# Patient Record
Sex: FEMALE | Race: BLACK OR AFRICAN AMERICAN | NOT HISPANIC OR LATINO | Employment: UNEMPLOYED | URBAN - METROPOLITAN AREA
[De-identification: names, ages, dates, MRNs, and addresses within clinical notes are randomized per-mention and may not be internally consistent; named-entity substitution may affect disease eponyms.]

---

## 2017-01-24 ENCOUNTER — OFFICE VISIT (OUTPATIENT)
Dept: ORTHOPEDICS | Age: 67
End: 2017-01-24
Attending: INTERNAL MEDICINE

## 2017-01-24 ENCOUNTER — IMAGING SERVICES (OUTPATIENT)
Dept: GENERAL RADIOLOGY | Age: 67
End: 2017-01-24
Attending: ORTHOPAEDIC SURGERY

## 2017-01-24 VITALS
BODY MASS INDEX: 38.41 KG/M2 | WEIGHT: 225 LBS | DIASTOLIC BLOOD PRESSURE: 53 MMHG | SYSTOLIC BLOOD PRESSURE: 122 MMHG | TEMPERATURE: 97.9 F | HEART RATE: 68 BPM | HEIGHT: 64 IN

## 2017-01-24 DIAGNOSIS — M25.561 ACUTE PAIN OF RIGHT KNEE: ICD-10-CM

## 2017-01-24 DIAGNOSIS — M17.0 PRIMARY OSTEOARTHRITIS OF BOTH KNEES: Primary | ICD-10-CM

## 2017-01-24 PROCEDURE — 73560 X-RAY EXAM OF KNEE 1 OR 2: CPT | Performed by: RADIOLOGY

## 2017-01-24 PROCEDURE — 99244 OFF/OP CNSLTJ NEW/EST MOD 40: CPT | Performed by: ORTHOPAEDIC SURGERY

## 2017-02-02 DIAGNOSIS — Z87.19 H/O ESOPHAGITIS: ICD-10-CM

## 2017-02-02 RX ORDER — PANTOPRAZOLE SODIUM 40 MG/1
TABLET, DELAYED RELEASE ORAL
Qty: 90 TABLET | Refills: 0 | Status: SHIPPED | OUTPATIENT
Start: 2017-02-02 | End: 2017-09-06 | Stop reason: SDUPTHER

## 2017-05-07 DIAGNOSIS — Z87.19 H/O ESOPHAGITIS: ICD-10-CM

## 2017-05-08 RX ORDER — PANTOPRAZOLE SODIUM 40 MG/1
TABLET, DELAYED RELEASE ORAL
Qty: 90 TABLET | Refills: 0 | OUTPATIENT
Start: 2017-05-08

## 2017-06-28 ENCOUNTER — LAB SERVICES (OUTPATIENT)
Dept: LAB | Age: 67
End: 2017-06-28

## 2017-06-28 ENCOUNTER — OFFICE VISIT (OUTPATIENT)
Dept: INTERNAL MEDICINE | Age: 67
End: 2017-06-28

## 2017-06-28 VITALS
WEIGHT: 228 LBS | RESPIRATION RATE: 16 BRPM | HEART RATE: 69 BPM | OXYGEN SATURATION: 97 % | TEMPERATURE: 98 F | DIASTOLIC BLOOD PRESSURE: 70 MMHG | HEIGHT: 64 IN | SYSTOLIC BLOOD PRESSURE: 114 MMHG | BODY MASS INDEX: 38.93 KG/M2

## 2017-06-28 DIAGNOSIS — Z13.228 SCREENING FOR ENDOCRINE/METABOLIC/IMMUNITY DISORDERS: ICD-10-CM

## 2017-06-28 DIAGNOSIS — Z13.29 SCREENING FOR ENDOCRINE/METABOLIC/IMMUNITY DISORDERS: ICD-10-CM

## 2017-06-28 DIAGNOSIS — Z13.0 SCREENING FOR ENDOCRINE/METABOLIC/IMMUNITY DISORDERS: ICD-10-CM

## 2017-06-28 DIAGNOSIS — H54.3 DECREASED VISION IN BOTH EYES: ICD-10-CM

## 2017-06-28 DIAGNOSIS — I95.2 HYPOTENSION DUE TO DRUGS: Primary | ICD-10-CM

## 2017-06-28 DIAGNOSIS — I95.2 HYPOTENSION DUE TO DRUGS: ICD-10-CM

## 2017-06-28 LAB
ALBUMIN SERPL-MCNC: 3.8 G/DL (ref 3.6–5.1)
ALBUMIN/GLOB SERPL: 1.1 {RATIO} (ref 1–2.4)
ALP SERPL-CCNC: 90 UNITS/L (ref 45–117)
ALT SERPL-CCNC: 19 UNITS/L
ANION GAP SERPL CALC-SCNC: 13 MMOL/L (ref 10–20)
AST SERPL-CCNC: 18 UNITS/L
BASOPHILS # BLD AUTO: 0 K/MCL (ref 0–0.3)
BASOPHILS NFR BLD AUTO: 0 %
BILIRUB SERPL-MCNC: 0.4 MG/DL (ref 0.2–1)
BUN SERPL-MCNC: 15 MG/DL (ref 6–20)
BUN/CREAT SERPL: 19 (ref 7–25)
CALCIUM SERPL-MCNC: 9.7 MG/DL (ref 8.4–10.2)
CHLORIDE SERPL-SCNC: 104 MMOL/L (ref 98–107)
CO2 SERPL-SCNC: 25 MMOL/L (ref 21–32)
CREAT SERPL-MCNC: 0.8 MG/DL (ref 0.51–0.95)
DIFFERENTIAL METHOD BLD: NORMAL
EOSINOPHIL # BLD AUTO: 0.3 K/MCL (ref 0.1–0.5)
EOSINOPHIL NFR SPEC: 3 %
ERYTHROCYTE [DISTWIDTH] IN BLOOD: 14 % (ref 11–15)
FASTING STATUS PATIENT QL REPORTED: NORMAL HRS
GLOBULIN SER-MCNC: 3.6 G/DL (ref 2–4)
GLUCOSE SERPL-MCNC: 88 MG/DL (ref 65–99)
GLUCOSE, EST AVERAGE: ABNORMAL
HBA1C MFR BLD: 6.1 %
HBA1C MFR BLD: 6.1 % (ref 4.5–5.6)
HCT VFR BLD CALC: 39.2 % (ref 36–46.5)
HGB BLD-MCNC: 12.8 G/DL (ref 12–15.5)
LYMPHOCYTES # BLD MANUAL: 2.2 K/MCL (ref 1–4)
LYMPHOCYTES NFR BLD MANUAL: 24 %
MCH RBC QN AUTO: 28.8 PG (ref 26–34)
MCHC RBC AUTO-ENTMCNC: 32.7 G/DL (ref 32–36.5)
MCV RBC AUTO: 88.1 FL (ref 78–100)
MONOCYTES # BLD MANUAL: 0.3 K/MCL (ref 0.3–0.9)
MONOCYTES NFR BLD MANUAL: 4 %
NEUTROPHILS # BLD AUTO: 6.4 K/MCL (ref 1.8–7.7)
NEUTROPHILS NFR BLD AUTO: 69 %
PLATELET # BLD: 286 K/MCL (ref 140–450)
POTASSIUM SERPL-SCNC: 4.4 MMOL/L (ref 3.4–5.1)
PROT SERPL-MCNC: 7.4 G/DL (ref 6.4–8.2)
RBC # BLD: 4.45 MIL/MCL (ref 4–5.2)
SODIUM SERPL-SCNC: 138 MMOL/L (ref 135–145)
WBC # BLD: 9.2 K/MCL (ref 4.2–11)

## 2017-06-28 PROCEDURE — 99213 OFFICE O/P EST LOW 20 MIN: CPT | Performed by: NURSE PRACTITIONER

## 2017-06-28 PROCEDURE — 36415 COLL VENOUS BLD VENIPUNCTURE: CPT | Performed by: NURSE PRACTITIONER

## 2017-06-28 PROCEDURE — 83036 HEMOGLOBIN GLYCOSYLATED A1C: CPT | Performed by: NURSE PRACTITIONER

## 2017-06-28 RX ORDER — AMLODIPINE BESYLATE 5 MG/1
5 TABLET ORAL DAILY
Qty: 30 TABLET | Refills: 1 | Status: SHIPPED | OUTPATIENT
Start: 2017-06-28 | End: 2017-06-28 | Stop reason: SDUPTHER

## 2017-06-28 RX ORDER — AMLODIPINE BESYLATE 5 MG/1
5 TABLET ORAL DAILY
Qty: 90 TABLET | Refills: 1 | Status: SHIPPED | OUTPATIENT
Start: 2017-06-28 | End: 2017-09-06 | Stop reason: SDUPTHER

## 2017-06-29 ENCOUNTER — TELEPHONE (OUTPATIENT)
Dept: INTERNAL MEDICINE | Age: 67
End: 2017-06-29

## 2017-07-06 ENCOUNTER — OFFICE VISIT (OUTPATIENT)
Dept: OPHTHALMOLOGY | Age: 67
End: 2017-07-06
Attending: NURSE PRACTITIONER

## 2017-07-06 DIAGNOSIS — H52.4 ASTIGMATISM WITH PRESBYOPIA, BILATERAL: ICD-10-CM

## 2017-07-06 DIAGNOSIS — H25.813 COMBINED FORMS OF AGE-RELATED CATARACT OF BOTH EYES: Primary | ICD-10-CM

## 2017-07-06 DIAGNOSIS — H52.203 ASTIGMATISM WITH PRESBYOPIA, BILATERAL: ICD-10-CM

## 2017-07-06 DIAGNOSIS — H53.9 VISION DISTURBANCE: ICD-10-CM

## 2017-07-06 PROCEDURE — 92015 DETERMINE REFRACTIVE STATE: CPT | Performed by: OPTOMETRIST

## 2017-07-06 PROCEDURE — 92014 COMPRE OPH EXAM EST PT 1/>: CPT | Performed by: OPTOMETRIST

## 2017-07-06 ASSESSMENT — REFRACTION_WEARINGRX
OD_SPHERE: +1.25
OD_ADD: +2.00
OD_CYLINDER: +1.25
OS_ADD: +2.00
OD_AXIS: 173
OS_SPHERE: +1.25
OS_AXIS: 176
OS_CYLINDER: +1.00

## 2017-07-06 ASSESSMENT — CUP TO DISC RATIO
OS_RATIO: 0.35
OD_RATIO: 0.35

## 2017-07-06 ASSESSMENT — VISUAL ACUITY
OS_CC: 20/40+1
OD_CC: 20/25+2
OS_PH_CC: 20/30-2
METHOD: SNELLEN - LINEAR

## 2017-07-06 ASSESSMENT — SLIT LAMP EXAM - LIDS
COMMENTS: NORMAL
COMMENTS: NORMAL

## 2017-07-06 ASSESSMENT — EXTERNAL EXAM - RIGHT EYE: OD_EXAM: NORMAL

## 2017-07-06 ASSESSMENT — REFRACTION_MANIFEST
OS_AXIS: 180
OD_SPHERE: +1.00
OS_CYLINDER: +0.75
OD_ADD: +2.25
OS_SPHERE: +1.00
OS_ADD: +2.25
OD_AXIS: 180
OD_CYLINDER: +0.75

## 2017-07-06 ASSESSMENT — REFRACTION
OD_AXIS: 177
OS_AXIS: 017
OD_ADD: +2.25
OD_CYLINDER: +1.25
OD_SPHERE: +0.75
OS_CYLINDER: +0.50
OS_SPHERE: +1.00
OS_ADD: +2.25

## 2017-07-06 ASSESSMENT — TONOMETRY
OS_IOP_MMHG: 18
IOP_METHOD: APPLANATION
OD_IOP_MMHG: 20

## 2017-07-06 ASSESSMENT — CONF VISUAL FIELD
OS_NORMAL: 1
OD_NORMAL: 1

## 2017-07-06 ASSESSMENT — GONIOSCOPY
OS_SUPERIOR: OPEN
OD_SUPERIOR: OPEN

## 2017-07-06 ASSESSMENT — EXTERNAL EXAM - LEFT EYE: OS_EXAM: NORMAL

## 2017-08-04 DIAGNOSIS — Z87.19 H/O ESOPHAGITIS: ICD-10-CM

## 2017-08-04 DIAGNOSIS — I95.2 HYPOTENSION DUE TO DRUGS: ICD-10-CM

## 2017-08-04 RX ORDER — AMLODIPINE BESYLATE 5 MG/1
5 TABLET ORAL DAILY
Qty: 90 TABLET | Refills: 1 | Status: SHIPPED | OUTPATIENT
Start: 2017-08-04 | End: 2017-09-06 | Stop reason: SDUPTHER

## 2017-08-04 RX ORDER — PANTOPRAZOLE SODIUM 40 MG/1
40 TABLET, DELAYED RELEASE ORAL DAILY
Qty: 90 TABLET | Refills: 1 | Status: SHIPPED | OUTPATIENT
Start: 2017-08-04 | End: 2017-09-06 | Stop reason: SDUPTHER

## 2017-09-06 ENCOUNTER — OFFICE VISIT (OUTPATIENT)
Dept: INTERNAL MEDICINE | Age: 67
End: 2017-09-06

## 2017-09-06 ENCOUNTER — HOSPITAL ENCOUNTER (OUTPATIENT)
Dept: MAMMOGRAPHY | Age: 67
Discharge: HOME | End: 2017-09-06
Attending: INTERNAL MEDICINE

## 2017-09-06 VITALS
HEART RATE: 72 BPM | SYSTOLIC BLOOD PRESSURE: 159 MMHG | OXYGEN SATURATION: 98 % | WEIGHT: 228 LBS | DIASTOLIC BLOOD PRESSURE: 69 MMHG | HEIGHT: 64 IN | TEMPERATURE: 96.8 F | BODY MASS INDEX: 38.93 KG/M2

## 2017-09-06 DIAGNOSIS — Z12.31 VISIT FOR SCREENING MAMMOGRAM: ICD-10-CM

## 2017-09-06 DIAGNOSIS — M17.0 OSTEOARTHRITIS OF BOTH KNEES, UNSPECIFIED OSTEOARTHRITIS TYPE: ICD-10-CM

## 2017-09-06 DIAGNOSIS — R73.09 ELEVATED HEMOGLOBIN A1C: ICD-10-CM

## 2017-09-06 DIAGNOSIS — K57.90 DIVERTICULOSIS OF INTESTINE WITHOUT BLEEDING, UNSPECIFIED INTESTINAL TRACT LOCATION: ICD-10-CM

## 2017-09-06 DIAGNOSIS — K22.2 GERD WITH STRICTURE: ICD-10-CM

## 2017-09-06 DIAGNOSIS — E66.01 MORBID OBESITY DUE TO EXCESS CALORIES (CMD): ICD-10-CM

## 2017-09-06 DIAGNOSIS — K44.9 HIATAL HERNIA: ICD-10-CM

## 2017-09-06 DIAGNOSIS — K21.9 GERD WITH STRICTURE: ICD-10-CM

## 2017-09-06 DIAGNOSIS — I10 ESSENTIAL HYPERTENSION: Primary | ICD-10-CM

## 2017-09-06 DIAGNOSIS — R60.0 BILATERAL LEG EDEMA: ICD-10-CM

## 2017-09-06 DIAGNOSIS — K22.10 EROSIVE ESOPHAGITIS: ICD-10-CM

## 2017-09-06 PROCEDURE — G0202 SCR MAMMO BI INCL CAD: HCPCS

## 2017-09-06 PROCEDURE — G0202 SCR MAMMO BI INCL CAD: HCPCS | Performed by: RADIOLOGY

## 2017-09-06 PROCEDURE — 99214 OFFICE O/P EST MOD 30 MIN: CPT | Performed by: INTERNAL MEDICINE

## 2017-09-06 PROCEDURE — 3342F MAMMO ASSESS BENGN DOCD: CPT | Performed by: RADIOLOGY

## 2017-09-06 RX ORDER — PANTOPRAZOLE SODIUM 40 MG/1
40 TABLET, DELAYED RELEASE ORAL DAILY
Qty: 90 TABLET | Refills: 0 | Status: SHIPPED | OUTPATIENT
Start: 2017-09-06 | End: 2017-12-13 | Stop reason: SDUPTHER

## 2017-09-06 RX ORDER — AMLODIPINE BESYLATE 5 MG/1
5 TABLET ORAL DAILY
Qty: 90 TABLET | Refills: 1 | Status: SHIPPED | OUTPATIENT
Start: 2017-09-06 | End: 2018-05-16 | Stop reason: ALTCHOICE

## 2017-09-06 ASSESSMENT — ENCOUNTER SYMPTOMS
SHORTNESS OF BREATH: 0
HEARTBURN: 1
BLURRED VISION: 1
HEADACHES: 1

## 2017-09-08 ENCOUNTER — TELEPHONE (OUTPATIENT)
Dept: INTERNAL MEDICINE | Age: 67
End: 2017-09-08

## 2017-10-04 ENCOUNTER — OFFICE VISIT (OUTPATIENT)
Dept: INTERNAL MEDICINE | Age: 67
End: 2017-10-04

## 2017-10-04 VITALS
BODY MASS INDEX: 38.76 KG/M2 | WEIGHT: 227 LBS | DIASTOLIC BLOOD PRESSURE: 70 MMHG | TEMPERATURE: 96.6 F | SYSTOLIC BLOOD PRESSURE: 131 MMHG | HEART RATE: 66 BPM | HEIGHT: 64 IN

## 2017-10-04 DIAGNOSIS — I10 ESSENTIAL HYPERTENSION: Primary | ICD-10-CM

## 2017-10-04 DIAGNOSIS — I89.0 LYMPHEDEMA OF BOTH LOWER EXTREMITIES: ICD-10-CM

## 2017-10-04 DIAGNOSIS — Z23 NEED FOR VACCINATION: ICD-10-CM

## 2017-10-04 DIAGNOSIS — Z23 NEEDS FLU SHOT: ICD-10-CM

## 2017-10-04 DIAGNOSIS — N18.2 CHRONIC KIDNEY DISEASE (CKD), STAGE II (MILD): ICD-10-CM

## 2017-10-04 PROCEDURE — 99213 OFFICE O/P EST LOW 20 MIN: CPT | Performed by: INTERNAL MEDICINE

## 2017-10-04 PROCEDURE — 90686 IIV4 VACC NO PRSV 0.5 ML IM: CPT | Performed by: INTERNAL MEDICINE

## 2017-10-04 PROCEDURE — G0008 ADMIN INFLUENZA VIRUS VAC: HCPCS | Performed by: INTERNAL MEDICINE

## 2017-10-04 ASSESSMENT — ENCOUNTER SYMPTOMS
SLEEP DISTURBANCE: 0
CONSTIPATION: 0
SHORTNESS OF BREATH: 0

## 2017-10-09 ENCOUNTER — APPOINTMENT (OUTPATIENT)
Dept: REHABILITATION | Age: 67
End: 2017-10-09
Attending: INTERNAL MEDICINE

## 2017-10-09 ENCOUNTER — OFFICE VISIT (OUTPATIENT)
Dept: GASTROENTEROLOGY | Age: 67
End: 2017-10-09

## 2017-10-09 VITALS
BODY MASS INDEX: 38.93 KG/M2 | SYSTOLIC BLOOD PRESSURE: 132 MMHG | HEIGHT: 64 IN | HEART RATE: 83 BPM | OXYGEN SATURATION: 99 % | TEMPERATURE: 97.5 F | WEIGHT: 228 LBS | DIASTOLIC BLOOD PRESSURE: 79 MMHG

## 2017-10-09 DIAGNOSIS — R13.10 DYSPHAGIA, UNSPECIFIED TYPE: Primary | ICD-10-CM

## 2017-10-09 PROCEDURE — 99203 OFFICE O/P NEW LOW 30 MIN: CPT | Performed by: INTERNAL MEDICINE

## 2017-10-16 ENCOUNTER — HOSPITAL ENCOUNTER (OUTPATIENT)
Dept: REHABILITATION | Age: 67
Discharge: STILL ADMITTED OR ADMINISTRATIVE | End: 2017-10-16
Attending: INTERNAL MEDICINE

## 2017-10-16 DIAGNOSIS — I89.0 LYMPHEDEMA OF BOTH LOWER EXTREMITIES: ICD-10-CM

## 2017-10-16 PROCEDURE — G8985 CARRY GOAL STATUS: HCPCS | Performed by: OCCUPATIONAL THERAPIST

## 2017-10-16 PROCEDURE — 10004138 HB COUNTER EVAL LYMPHEDEMA: Performed by: OCCUPATIONAL THERAPIST

## 2017-10-16 PROCEDURE — G8990 OTHER PT/OT CURRENT STATUS: HCPCS | Performed by: OCCUPATIONAL THERAPIST

## 2017-10-16 PROCEDURE — 97530 THERAPEUTIC ACTIVITIES: CPT | Performed by: OCCUPATIONAL THERAPIST

## 2017-10-16 PROCEDURE — 10004172 HB COUNTER-THERAPY VISIT OT: Performed by: OCCUPATIONAL THERAPIST

## 2017-10-16 PROCEDURE — 97165 OT EVAL LOW COMPLEX 30 MIN: CPT | Performed by: OCCUPATIONAL THERAPIST

## 2017-10-23 ENCOUNTER — PREP FOR CASE (OUTPATIENT)
Dept: GASTROENTEROLOGY | Age: 67
End: 2017-10-23

## 2017-10-23 DIAGNOSIS — R13.10 DYSPHAGIA, UNSPECIFIED TYPE: Primary | ICD-10-CM

## 2017-10-24 ENCOUNTER — HOSPITAL ENCOUNTER (OUTPATIENT)
Dept: REHABILITATION | Age: 67
Discharge: STILL ADMITTED OR ADMINISTRATIVE | End: 2017-10-24
Attending: INTERNAL MEDICINE

## 2017-10-24 PROCEDURE — 10004172 HB COUNTER-THERAPY VISIT OT: Performed by: OCCUPATIONAL THERAPY ASSISTANT

## 2017-10-24 PROCEDURE — 97140 MANUAL THERAPY 1/> REGIONS: CPT | Performed by: OCCUPATIONAL THERAPY ASSISTANT

## 2017-10-24 PROCEDURE — 97530 THERAPEUTIC ACTIVITIES: CPT | Performed by: OCCUPATIONAL THERAPY ASSISTANT

## 2017-10-30 ENCOUNTER — APPOINTMENT (OUTPATIENT)
Dept: REHABILITATION | Age: 67
End: 2017-10-30
Attending: INTERNAL MEDICINE

## 2017-11-01 RX ORDER — SODIUM CHLORIDE 9 MG/ML
INJECTION, SOLUTION INTRAVENOUS CONTINUOUS
Status: CANCELLED | OUTPATIENT
Start: 2017-11-01

## 2017-11-01 RX ORDER — 0.9 % SODIUM CHLORIDE 0.9 %
2 VIAL (ML) INJECTION PRN
Status: CANCELLED | OUTPATIENT
Start: 2017-11-01

## 2017-11-01 RX ORDER — 0.9 % SODIUM CHLORIDE 0.9 %
2 VIAL (ML) INJECTION EVERY 12 HOURS SCHEDULED
Status: CANCELLED | OUTPATIENT
Start: 2017-11-01

## 2017-11-01 ASSESSMENT — ACTIVITIES OF DAILY LIVING (ADL)
ADL_SHORT_OF_BREATH: NO
ADL_SCORE: 12
ADL_BEFORE_ADMISSION: INDEPENDENT
HISTORY OF FALLING IN THE LAST YEAR (PRIOR TO ADMISSION): NO
NEEDS_ASSIST: NO
CHRONIC_PAIN_PRESENT: NO
RECENT_DECLINE_ADL: NO
SENSORY_SUPPORT_DEVICES: EYEGLASSES

## 2017-11-01 ASSESSMENT — COGNITIVE AND FUNCTIONAL STATUS - GENERAL
ARE YOU BLIND OR DO YOU HAVE SERIOUS DIFFICULTY SEEING, EVEN WHEN WEARING GLASSES: NO
ARE YOU DEAF OR DO YOU HAVE SERIOUS DIFFICULTY  HEARING: NO

## 2017-11-02 ENCOUNTER — OFF PREMISE (OUTPATIENT)
Dept: GASTROENTEROLOGY | Age: 67
End: 2017-11-02

## 2017-11-02 ENCOUNTER — HOSPITAL ENCOUNTER (OUTPATIENT)
Age: 67
Discharge: HOME | End: 2017-11-02
Attending: INTERNAL MEDICINE | Admitting: INTERNAL MEDICINE

## 2017-11-02 ENCOUNTER — SURGERY (OUTPATIENT)
Age: 67
End: 2017-11-02

## 2017-11-02 VITALS
BODY MASS INDEX: 38.58 KG/M2 | SYSTOLIC BLOOD PRESSURE: 124 MMHG | OXYGEN SATURATION: 97 % | DIASTOLIC BLOOD PRESSURE: 64 MMHG | RESPIRATION RATE: 12 BRPM | WEIGHT: 225.97 LBS | HEART RATE: 74 BPM | HEIGHT: 64 IN | TEMPERATURE: 98.2 F

## 2017-11-02 DIAGNOSIS — K22.2 BENIGN ESOPHAGEAL STRICTURE: ICD-10-CM

## 2017-11-02 DIAGNOSIS — R13.10 DYSPHAGIA, UNSPECIFIED TYPE: ICD-10-CM

## 2017-11-02 PROCEDURE — 10004185 HB COUNTER-VISIT  CENSUS

## 2017-11-02 PROCEDURE — C1726 CATH, BAL DIL, NON-VASCULAR: HCPCS | Performed by: INTERNAL MEDICINE

## 2017-11-02 PROCEDURE — G0500 MOD SEDAT ENDO SERVICE >5YRS: HCPCS | Performed by: INTERNAL MEDICINE

## 2017-11-02 PROCEDURE — 43249 ESOPH EGD DILATION <30 MM: CPT | Performed by: INTERNAL MEDICINE

## 2017-11-02 PROCEDURE — 10002807 HB RX 258: Performed by: INTERNAL MEDICINE

## 2017-11-02 PROCEDURE — 10002800 HB RX 250 W HCPCS: Performed by: INTERNAL MEDICINE

## 2017-11-02 PROCEDURE — 10003436 HB UPPER GI ENDOSCOPY: Performed by: INTERNAL MEDICINE

## 2017-11-02 RX ORDER — SODIUM CHLORIDE 9 MG/ML
INJECTION, SOLUTION INTRAVENOUS CONTINUOUS
Status: DISCONTINUED | OUTPATIENT
Start: 2017-11-02 | End: 2017-11-02 | Stop reason: HOSPADM

## 2017-11-02 RX ORDER — 0.9 % SODIUM CHLORIDE 0.9 %
2 VIAL (ML) INJECTION PRN
Status: DISCONTINUED | OUTPATIENT
Start: 2017-11-02 | End: 2017-11-02 | Stop reason: HOSPADM

## 2017-11-02 RX ORDER — 0.9 % SODIUM CHLORIDE 0.9 %
2 VIAL (ML) INJECTION EVERY 12 HOURS SCHEDULED
Status: DISCONTINUED | OUTPATIENT
Start: 2017-11-02 | End: 2017-11-02 | Stop reason: HOSPADM

## 2017-11-02 RX ORDER — MIDAZOLAM HYDROCHLORIDE 1 MG/ML
INJECTION, SOLUTION INTRAMUSCULAR; INTRAVENOUS PRN
Status: DISCONTINUED | OUTPATIENT
Start: 2017-11-02 | End: 2017-11-02 | Stop reason: HOSPADM

## 2017-11-02 RX ADMIN — SODIUM CHLORIDE: 9 INJECTION, SOLUTION INTRAVENOUS at 12:08

## 2017-11-02 RX ADMIN — MIDAZOLAM HYDROCHLORIDE 2 MG: 1 INJECTION, SOLUTION INTRAMUSCULAR; INTRAVENOUS at 12:24

## 2017-11-02 RX ADMIN — MIDAZOLAM HYDROCHLORIDE 2 MG: 1 INJECTION, SOLUTION INTRAMUSCULAR; INTRAVENOUS at 12:28

## 2017-11-02 RX ADMIN — FENTANYL CITRATE 50 MCG: 50 INJECTION INTRAMUSCULAR; INTRAVENOUS at 12:26

## 2017-11-02 RX ADMIN — FENTANYL CITRATE 50 MCG: 50 INJECTION INTRAMUSCULAR; INTRAVENOUS at 12:30

## 2017-11-02 ASSESSMENT — LIFESTYLE VARIABLES: SMOKING_HISTORY: NO

## 2017-11-02 ASSESSMENT — ACTIVITIES OF DAILY LIVING (ADL): HISTORY OF FALLING IN THE LAST YEAR (PRIOR TO ADMISSION): NO

## 2017-11-10 ENCOUNTER — TELEPHONE (OUTPATIENT)
Dept: GASTROENTEROLOGY | Age: 67
End: 2017-11-10

## 2017-12-13 DIAGNOSIS — K22.10 EROSIVE ESOPHAGITIS: ICD-10-CM

## 2017-12-13 DIAGNOSIS — K21.9 GERD WITH STRICTURE: ICD-10-CM

## 2017-12-13 DIAGNOSIS — K22.2 GERD WITH STRICTURE: ICD-10-CM

## 2017-12-13 RX ORDER — PANTOPRAZOLE SODIUM 40 MG/1
40 TABLET, DELAYED RELEASE ORAL DAILY
Qty: 90 TABLET | Refills: 1 | Status: SHIPPED | OUTPATIENT
Start: 2017-12-13 | End: 2018-07-27 | Stop reason: SDUPTHER

## 2017-12-18 ENCOUNTER — OFFICE VISIT (OUTPATIENT)
Dept: GASTROENTEROLOGY | Age: 67
End: 2017-12-18

## 2017-12-18 VITALS
HEART RATE: 88 BPM | BODY MASS INDEX: 38.58 KG/M2 | TEMPERATURE: 97.5 F | DIASTOLIC BLOOD PRESSURE: 74 MMHG | WEIGHT: 226 LBS | SYSTOLIC BLOOD PRESSURE: 157 MMHG | OXYGEN SATURATION: 99 % | HEIGHT: 64 IN

## 2017-12-18 DIAGNOSIS — K22.2 BENIGN ESOPHAGEAL STRICTURE: Primary | ICD-10-CM

## 2017-12-18 PROCEDURE — 99213 OFFICE O/P EST LOW 20 MIN: CPT | Performed by: INTERNAL MEDICINE

## 2017-12-27 ENCOUNTER — LAB SERVICES (OUTPATIENT)
Dept: LAB | Age: 67
End: 2017-12-27

## 2017-12-27 ENCOUNTER — OFFICE VISIT (OUTPATIENT)
Dept: INTERNAL MEDICINE | Age: 67
End: 2017-12-27

## 2017-12-27 VITALS
BODY MASS INDEX: 40.92 KG/M2 | HEART RATE: 80 BPM | DIASTOLIC BLOOD PRESSURE: 77 MMHG | TEMPERATURE: 96.1 F | HEIGHT: 64 IN | SYSTOLIC BLOOD PRESSURE: 136 MMHG | WEIGHT: 239.7 LBS

## 2017-12-27 DIAGNOSIS — K22.2 GERD WITH STRICTURE: ICD-10-CM

## 2017-12-27 DIAGNOSIS — M79.605 BILATERAL LEG PAIN: ICD-10-CM

## 2017-12-27 DIAGNOSIS — Z00.00 MEDICARE ANNUAL WELLNESS VISIT, SUBSEQUENT: ICD-10-CM

## 2017-12-27 DIAGNOSIS — I10 ESSENTIAL HYPERTENSION: ICD-10-CM

## 2017-12-27 DIAGNOSIS — I89.0 LYMPHEDEMA OF BOTH LOWER EXTREMITIES: Primary | ICD-10-CM

## 2017-12-27 DIAGNOSIS — N18.2 CKD (CHRONIC KIDNEY DISEASE) STAGE 2, GFR 60-89 ML/MIN: ICD-10-CM

## 2017-12-27 DIAGNOSIS — E66.01 MORBID OBESITY (CMD): ICD-10-CM

## 2017-12-27 DIAGNOSIS — K21.9 GERD WITH STRICTURE: ICD-10-CM

## 2017-12-27 DIAGNOSIS — M79.604 BILATERAL LEG PAIN: ICD-10-CM

## 2017-12-27 PROCEDURE — G0439 PPPS, SUBSEQ VISIT: HCPCS | Performed by: INTERNAL MEDICINE

## 2017-12-27 PROCEDURE — 99213 OFFICE O/P EST LOW 20 MIN: CPT | Performed by: INTERNAL MEDICINE

## 2017-12-27 PROCEDURE — 36415 COLL VENOUS BLD VENIPUNCTURE: CPT | Performed by: INTERNAL MEDICINE

## 2017-12-27 ASSESSMENT — PATIENT HEALTH QUESTIONNAIRE - PHQ9
CLINICAL INTERPRETATION OF PHQ2 SCORE: 0
SUM OF ALL RESPONSES TO PHQ9 QUESTIONS 1 AND 2: 0
1. LITTLE INTEREST OR PLEASURE IN DOING THINGS: NO
2. FEELING DOWN, DEPRESSED OR HOPELESS: NO

## 2017-12-27 ASSESSMENT — ENCOUNTER SYMPTOMS: SHORTNESS OF BREATH: 0

## 2017-12-28 LAB
ANION GAP SERPL CALC-SCNC: 13 MMOL/L (ref 10–20)
BUN SERPL-MCNC: 9 MG/DL (ref 6–20)
BUN/CREAT SERPL: 10 (ref 7–25)
CALCIUM SERPL-MCNC: 9.5 MG/DL (ref 8.4–10.2)
CHLORIDE SERPL-SCNC: 105 MMOL/L (ref 98–107)
CO2 SERPL-SCNC: 28 MMOL/L (ref 21–32)
CREAT ?TM UR-MCNC: 168 MG/DL
CREAT SERPL-MCNC: 0.88 MG/DL (ref 0.51–0.95)
FASTING STATUS PATIENT QL REPORTED: NORMAL HRS
GLUCOSE SERPL-MCNC: 92 MG/DL (ref 65–99)
POTASSIUM SERPL-SCNC: 4.9 MMOL/L (ref 3.4–5.1)
PROT UR-MCNC: 23 MG/DL
PROT/CREAT UR: 137 MGPR/GCR
SODIUM SERPL-SCNC: 141 MMOL/L (ref 135–145)

## 2017-12-29 ENCOUNTER — TELEPHONE (OUTPATIENT)
Dept: INTERNAL MEDICINE | Age: 67
End: 2017-12-29

## 2018-01-03 DIAGNOSIS — Z86.39 HISTORY OF VITAMIN D DEFICIENCY: ICD-10-CM

## 2018-01-04 RX ORDER — ERGOCALCIFEROL 1.25 MG/1
CAPSULE ORAL
Qty: 12 CAPSULE | Refills: 0 | Status: SHIPPED | OUTPATIENT
Start: 2018-01-04 | End: 2018-04-21 | Stop reason: SDUPTHER

## 2018-01-09 ENCOUNTER — APPOINTMENT (OUTPATIENT)
Dept: REHABILITATION | Age: 68
End: 2018-01-09
Attending: INTERNAL MEDICINE

## 2018-01-23 ENCOUNTER — TELEPHONE (OUTPATIENT)
Dept: INTERNAL MEDICINE | Age: 68
End: 2018-01-23

## 2018-01-23 DIAGNOSIS — I89.0 LYMPHEDEMA OF BOTH LOWER EXTREMITIES: ICD-10-CM

## 2018-01-23 DIAGNOSIS — E66.01 MORBID OBESITY (CMD): Primary | ICD-10-CM

## 2018-02-14 ENCOUNTER — OFFICE VISIT (OUTPATIENT)
Dept: INTERNAL MEDICINE | Age: 68
End: 2018-02-14

## 2018-02-14 VITALS
HEART RATE: 82 BPM | BODY MASS INDEX: 38.99 KG/M2 | DIASTOLIC BLOOD PRESSURE: 74 MMHG | WEIGHT: 228.4 LBS | OXYGEN SATURATION: 100 % | SYSTOLIC BLOOD PRESSURE: 134 MMHG | HEIGHT: 64 IN

## 2018-02-14 DIAGNOSIS — M17.0 PRIMARY OSTEOARTHRITIS OF BOTH KNEES: ICD-10-CM

## 2018-02-14 DIAGNOSIS — Z78.9 POOR TOLERANCE FOR AMBULATION: ICD-10-CM

## 2018-02-14 DIAGNOSIS — I10 ESSENTIAL HYPERTENSION: Primary | ICD-10-CM

## 2018-02-14 PROCEDURE — 99213 OFFICE O/P EST LOW 20 MIN: CPT | Performed by: INTERNAL MEDICINE

## 2018-02-14 ASSESSMENT — ENCOUNTER SYMPTOMS: SHORTNESS OF BREATH: 0

## 2018-02-15 ENCOUNTER — TELEPHONE (OUTPATIENT)
Dept: FAMILY MEDICINE | Age: 68
End: 2018-02-15

## 2018-02-20 ENCOUNTER — TELEPHONE (OUTPATIENT)
Dept: FAMILY MEDICINE | Age: 68
End: 2018-02-20

## 2018-04-21 DIAGNOSIS — Z86.39 HISTORY OF VITAMIN D DEFICIENCY: ICD-10-CM

## 2018-04-23 RX ORDER — ERGOCALCIFEROL 1.25 MG/1
CAPSULE ORAL
Qty: 12 CAPSULE | Refills: 2 | Status: SHIPPED | OUTPATIENT
Start: 2018-04-23 | End: 2018-07-27 | Stop reason: SDUPTHER

## 2018-05-16 ENCOUNTER — OFFICE VISIT (OUTPATIENT)
Dept: INTERNAL MEDICINE | Age: 68
End: 2018-05-16

## 2018-05-16 VITALS
WEIGHT: 231 LBS | HEART RATE: 76 BPM | BODY MASS INDEX: 39.44 KG/M2 | TEMPERATURE: 96.6 F | SYSTOLIC BLOOD PRESSURE: 118 MMHG | OXYGEN SATURATION: 97 % | HEIGHT: 64 IN | DIASTOLIC BLOOD PRESSURE: 64 MMHG

## 2018-05-16 DIAGNOSIS — K22.2 GERD WITH STRICTURE: ICD-10-CM

## 2018-05-16 DIAGNOSIS — I10 ESSENTIAL HYPERTENSION: ICD-10-CM

## 2018-05-16 DIAGNOSIS — K22.10 EROSIVE ESOPHAGITIS: ICD-10-CM

## 2018-05-16 DIAGNOSIS — K21.9 GERD WITH STRICTURE: ICD-10-CM

## 2018-05-16 DIAGNOSIS — E66.01 MORBID OBESITY (CMD): ICD-10-CM

## 2018-05-16 DIAGNOSIS — R73.09 ELEVATED HEMOGLOBIN A1C: ICD-10-CM

## 2018-05-16 DIAGNOSIS — N18.2 CKD (CHRONIC KIDNEY DISEASE) STAGE 2, GFR 60-89 ML/MIN: ICD-10-CM

## 2018-05-16 DIAGNOSIS — I10 ESSENTIAL HYPERTENSION: Primary | ICD-10-CM

## 2018-05-16 DIAGNOSIS — I89.0 LYMPHEDEMA OF BOTH LOWER EXTREMITIES: ICD-10-CM

## 2018-05-16 DIAGNOSIS — E55.9 VITAMIN D DEFICIENCY: ICD-10-CM

## 2018-05-16 LAB
CREAT UR-MCNC: 342 MG/DL
MICROALBUMIN UR-MCNC: 15.3 MG/DL
MICROALBUMIN/CREAT UR: 44.7 MG/G

## 2018-05-16 PROCEDURE — 99213 OFFICE O/P EST LOW 20 MIN: CPT | Performed by: INTERNAL MEDICINE

## 2018-05-16 RX ORDER — CHLORTHALIDONE 25 MG/1
25 TABLET ORAL DAILY
Qty: 30 TABLET | Refills: 3 | Status: SHIPPED | OUTPATIENT
Start: 2018-05-16 | End: 2018-05-16 | Stop reason: SDUPTHER

## 2018-05-16 ASSESSMENT — ENCOUNTER SYMPTOMS
SHORTNESS OF BREATH: 0
CONSTIPATION: 0
ABDOMINAL PAIN: 0

## 2018-05-17 LAB
CREAT ?TM UR-MCNC: 349 MG/DL
PROT UR-MCNC: 55 MG/DL
PROT/CREAT UR: 158 MGPR/GCR

## 2018-05-18 ENCOUNTER — TELEPHONE (OUTPATIENT)
Dept: INTERNAL MEDICINE | Age: 68
End: 2018-05-18

## 2018-05-18 DIAGNOSIS — I10 ESSENTIAL HYPERTENSION: ICD-10-CM

## 2018-05-18 DIAGNOSIS — N18.2 CKD (CHRONIC KIDNEY DISEASE) STAGE 2, GFR 60-89 ML/MIN: Primary | ICD-10-CM

## 2018-05-18 RX ORDER — CHLORTHALIDONE 25 MG/1
25 TABLET ORAL DAILY
Qty: 90 TABLET | Refills: 0 | Status: SHIPPED | OUTPATIENT
Start: 2018-05-18 | End: 2018-05-18 | Stop reason: ALTCHOICE

## 2018-05-18 RX ORDER — LOSARTAN POTASSIUM 25 MG/1
25 TABLET ORAL DAILY
Qty: 90 TABLET | Refills: 1 | Status: SHIPPED | OUTPATIENT
Start: 2018-05-18 | End: 2018-07-27 | Stop reason: SDUPTHER

## 2018-05-23 ENCOUNTER — NURSE ONLY (OUTPATIENT)
Dept: INTERNAL MEDICINE | Age: 68
End: 2018-05-23

## 2018-05-23 ENCOUNTER — LAB SERVICES (OUTPATIENT)
Dept: LAB | Age: 68
End: 2018-05-23

## 2018-05-23 VITALS — SYSTOLIC BLOOD PRESSURE: 116 MMHG | DIASTOLIC BLOOD PRESSURE: 72 MMHG

## 2018-05-23 DIAGNOSIS — I10 ESSENTIAL HYPERTENSION: ICD-10-CM

## 2018-05-23 DIAGNOSIS — E55.9 VITAMIN D DEFICIENCY: ICD-10-CM

## 2018-05-23 DIAGNOSIS — R73.09 ELEVATED HEMOGLOBIN A1C: ICD-10-CM

## 2018-05-23 LAB
25(OH)D3+25(OH)D2 SERPL-MCNC: 51.9 NG/ML (ref 30–100)
ANION GAP SERPL CALC-SCNC: 11 MMOL/L (ref 10–20)
BUN SERPL-MCNC: 13 MG/DL (ref 6–20)
BUN/CREAT SERPL: 13 (ref 7–25)
CALCIUM SERPL-MCNC: 9.3 MG/DL (ref 8.4–10.2)
CHLORIDE SERPL-SCNC: 105 MMOL/L (ref 98–107)
CHOLEST SERPL-MCNC: 185 MG/DL
CHOLEST/HDLC SERPL: 3.6 {RATIO}
CO2 SERPL-SCNC: 28 MMOL/L (ref 21–32)
CREAT SERPL-MCNC: 1.03 MG/DL (ref 0.51–0.95)
FASTING STATUS PATIENT QL REPORTED: ABNORMAL HRS
GLUCOSE SERPL-MCNC: 83 MG/DL (ref 65–99)
HBA1C MFR BLD: 5.3 % (ref 4.5–5.6)
HDLC SERPL-MCNC: 52 MG/DL
LDLC SERPL-MCNC: 116 MG/DL
LENGTH OF FAST TIME PATIENT: NORMAL HRS
NONHDLC SERPL-MCNC: 133 MG/DL
POTASSIUM SERPL-SCNC: 4.4 MMOL/L (ref 3.4–5.1)
SODIUM SERPL-SCNC: 140 MMOL/L (ref 135–145)
TRIGL SERPL-MCNC: 83 MG/DL
TSH SERPL-ACNC: 4.12 MCUNITS/ML (ref 0.35–5)

## 2018-05-23 PROCEDURE — 36415 COLL VENOUS BLD VENIPUNCTURE: CPT | Performed by: INTERNAL MEDICINE

## 2018-05-24 ENCOUNTER — TELEPHONE (OUTPATIENT)
Dept: INTERNAL MEDICINE | Age: 68
End: 2018-05-24

## 2018-07-27 ENCOUNTER — TELEPHONE (OUTPATIENT)
Dept: INTERNAL MEDICINE | Age: 68
End: 2018-07-27

## 2018-07-27 DIAGNOSIS — K22.2 GERD WITH STRICTURE: ICD-10-CM

## 2018-07-27 DIAGNOSIS — Z86.39 HISTORY OF VITAMIN D DEFICIENCY: ICD-10-CM

## 2018-07-27 DIAGNOSIS — K22.10 EROSIVE ESOPHAGITIS: ICD-10-CM

## 2018-07-27 DIAGNOSIS — N18.2 CKD (CHRONIC KIDNEY DISEASE) STAGE 2, GFR 60-89 ML/MIN: ICD-10-CM

## 2018-07-27 DIAGNOSIS — I10 ESSENTIAL HYPERTENSION: ICD-10-CM

## 2018-07-27 DIAGNOSIS — K21.9 GERD WITH STRICTURE: ICD-10-CM

## 2018-07-27 RX ORDER — PANTOPRAZOLE SODIUM 40 MG/1
40 TABLET, DELAYED RELEASE ORAL DAILY
Qty: 90 TABLET | Refills: 1 | Status: SHIPPED | OUTPATIENT
Start: 2018-07-27 | End: 2019-04-14 | Stop reason: SDUPTHER

## 2018-07-27 RX ORDER — ERGOCALCIFEROL 1.25 MG/1
1 CAPSULE ORAL
Qty: 12 CAPSULE | Refills: 2 | Status: SHIPPED | OUTPATIENT
Start: 2018-07-30 | End: 2019-07-24 | Stop reason: SDUPTHER

## 2018-07-27 RX ORDER — LOSARTAN POTASSIUM 25 MG/1
25 TABLET ORAL DAILY
Qty: 90 TABLET | Refills: 1 | Status: SHIPPED | OUTPATIENT
Start: 2018-07-27 | End: 2019-07-31 | Stop reason: SDUPTHER

## 2018-09-13 ENCOUNTER — TELEPHONE (OUTPATIENT)
Dept: INTERNAL MEDICINE | Age: 68
End: 2018-09-13

## 2018-09-14 ENCOUNTER — OFF PREMISE CHARGES (OUTPATIENT)
Dept: GERIATRIC MEDICINE | Age: 68
End: 2018-09-14

## 2018-09-14 DIAGNOSIS — M89.9 DISORDER OF BONE AND CARTILAGE: ICD-10-CM

## 2018-09-14 DIAGNOSIS — M17.0 OSTEOARTHRITIS OF BOTH KNEES, UNSPECIFIED OSTEOARTHRITIS TYPE: ICD-10-CM

## 2018-09-14 DIAGNOSIS — M17.0 PRIMARY OSTEOARTHRITIS OF BOTH KNEES: Primary | ICD-10-CM

## 2018-09-14 DIAGNOSIS — I89.0 LYMPHEDEMA OF BOTH LOWER EXTREMITIES: ICD-10-CM

## 2018-09-14 DIAGNOSIS — M94.9 DISORDER OF BONE AND CARTILAGE: ICD-10-CM

## 2018-09-14 DIAGNOSIS — H25.813 COMBINED FORMS OF AGE-RELATED CATARACT OF BOTH EYES: ICD-10-CM

## 2018-09-14 DIAGNOSIS — H40.053 BILATERAL OCULAR HYPERTENSION: ICD-10-CM

## 2018-09-14 DIAGNOSIS — E66.01 MORBID OBESITY (CMD): ICD-10-CM

## 2018-09-28 ENCOUNTER — HOSPITAL ENCOUNTER (OUTPATIENT)
Dept: MAMMOGRAPHY | Age: 68
Discharge: HOME | End: 2018-09-28
Attending: INTERNAL MEDICINE

## 2018-09-28 DIAGNOSIS — Z12.39 SCREENING FOR BREAST CANCER: ICD-10-CM

## 2018-09-28 PROCEDURE — 77067 SCR MAMMO BI INCL CAD: CPT | Performed by: RADIOLOGY

## 2018-09-28 PROCEDURE — 77063 BREAST TOMOSYNTHESIS BI: CPT | Performed by: RADIOLOGY

## 2018-09-28 PROCEDURE — 77063 BREAST TOMOSYNTHESIS BI: CPT

## 2018-09-28 PROCEDURE — 3340F MAMMO ASSESS INC XRAY DOCD: CPT | Performed by: RADIOLOGY

## 2018-10-01 DIAGNOSIS — R92.8 ABNORMAL MAMMOGRAM: Primary | ICD-10-CM

## 2018-10-05 ENCOUNTER — HOSPITAL ENCOUNTER (OUTPATIENT)
Dept: ULTRASOUND IMAGING | Age: 68
Discharge: HOME | End: 2018-10-05
Attending: INTERNAL MEDICINE

## 2018-10-05 DIAGNOSIS — R92.8 ABNORMAL MAMMOGRAM: ICD-10-CM

## 2018-10-05 PROCEDURE — 76642 ULTRASOUND BREAST LIMITED: CPT | Performed by: RADIOLOGY

## 2018-10-05 PROCEDURE — 76642 ULTRASOUND BREAST LIMITED: CPT

## 2018-10-08 ENCOUNTER — TELEPHONE (OUTPATIENT)
Dept: INTERNAL MEDICINE | Age: 68
End: 2018-10-08

## 2018-10-08 DIAGNOSIS — N60.02 BREAST CYST, LEFT: Primary | ICD-10-CM

## 2018-10-19 DIAGNOSIS — N60.02 CYST OF LEFT BREAST: ICD-10-CM

## 2018-10-19 DIAGNOSIS — Z09 FOLLOW-UP EXAM, 3-6 MONTHS SINCE PREVIOUS EXAM: Primary | ICD-10-CM

## 2018-12-01 DIAGNOSIS — I10 ESSENTIAL HYPERTENSION: ICD-10-CM

## 2018-12-03 RX ORDER — AMLODIPINE BESYLATE 5 MG/1
5 TABLET ORAL DAILY
Qty: 90 TABLET | Refills: 1 | OUTPATIENT
Start: 2018-12-03

## 2018-12-11 DIAGNOSIS — N18.2 CKD (CHRONIC KIDNEY DISEASE) STAGE 2, GFR 60-89 ML/MIN: ICD-10-CM

## 2018-12-11 DIAGNOSIS — I10 ESSENTIAL HYPERTENSION: ICD-10-CM

## 2018-12-11 RX ORDER — LOSARTAN POTASSIUM 25 MG/1
25 TABLET ORAL DAILY
Qty: 90 TABLET | Refills: 0 | Status: SHIPPED | OUTPATIENT
Start: 2018-12-11 | End: 2019-07-24 | Stop reason: SDUPTHER

## 2018-12-21 DIAGNOSIS — I95.2 HYPOTENSION DUE TO DRUGS: ICD-10-CM

## 2018-12-21 RX ORDER — AMLODIPINE BESYLATE 5 MG/1
5 TABLET ORAL DAILY
Qty: 90 TABLET | Refills: 0 | OUTPATIENT
Start: 2018-12-21

## 2019-01-14 ENCOUNTER — LAB SERVICES (OUTPATIENT)
Dept: LAB | Age: 69
End: 2019-01-14

## 2019-01-14 ENCOUNTER — OFFICE VISIT (OUTPATIENT)
Dept: INTERNAL MEDICINE | Age: 69
End: 2019-01-14

## 2019-01-14 DIAGNOSIS — E66.01 MORBID OBESITY (CMD): ICD-10-CM

## 2019-01-14 DIAGNOSIS — R73.9 HYPERGLYCEMIA: ICD-10-CM

## 2019-01-14 DIAGNOSIS — M25.549 PAIN IN MULTIPLE FINGER JOINTS: Primary | ICD-10-CM

## 2019-01-14 DIAGNOSIS — D58.2 ELEVATED HEMOGLOBIN (CMD): ICD-10-CM

## 2019-01-14 DIAGNOSIS — M17.0 PRIMARY OSTEOARTHRITIS OF BOTH KNEES: ICD-10-CM

## 2019-01-14 DIAGNOSIS — M25.549 PAIN IN MULTIPLE FINGER JOINTS: ICD-10-CM

## 2019-01-14 PROCEDURE — 99214 OFFICE O/P EST MOD 30 MIN: CPT | Performed by: NURSE PRACTITIONER

## 2019-01-14 PROCEDURE — 36415 COLL VENOUS BLD VENIPUNCTURE: CPT | Performed by: NURSE PRACTITIONER

## 2019-01-14 RX ORDER — PREDNISONE 20 MG/1
40 TABLET ORAL DAILY
Qty: 10 TABLET | Refills: 0 | Status: SHIPPED | OUTPATIENT
Start: 2019-01-14 | End: 2019-01-19

## 2019-01-14 RX ORDER — LIDOCAINE 40 MG/G
CREAM TOPICAL PRN
Qty: 30 G | Refills: 0 | Status: SHIPPED | OUTPATIENT
Start: 2019-01-14 | End: 2019-07-24 | Stop reason: SDUPTHER

## 2019-01-14 RX ORDER — IBUPROFEN 200 MG
400 TABLET ORAL EVERY 8 HOURS PRN
Qty: 30 TABLET | Refills: 0 | Status: SHIPPED | OUTPATIENT
Start: 2019-01-14 | End: 2021-01-06 | Stop reason: SDUPTHER

## 2019-01-14 ASSESSMENT — PAIN SCALES - GENERAL: PAINLEVEL: 9-10

## 2019-01-15 LAB
ALBUMIN SERPL-MCNC: 3.7 G/DL (ref 3.6–5.1)
ALBUMIN/GLOB SERPL: 1 {RATIO} (ref 1–2.4)
ALP SERPL-CCNC: 90 UNITS/L (ref 45–117)
ALT SERPL-CCNC: 16 UNITS/L
ANA SER QL IA: NEGATIVE
ANION GAP SERPL CALC-SCNC: 8 MMOL/L (ref 10–20)
AST SERPL-CCNC: 11 UNITS/L
BASOPHILS # BLD AUTO: 0.1 K/MCL (ref 0–0.3)
BASOPHILS NFR BLD AUTO: 1 %
BILIRUB SERPL-MCNC: 0.4 MG/DL (ref 0.2–1)
BUN SERPL-MCNC: 10 MG/DL (ref 6–20)
BUN/CREAT SERPL: 11 (ref 7–25)
CALCIUM SERPL-MCNC: 8.9 MG/DL (ref 8.4–10.2)
CHLORIDE SERPL-SCNC: 108 MMOL/L (ref 98–107)
CO2 SERPL-SCNC: 28 MMOL/L (ref 21–32)
CREAT SERPL-MCNC: 0.91 MG/DL (ref 0.51–0.95)
DIFFERENTIAL METHOD BLD: ABNORMAL
EOSINOPHIL # BLD AUTO: 0.3 K/MCL (ref 0.1–0.5)
EOSINOPHIL NFR SPEC: 3 %
ERYTHROCYTE [DISTWIDTH] IN BLOOD: 14.2 % (ref 11–15)
ERYTHROCYTE [SEDIMENTATION RATE] IN BLOOD: 45 MM/HR (ref 0–20)
FASTING STATUS PATIENT QL REPORTED: ABNORMAL HRS
GLOBULIN SER-MCNC: 3.7 G/DL (ref 2–4)
GLUCOSE SERPL-MCNC: 85 MG/DL (ref 65–99)
HCT VFR BLD CALC: 39.5 % (ref 36–46.5)
HGB BLD-MCNC: 12.2 G/DL (ref 12–15.5)
IMM GRANULOCYTES # BLD AUTO: 0 K/MCL (ref 0–0.2)
IMM GRANULOCYTES NFR BLD: 0 %
LYMPHOCYTES # BLD MANUAL: 1.9 K/MCL (ref 1–4)
LYMPHOCYTES NFR BLD MANUAL: 23 %
MCH RBC QN AUTO: 28.1 PG (ref 26–34)
MCHC RBC AUTO-ENTMCNC: 30.9 G/DL (ref 32–36.5)
MCV RBC AUTO: 91 FL (ref 78–100)
MONOCYTES # BLD MANUAL: 0.3 K/MCL (ref 0.3–0.9)
MONOCYTES NFR BLD MANUAL: 4 %
NEUTROPHILS # BLD: 5.9 K/MCL (ref 1.8–7.7)
NEUTROPHILS NFR BLD AUTO: 69 %
NRBC BLD MANUAL-RTO: 0 /100 WBC
PLATELET # BLD: 297 K/MCL (ref 140–450)
POTASSIUM SERPL-SCNC: 4.3 MMOL/L (ref 3.4–5.1)
PROT SERPL-MCNC: 7.4 G/DL (ref 6.4–8.2)
RBC # BLD: 4.34 MIL/MCL (ref 4–5.2)
SODIUM SERPL-SCNC: 140 MMOL/L (ref 135–145)
URATE SERPL-MCNC: 5.7 MG/DL (ref 2.6–5.9)
WBC # BLD: 8.4 K/MCL (ref 4.2–11)

## 2019-01-16 ENCOUNTER — TELEPHONE (OUTPATIENT)
Dept: INTERNAL MEDICINE | Age: 69
End: 2019-01-16

## 2019-04-09 ENCOUNTER — HOSPITAL ENCOUNTER (OUTPATIENT)
Dept: ULTRASOUND IMAGING | Age: 69
Discharge: HOME | End: 2019-04-09
Attending: INTERNAL MEDICINE

## 2019-04-09 ENCOUNTER — HOSPITAL ENCOUNTER (OUTPATIENT)
Dept: MAMMOGRAPHY | Age: 69
Discharge: HOME | End: 2019-04-09
Attending: INTERNAL MEDICINE

## 2019-04-09 DIAGNOSIS — N60.02 BREAST CYST, LEFT: ICD-10-CM

## 2019-04-09 PROCEDURE — 76641 ULTRASOUND BREAST COMPLETE: CPT

## 2019-04-09 PROCEDURE — G0279 TOMOSYNTHESIS, MAMMO: HCPCS | Performed by: RADIOLOGY

## 2019-04-09 PROCEDURE — 77065 DX MAMMO INCL CAD UNI: CPT

## 2019-04-09 PROCEDURE — 77065 DX MAMMO INCL CAD UNI: CPT | Performed by: RADIOLOGY

## 2019-04-09 PROCEDURE — 76641 ULTRASOUND BREAST COMPLETE: CPT | Performed by: RADIOLOGY

## 2019-04-09 PROCEDURE — 76642 ULTRASOUND BREAST LIMITED: CPT

## 2019-04-10 ENCOUNTER — TELEPHONE (OUTPATIENT)
Dept: INTERNAL MEDICINE | Age: 69
End: 2019-04-10

## 2019-04-10 DIAGNOSIS — Z12.39 SCREENING FOR BREAST CANCER: ICD-10-CM

## 2019-04-10 DIAGNOSIS — N60.02 BREAST CYST, LEFT: Primary | ICD-10-CM

## 2019-04-14 DIAGNOSIS — K22.2 GERD WITH STRICTURE: ICD-10-CM

## 2019-04-14 DIAGNOSIS — K22.10 EROSIVE ESOPHAGITIS: ICD-10-CM

## 2019-04-14 DIAGNOSIS — K21.9 GERD WITH STRICTURE: ICD-10-CM

## 2019-04-16 RX ORDER — PANTOPRAZOLE SODIUM 40 MG/1
40 TABLET, DELAYED RELEASE ORAL DAILY
Qty: 90 TABLET | Refills: 1 | Status: SHIPPED | OUTPATIENT
Start: 2019-04-16 | End: 2019-10-21 | Stop reason: SDUPTHER

## 2019-07-24 ENCOUNTER — OFFICE VISIT (OUTPATIENT)
Dept: INTERNAL MEDICINE | Age: 69
End: 2019-07-24

## 2019-07-24 VITALS
HEART RATE: 83 BPM | BODY MASS INDEX: 40.2 KG/M2 | RESPIRATION RATE: 18 BRPM | SYSTOLIC BLOOD PRESSURE: 149 MMHG | WEIGHT: 235.5 LBS | TEMPERATURE: 97 F | DIASTOLIC BLOOD PRESSURE: 69 MMHG | HEIGHT: 64 IN

## 2019-07-24 DIAGNOSIS — Z00.00 MEDICARE ANNUAL WELLNESS VISIT, SUBSEQUENT: Primary | ICD-10-CM

## 2019-07-24 DIAGNOSIS — M25.549 PAIN IN MULTIPLE FINGER JOINTS: ICD-10-CM

## 2019-07-24 DIAGNOSIS — M17.0 OSTEOARTHRITIS OF BOTH KNEES, UNSPECIFIED OSTEOARTHRITIS TYPE: ICD-10-CM

## 2019-07-24 DIAGNOSIS — R39.81 URINARY INCONTINENCE, FUNCTIONAL: ICD-10-CM

## 2019-07-24 DIAGNOSIS — H57.9 ABNORMAL VISION SCREEN: ICD-10-CM

## 2019-07-24 DIAGNOSIS — Z86.39 HISTORY OF VITAMIN D DEFICIENCY: ICD-10-CM

## 2019-07-24 PROCEDURE — G0439 PPPS, SUBSEQ VISIT: HCPCS | Performed by: INTERNAL MEDICINE

## 2019-07-24 RX ORDER — LIDOCAINE 40 MG/G
CREAM TOPICAL PRN
Qty: 30 G | Refills: 4 | Status: SHIPPED | OUTPATIENT
Start: 2019-07-24 | End: 2022-07-26 | Stop reason: SDUPTHER

## 2019-07-24 RX ORDER — ERGOCALCIFEROL 1.25 MG/1
50000 CAPSULE ORAL
Qty: 12 CAPSULE | Refills: 2 | Status: SHIPPED | OUTPATIENT
Start: 2019-07-24 | End: 2021-06-22 | Stop reason: ALTCHOICE

## 2019-07-24 ASSESSMENT — PATIENT HEALTH QUESTIONNAIRE - PHQ9
1. LITTLE INTEREST OR PLEASURE IN DOING THINGS: NOT AT ALL
SUM OF ALL RESPONSES TO PHQ9 QUESTIONS 1 AND 2: 0
SUM OF ALL RESPONSES TO PHQ9 QUESTIONS 1 AND 2: 0
2. FEELING DOWN, DEPRESSED OR HOPELESS: NOT AT ALL

## 2019-07-31 DIAGNOSIS — I10 ESSENTIAL HYPERTENSION: ICD-10-CM

## 2019-07-31 DIAGNOSIS — N18.2 CKD (CHRONIC KIDNEY DISEASE) STAGE 2, GFR 60-89 ML/MIN: ICD-10-CM

## 2019-08-01 RX ORDER — LOSARTAN POTASSIUM 25 MG/1
25 TABLET ORAL DAILY
Qty: 90 TABLET | Refills: 0 | Status: SHIPPED | OUTPATIENT
Start: 2019-08-01 | End: 2019-11-04 | Stop reason: SDUPTHER

## 2019-08-06 ENCOUNTER — OFFICE VISIT (OUTPATIENT)
Dept: OPHTHALMOLOGY | Age: 69
End: 2019-08-06

## 2019-08-06 DIAGNOSIS — H25.813 COMBINED FORMS OF AGE-RELATED CATARACT OF BOTH EYES: Primary | ICD-10-CM

## 2019-08-06 DIAGNOSIS — H52.4 HYPEROPIA OF BOTH EYES WITH ASTIGMATISM AND PRESBYOPIA: ICD-10-CM

## 2019-08-06 DIAGNOSIS — H52.203 HYPEROPIA OF BOTH EYES WITH ASTIGMATISM AND PRESBYOPIA: ICD-10-CM

## 2019-08-06 DIAGNOSIS — H52.03 HYPEROPIA OF BOTH EYES WITH ASTIGMATISM AND PRESBYOPIA: ICD-10-CM

## 2019-08-06 PROCEDURE — 92014 COMPRE OPH EXAM EST PT 1/>: CPT | Performed by: OPTOMETRIST

## 2019-08-06 PROCEDURE — 92015 DETERMINE REFRACTIVE STATE: CPT | Performed by: OPTOMETRIST

## 2019-08-06 ASSESSMENT — REFRACTION_WEARINGRX
OS_ADD: +2.25
OD_AXIS: 177
OD_ADD: +2.25
OS_CYLINDER: +0.50
OS_SPHERE: +1.00
OD_SPHERE: +0.75
OS_AXIS: 017
OD_CYLINDER: +1.25

## 2019-08-06 ASSESSMENT — EXTERNAL EXAM - LEFT EYE: OS_EXAM: NORMAL

## 2019-08-06 ASSESSMENT — CONF VISUAL FIELD
METHOD: COUNTING FINGERS
OD_NORMAL: 1
OS_NORMAL: 1

## 2019-08-06 ASSESSMENT — TONOMETRY
OS_IOP_MMHG: 18
IOP_METHOD: TONOPEN
OD_IOP_MMHG: 15

## 2019-08-06 ASSESSMENT — VISUAL ACUITY
OS_SC: JAEGER 5
OD_SC: JAEGER 5
OS_SC: 20/40
OD_SC: 20/40
OS_SC+: -3
METHOD: SNELLEN - LINEAR
OD_SC+: -1

## 2019-08-06 ASSESSMENT — CUP TO DISC RATIO
OD_RATIO: 0.35
OS_RATIO: 0.35

## 2019-08-06 ASSESSMENT — SLIT LAMP EXAM - LIDS
COMMENTS: NORMAL
COMMENTS: NORMAL

## 2019-08-06 ASSESSMENT — EXTERNAL EXAM - RIGHT EYE: OD_EXAM: NORMAL

## 2019-08-12 ENCOUNTER — TELEPHONE (OUTPATIENT)
Dept: TELEHEALTH | Age: 69
End: 2019-08-12

## 2019-08-13 ENCOUNTER — TELEPHONE (OUTPATIENT)
Dept: INTERNAL MEDICINE | Age: 69
End: 2019-08-13

## 2019-08-13 DIAGNOSIS — N60.02 BREAST CYST, LEFT: Primary | ICD-10-CM

## 2019-08-13 DIAGNOSIS — Z12.39 SCREENING FOR BREAST CANCER: ICD-10-CM

## 2019-08-14 ENCOUNTER — OFFICE VISIT (OUTPATIENT)
Dept: INTERNAL MEDICINE | Age: 69
End: 2019-08-14

## 2019-08-14 VITALS
SYSTOLIC BLOOD PRESSURE: 141 MMHG | BODY MASS INDEX: 39.99 KG/M2 | HEART RATE: 80 BPM | DIASTOLIC BLOOD PRESSURE: 79 MMHG | TEMPERATURE: 96.6 F | WEIGHT: 233 LBS

## 2019-08-14 DIAGNOSIS — Z71.89 ACP (ADVANCE CARE PLANNING): Primary | ICD-10-CM

## 2019-08-14 DIAGNOSIS — N18.2 CKD (CHRONIC KIDNEY DISEASE) STAGE 2, GFR 60-89 ML/MIN: Primary | ICD-10-CM

## 2019-08-14 DIAGNOSIS — R42 VERTIGO: ICD-10-CM

## 2019-08-14 DIAGNOSIS — Z23 NEED FOR VACCINATION: ICD-10-CM

## 2019-08-14 PROCEDURE — 90715 TDAP VACCINE 7 YRS/> IM: CPT | Performed by: INTERNAL MEDICINE

## 2019-08-14 PROCEDURE — 90471 IMMUNIZATION ADMIN: CPT | Performed by: INTERNAL MEDICINE

## 2019-08-14 PROCEDURE — 99213 OFFICE O/P EST LOW 20 MIN: CPT | Performed by: INTERNAL MEDICINE

## 2019-08-14 ASSESSMENT — ENCOUNTER SYMPTOMS
NUMBNESS: 0
HEADACHES: 1
WEAKNESS: 0

## 2019-08-15 LAB
CREAT UR-MCNC: 214 MG/DL
MICROALBUMIN UR-MCNC: 9.69 MG/DL
MICROALBUMIN/CREAT UR: 45.3 MG/G

## 2019-08-16 ASSESSMENT — ENCOUNTER SYMPTOMS: SHORTNESS OF BREATH: 0

## 2019-08-19 ENCOUNTER — TELEPHONE (OUTPATIENT)
Dept: INTERNAL MEDICINE | Age: 69
End: 2019-08-19

## 2019-09-06 ENCOUNTER — OFF PREMISE CHARGES (OUTPATIENT)
Dept: INTERNAL MEDICINE | Age: 69
End: 2019-09-06

## 2019-09-06 DIAGNOSIS — I89.0 LYMPHEDEMA OF BOTH LOWER EXTREMITIES: ICD-10-CM

## 2019-09-06 DIAGNOSIS — M17.0 OSTEOARTHRITIS OF BOTH KNEES, UNSPECIFIED OSTEOARTHRITIS TYPE: ICD-10-CM

## 2019-09-06 DIAGNOSIS — I10 ESSENTIAL HYPERTENSION: ICD-10-CM

## 2019-09-06 DIAGNOSIS — M17.0 PRIMARY OSTEOARTHRITIS OF BOTH KNEES: Primary | ICD-10-CM

## 2019-09-06 DIAGNOSIS — E66.01 MORBID OBESITY (CMD): ICD-10-CM

## 2019-09-06 PROCEDURE — G0179 MD RECERTIFICATION HHA PT: HCPCS | Performed by: INTERNAL MEDICINE

## 2019-10-14 DIAGNOSIS — Z09 FOLLOW-UP EXAM, 3-6 MONTHS SINCE PREVIOUS EXAM: Primary | ICD-10-CM

## 2019-10-14 DIAGNOSIS — N60.02 CYST OF LEFT BREAST: ICD-10-CM

## 2019-10-21 DIAGNOSIS — K22.2 GERD WITH STRICTURE: ICD-10-CM

## 2019-10-21 DIAGNOSIS — K21.9 GERD WITH STRICTURE: ICD-10-CM

## 2019-10-21 DIAGNOSIS — K22.10 EROSIVE ESOPHAGITIS: ICD-10-CM

## 2019-10-21 RX ORDER — PANTOPRAZOLE SODIUM 40 MG/1
40 TABLET, DELAYED RELEASE ORAL DAILY
Qty: 90 TABLET | Refills: 2 | Status: SHIPPED | OUTPATIENT
Start: 2019-10-21 | End: 2020-09-10

## 2019-10-22 ENCOUNTER — APPOINTMENT (OUTPATIENT)
Dept: MAMMOGRAPHY | Age: 69
End: 2019-10-22
Attending: INTERNAL MEDICINE

## 2019-10-29 ENCOUNTER — APPOINTMENT (OUTPATIENT)
Dept: INTERNAL MEDICINE | Age: 69
End: 2019-10-29

## 2019-11-04 DIAGNOSIS — I10 ESSENTIAL HYPERTENSION: ICD-10-CM

## 2019-11-04 DIAGNOSIS — N18.2 CKD (CHRONIC KIDNEY DISEASE) STAGE 2, GFR 60-89 ML/MIN: ICD-10-CM

## 2019-11-04 RX ORDER — LOSARTAN POTASSIUM 25 MG/1
25 TABLET ORAL DAILY
Qty: 90 TABLET | Refills: 0 | Status: SHIPPED | OUTPATIENT
Start: 2019-11-04 | End: 2020-02-09 | Stop reason: SDUPTHER

## 2019-11-08 ENCOUNTER — HOSPITAL ENCOUNTER (OUTPATIENT)
Dept: ULTRASOUND IMAGING | Age: 69
Discharge: HOME | End: 2019-11-08
Attending: INTERNAL MEDICINE

## 2019-11-08 ENCOUNTER — HOSPITAL ENCOUNTER (OUTPATIENT)
Dept: MAMMOGRAPHY | Age: 69
Discharge: HOME | End: 2019-11-08
Attending: INTERNAL MEDICINE

## 2019-11-08 DIAGNOSIS — Z09 FOLLOW-UP EXAM, 3-6 MONTHS SINCE PREVIOUS EXAM: ICD-10-CM

## 2019-11-08 DIAGNOSIS — N60.02 BREAST CYST, LEFT: ICD-10-CM

## 2019-11-08 DIAGNOSIS — Z12.39 SCREENING FOR BREAST CANCER: ICD-10-CM

## 2019-11-08 DIAGNOSIS — N60.02 CYST OF LEFT BREAST: ICD-10-CM

## 2019-11-08 PROCEDURE — 77066 DX MAMMO INCL CAD BI: CPT

## 2019-11-08 PROCEDURE — 77066 DX MAMMO INCL CAD BI: CPT | Performed by: RADIOLOGY

## 2019-11-08 PROCEDURE — G0279 TOMOSYNTHESIS, MAMMO: HCPCS | Performed by: RADIOLOGY

## 2019-11-08 PROCEDURE — 76642 ULTRASOUND BREAST LIMITED: CPT | Performed by: RADIOLOGY

## 2019-11-08 PROCEDURE — 76642 ULTRASOUND BREAST LIMITED: CPT

## 2019-11-08 PROCEDURE — G0279 TOMOSYNTHESIS, MAMMO: HCPCS

## 2020-02-09 DIAGNOSIS — N18.2 CKD (CHRONIC KIDNEY DISEASE) STAGE 2, GFR 60-89 ML/MIN: ICD-10-CM

## 2020-02-09 DIAGNOSIS — I10 ESSENTIAL HYPERTENSION: ICD-10-CM

## 2020-02-12 RX ORDER — LOSARTAN POTASSIUM 25 MG/1
25 TABLET ORAL DAILY
Qty: 90 TABLET | Refills: 0 | Status: SHIPPED | OUTPATIENT
Start: 2020-02-12 | End: 2021-04-20 | Stop reason: SDUPTHER

## 2020-02-16 ENCOUNTER — HOSPITAL ENCOUNTER (EMERGENCY)
Age: 70
Discharge: HOME | End: 2020-02-16
Attending: EMERGENCY MEDICINE

## 2020-02-16 ENCOUNTER — APPOINTMENT (OUTPATIENT)
Dept: GENERAL RADIOLOGY | Age: 70
End: 2020-02-16
Attending: EMERGENCY MEDICINE

## 2020-02-16 VITALS
RESPIRATION RATE: 15 BRPM | DIASTOLIC BLOOD PRESSURE: 87 MMHG | BODY MASS INDEX: 40.12 KG/M2 | HEART RATE: 78 BPM | WEIGHT: 235 LBS | OXYGEN SATURATION: 100 % | TEMPERATURE: 97.7 F | HEIGHT: 64 IN | SYSTOLIC BLOOD PRESSURE: 184 MMHG

## 2020-02-16 DIAGNOSIS — M25.561 ACUTE PAIN OF RIGHT KNEE: Primary | ICD-10-CM

## 2020-02-16 PROCEDURE — 99284 EMERGENCY DEPT VISIT MOD MDM: CPT | Performed by: EMERGENCY MEDICINE

## 2020-02-16 PROCEDURE — 99283 EMERGENCY DEPT VISIT LOW MDM: CPT

## 2020-02-16 PROCEDURE — 73562 X-RAY EXAM OF KNEE 3: CPT

## 2020-02-16 PROCEDURE — 73562 X-RAY EXAM OF KNEE 3: CPT | Performed by: RADIOLOGY

## 2020-02-16 RX ORDER — HYDROCODONE BITARTRATE AND ACETAMINOPHEN 5; 325 MG/1; MG/1
1 TABLET ORAL EVERY 6 HOURS PRN
Qty: 12 TABLET | Refills: 0 | Status: SHIPPED | OUTPATIENT
Start: 2020-02-16 | End: 2021-06-22 | Stop reason: ALTCHOICE

## 2020-02-16 ASSESSMENT — PAIN SCALES - GENERAL: PAINLEVEL_OUTOF10: 9

## 2020-02-16 ASSESSMENT — PAIN DESCRIPTION - PAIN TYPE: TYPE: CHRONIC PAIN

## 2020-03-10 ENCOUNTER — IMAGING SERVICES (OUTPATIENT)
Dept: GENERAL RADIOLOGY | Age: 70
End: 2020-03-10
Attending: ORTHOPAEDIC SURGERY

## 2020-03-10 ENCOUNTER — OFFICE VISIT (OUTPATIENT)
Dept: ORTHOPEDICS | Age: 70
End: 2020-03-10
Attending: EMERGENCY MEDICINE

## 2020-03-10 VITALS
BODY MASS INDEX: 40.12 KG/M2 | HEIGHT: 64 IN | HEART RATE: 99 BPM | WEIGHT: 235 LBS | SYSTOLIC BLOOD PRESSURE: 148 MMHG | DIASTOLIC BLOOD PRESSURE: 88 MMHG

## 2020-03-10 DIAGNOSIS — M17.11 PRIMARY OSTEOARTHRITIS OF RIGHT KNEE: ICD-10-CM

## 2020-03-10 DIAGNOSIS — M17.0 PRIMARY OSTEOARTHRITIS OF BOTH KNEES: Primary | ICD-10-CM

## 2020-03-10 PROCEDURE — 99204 OFFICE O/P NEW MOD 45 MIN: CPT | Performed by: ORTHOPAEDIC SURGERY

## 2020-03-10 PROCEDURE — 73565 X-RAY EXAM OF KNEES: CPT | Performed by: RADIOLOGY

## 2020-09-10 DIAGNOSIS — K22.2 GERD WITH STRICTURE: ICD-10-CM

## 2020-09-10 DIAGNOSIS — K21.9 GERD WITH STRICTURE: ICD-10-CM

## 2020-09-10 DIAGNOSIS — K22.10 EROSIVE ESOPHAGITIS: ICD-10-CM

## 2020-09-10 RX ORDER — PANTOPRAZOLE SODIUM 40 MG/1
40 TABLET, DELAYED RELEASE ORAL DAILY
Qty: 30 TABLET | Refills: 0 | Status: SHIPPED | OUTPATIENT
Start: 2020-09-10 | End: 2021-01-06 | Stop reason: SDUPTHER

## 2020-09-10 RX ORDER — PANTOPRAZOLE SODIUM 40 MG/1
TABLET, DELAYED RELEASE ORAL
Qty: 90 TABLET | OUTPATIENT
Start: 2020-09-10

## 2020-10-10 ENCOUNTER — APPOINTMENT (OUTPATIENT)
Dept: GENERAL RADIOLOGY | Age: 70
DRG: 494 | End: 2020-10-10
Attending: EMERGENCY MEDICINE
Payer: MEDICARE

## 2020-10-10 ENCOUNTER — APPOINTMENT (OUTPATIENT)
Dept: CT IMAGING | Age: 70
DRG: 494 | End: 2020-10-10
Attending: EMERGENCY MEDICINE
Payer: MEDICARE

## 2020-10-10 ENCOUNTER — HOSPITAL ENCOUNTER (INPATIENT)
Age: 70
LOS: 1 days | Discharge: ANOTHER HOSPITAL | DRG: 494 | End: 2020-10-12
Attending: EMERGENCY MEDICINE | Admitting: FAMILY MEDICINE
Payer: MEDICARE

## 2020-10-10 DIAGNOSIS — R52 PAIN: ICD-10-CM

## 2020-10-10 DIAGNOSIS — R56.9 SEIZURE-LIKE ACTIVITY (HCC): ICD-10-CM

## 2020-10-10 DIAGNOSIS — S42.292A OTHER CLOSED DISPLACED FRACTURE OF PROXIMAL END OF LEFT HUMERUS, INITIAL ENCOUNTER: Primary | ICD-10-CM

## 2020-10-10 PROBLEM — S42.202A CLOSED FRACTURE OF LEFT PROXIMAL HUMERUS: Status: ACTIVE | Noted: 2020-10-10

## 2020-10-10 PROBLEM — D72.829 LEUKOCYTOSIS: Status: ACTIVE | Noted: 2020-10-10

## 2020-10-10 LAB
ALBUMIN SERPL-MCNC: 3.6 G/DL (ref 3.2–4.6)
ALBUMIN/GLOB SERPL: 1.1 {RATIO} (ref 1.2–3.5)
ALP SERPL-CCNC: 79 U/L (ref 50–130)
ALT SERPL-CCNC: 18 U/L (ref 12–65)
ANION GAP SERPL CALC-SCNC: 11 MMOL/L (ref 7–16)
AST SERPL-CCNC: 21 U/L (ref 15–37)
BASOPHILS # BLD: 0.1 K/UL (ref 0–0.2)
BASOPHILS NFR BLD: 0 % (ref 0–2)
BILIRUB SERPL-MCNC: 0.4 MG/DL (ref 0.2–1.1)
BUN SERPL-MCNC: 20 MG/DL (ref 8–23)
CALCIUM SERPL-MCNC: 9.3 MG/DL (ref 8.3–10.4)
CHLORIDE SERPL-SCNC: 108 MMOL/L (ref 98–107)
CO2 SERPL-SCNC: 23 MMOL/L (ref 21–32)
CREAT SERPL-MCNC: 1.01 MG/DL (ref 0.6–1)
DIFFERENTIAL METHOD BLD: ABNORMAL
EOSINOPHIL # BLD: 0 K/UL (ref 0–0.8)
EOSINOPHIL NFR BLD: 0 % (ref 0.5–7.8)
ERYTHROCYTE [DISTWIDTH] IN BLOOD BY AUTOMATED COUNT: 12.4 % (ref 11.9–14.6)
ETHANOL SERPL-MCNC: <3 MG/DL
GLOBULIN SER CALC-MCNC: 3.3 G/DL (ref 2.3–3.5)
GLUCOSE SERPL-MCNC: 157 MG/DL (ref 65–100)
HCT VFR BLD AUTO: 42.6 % (ref 35.8–46.3)
HGB BLD-MCNC: 13.7 G/DL (ref 11.7–15.4)
IMM GRANULOCYTES # BLD AUTO: 0.1 K/UL (ref 0–0.5)
IMM GRANULOCYTES NFR BLD AUTO: 0 % (ref 0–5)
LYMPHOCYTES # BLD: 0.9 K/UL (ref 0.5–4.6)
LYMPHOCYTES NFR BLD: 6 % (ref 13–44)
MAGNESIUM SERPL-MCNC: 1.9 MG/DL (ref 1.8–2.4)
MCH RBC QN AUTO: 26.9 PG (ref 26.1–32.9)
MCHC RBC AUTO-ENTMCNC: 32.2 G/DL (ref 31.4–35)
MCV RBC AUTO: 83.5 FL (ref 79.6–97.8)
MONOCYTES # BLD: 0.4 K/UL (ref 0.1–1.3)
MONOCYTES NFR BLD: 3 % (ref 4–12)
NEUTS SEG # BLD: 13.1 K/UL (ref 1.7–8.2)
NEUTS SEG NFR BLD: 90 % (ref 43–78)
NRBC # BLD: 0 K/UL (ref 0–0.2)
PLATELET # BLD AUTO: 246 K/UL (ref 150–450)
PMV BLD AUTO: 10.8 FL (ref 9.4–12.3)
POTASSIUM SERPL-SCNC: 4 MMOL/L (ref 3.5–5.1)
PROT SERPL-MCNC: 6.9 G/DL (ref 6.3–8.2)
RBC # BLD AUTO: 5.1 M/UL (ref 4.05–5.2)
SODIUM SERPL-SCNC: 142 MMOL/L (ref 136–145)
TROPONIN-HIGH SENSITIVITY: 9.3 PG/ML (ref 0–14)
WBC # BLD AUTO: 14.5 K/UL (ref 4.3–11.1)

## 2020-10-10 PROCEDURE — 84484 ASSAY OF TROPONIN QUANT: CPT

## 2020-10-10 PROCEDURE — 99285 EMERGENCY DEPT VISIT HI MDM: CPT

## 2020-10-10 PROCEDURE — 83735 ASSAY OF MAGNESIUM: CPT

## 2020-10-10 PROCEDURE — 70450 CT HEAD/BRAIN W/O DYE: CPT

## 2020-10-10 PROCEDURE — 96376 TX/PRO/DX INJ SAME DRUG ADON: CPT

## 2020-10-10 PROCEDURE — 81003 URINALYSIS AUTO W/O SCOPE: CPT

## 2020-10-10 PROCEDURE — 36415 COLL VENOUS BLD VENIPUNCTURE: CPT

## 2020-10-10 PROCEDURE — 93005 ELECTROCARDIOGRAM TRACING: CPT | Performed by: EMERGENCY MEDICINE

## 2020-10-10 PROCEDURE — 99218 HC RM OBSERVATION: CPT

## 2020-10-10 PROCEDURE — 74011250636 HC RX REV CODE- 250/636: Performed by: EMERGENCY MEDICINE

## 2020-10-10 PROCEDURE — 2W3BX1Z IMMOBILIZATION OF LEFT UPPER ARM USING SPLINT: ICD-10-PCS | Performed by: EMERGENCY MEDICINE

## 2020-10-10 PROCEDURE — 75810000053 HC SPLINT APPLICATION

## 2020-10-10 PROCEDURE — 74011250636 HC RX REV CODE- 250/636: Performed by: FAMILY MEDICINE

## 2020-10-10 PROCEDURE — 80053 COMPREHEN METABOLIC PANEL: CPT

## 2020-10-10 PROCEDURE — 74011250637 HC RX REV CODE- 250/637: Performed by: FAMILY MEDICINE

## 2020-10-10 PROCEDURE — 74011250637 HC RX REV CODE- 250/637: Performed by: EMERGENCY MEDICINE

## 2020-10-10 PROCEDURE — 96372 THER/PROPH/DIAG INJ SC/IM: CPT

## 2020-10-10 PROCEDURE — 96374 THER/PROPH/DIAG INJ IV PUSH: CPT

## 2020-10-10 PROCEDURE — 85025 COMPLETE CBC W/AUTO DIFF WBC: CPT

## 2020-10-10 PROCEDURE — 96375 TX/PRO/DX INJ NEW DRUG ADDON: CPT

## 2020-10-10 PROCEDURE — 73060 X-RAY EXAM OF HUMERUS: CPT

## 2020-10-10 PROCEDURE — 80307 DRUG TEST PRSMV CHEM ANLYZR: CPT

## 2020-10-10 RX ORDER — BUTORPHANOL TARTRATE 2 MG/ML
0.5 INJECTION INTRAMUSCULAR; INTRAVENOUS
Status: COMPLETED | OUTPATIENT
Start: 2020-10-10 | End: 2020-10-10

## 2020-10-10 RX ORDER — MORPHINE SULFATE 2 MG/ML
6 INJECTION, SOLUTION INTRAMUSCULAR; INTRAVENOUS
Status: COMPLETED | OUTPATIENT
Start: 2020-10-10 | End: 2020-10-10

## 2020-10-10 RX ORDER — HYDRALAZINE HYDROCHLORIDE 10 MG/1
10 TABLET, FILM COATED ORAL ONCE
Status: COMPLETED | OUTPATIENT
Start: 2020-10-10 | End: 2020-10-10

## 2020-10-10 RX ORDER — FAMOTIDINE 20 MG/1
20 TABLET, FILM COATED ORAL 2 TIMES DAILY
Status: DISCONTINUED | OUTPATIENT
Start: 2020-10-10 | End: 2020-10-12 | Stop reason: HOSPADM

## 2020-10-10 RX ORDER — ENOXAPARIN SODIUM 100 MG/ML
40 INJECTION SUBCUTANEOUS EVERY 24 HOURS
Status: DISCONTINUED | OUTPATIENT
Start: 2020-10-10 | End: 2020-10-12 | Stop reason: HOSPADM

## 2020-10-10 RX ORDER — ONDANSETRON 2 MG/ML
4 INJECTION INTRAMUSCULAR; INTRAVENOUS
Status: COMPLETED | OUTPATIENT
Start: 2020-10-10 | End: 2020-10-10

## 2020-10-10 RX ORDER — SODIUM CHLORIDE 0.9 % (FLUSH) 0.9 %
5-40 SYRINGE (ML) INJECTION AS NEEDED
Status: DISCONTINUED | OUTPATIENT
Start: 2020-10-10 | End: 2020-10-12 | Stop reason: HOSPADM

## 2020-10-10 RX ORDER — MORPHINE SULFATE 2 MG/ML
2 INJECTION, SOLUTION INTRAMUSCULAR; INTRAVENOUS
Status: DISCONTINUED | OUTPATIENT
Start: 2020-10-10 | End: 2020-10-12 | Stop reason: HOSPADM

## 2020-10-10 RX ORDER — SODIUM CHLORIDE 0.9 % (FLUSH) 0.9 %
5-40 SYRINGE (ML) INJECTION EVERY 8 HOURS
Status: DISCONTINUED | OUTPATIENT
Start: 2020-10-10 | End: 2020-10-12 | Stop reason: HOSPADM

## 2020-10-10 RX ORDER — SODIUM CHLORIDE 9 MG/ML
150 INJECTION, SOLUTION INTRAVENOUS CONTINUOUS
Status: DISCONTINUED | OUTPATIENT
Start: 2020-10-10 | End: 2020-10-11

## 2020-10-10 RX ORDER — ACETAMINOPHEN 500 MG
1000 TABLET ORAL
Status: COMPLETED | OUTPATIENT
Start: 2020-10-10 | End: 2020-10-10

## 2020-10-10 RX ORDER — DIPHENHYDRAMINE HCL 25 MG
25 CAPSULE ORAL
Status: DISCONTINUED | OUTPATIENT
Start: 2020-10-10 | End: 2020-10-12 | Stop reason: HOSPADM

## 2020-10-10 RX ADMIN — ONDANSETRON 4 MG: 2 INJECTION INTRAMUSCULAR; INTRAVENOUS at 16:29

## 2020-10-10 RX ADMIN — ENOXAPARIN SODIUM 40 MG: 40 INJECTION SUBCUTANEOUS at 20:49

## 2020-10-10 RX ADMIN — MORPHINE SULFATE 2 MG: 2 INJECTION, SOLUTION INTRAMUSCULAR; INTRAVENOUS at 20:49

## 2020-10-10 RX ADMIN — HYDRALAZINE HYDROCHLORIDE 10 MG: 10 TABLET, FILM COATED ORAL at 21:06

## 2020-10-10 RX ADMIN — ACETAMINOPHEN 1000 MG: 500 TABLET, FILM COATED ORAL at 16:31

## 2020-10-10 RX ADMIN — SODIUM CHLORIDE 150 ML/HR: 9 INJECTION, SOLUTION INTRAVENOUS at 19:48

## 2020-10-10 RX ADMIN — MORPHINE SULFATE 6 MG: 2 INJECTION, SOLUTION INTRAMUSCULAR; INTRAVENOUS at 18:12

## 2020-10-10 RX ADMIN — FAMOTIDINE 20 MG: 20 TABLET ORAL at 20:50

## 2020-10-10 RX ADMIN — BUTORPHANOL TARTRATE 0.5 MG: 2 INJECTION, SOLUTION INTRAMUSCULAR; INTRAVENOUS at 16:31

## 2020-10-10 RX ADMIN — Medication 5 ML: at 20:52

## 2020-10-10 NOTE — ED NOTES
Pt assisted to lobby to wait for  to get the car and became nauseated and very pale. Attempted to sit in a car but it was causing lots of pain. Pt passed out and with some shaking. Took about 15 secs for pt to relax and about one minute to open eyes and be able to nod that she could hear me. Assisted pt back on stretcher in the lobby and brought back to room 10. Dr. Adilia Flores to bedside and pt placed on monitors x3.

## 2020-10-10 NOTE — H&P
HOSPITALIST INITIAL HISTORY AND PHYSICAL    NAME:  Donavon Mcrae   Age:  69 y.o.  :   1950   MRN:   [id number]  PCP: None  Consulting MD:  Treatment Team: Attending Provider: Lynne Don MD; Primary Nurse: Abdiel Beard RN    CHIEF COMPLAINT: fall, subsequent LOC    HISTORY OF PRESENT ILLNESS:   Donavon Mcrae is a 69 y.o. female with a past medical history of GERD, hypothyroidism who presents to the ER after a fall at home from tripping on a mat. She landed on her L arm and had immediate severe pain in her L shoulder. She came to the ER and was diagnosed with proximal L humeral fracture and was was discharged. However, after waiting in the ER lobby to be picked up by her , she attempted to ambulate to her car, the patient reports that she felt very lightheaded and dizzy, and then lost consciousness. Per staff, she was unconscious for about 15 seconds and had some rhythmic shaking movements during this time. The patient reports feeling fine now, admitting to improvement of pain after IV morphine. Chante Temple REVIEW OF SYSTEMS: Comprehensive ROS performed. Denies fevers, chills, nausea, vomiting, otherwise negative. Past Medical History:   Diagnosis Date    Arthritis     osteo    Chronic pain     r/t arthritis in hip and knees    DDD (degenerative disc disease)     \"back\"    GERD (gastroesophageal reflux disease)     controlled w/med    Psychiatric disorder     PTSD, severe anxiety with medical procedures    Thyroid disease     history of hypo in past. No current issues, no meds        Past Surgical History:   Procedure Laterality Date    HX HIP REPLACEMENT Right 2014       Prior to Admission Medications   Prescriptions Last Dose Informant Patient Reported? Taking?   acetaminophen (TYLENOL) 325 mg tablet   Yes No   Sig: Take 500 mg by mouth two (2) times a day.  Indications: PAIN   diazepam (VALIUM) 5 mg tablet   Yes No   Sig: Take 5 mg by mouth every six (6) hours as needed for Anxiety. Take / use AM day of surgery  per anesthesia protocols if needed. Indications: ANXIETY   diphenhydrAMINE (BENADRYL) 25 mg capsule   Yes No   Sig: Take 25 mg by mouth nightly as needed for Sleep. Indications: INSOMNIA   multivitamin (ONE A DAY) tablet   Yes No   Sig: Take 1 Tab by mouth daily. naproxen sodium (ALEVE) 220 mg tablet   Yes No   Sig: Take 220 mg by mouth three (3) times daily as needed. ranitidine (ZANTAC) 150 mg tablet   Yes No   Sig: Take 150 mg by mouth daily. Take / use AM day of surgery  per anesthesia protocols. Indications: GASTROESOPHAGEAL REFLUX      Facility-Administered Medications: None       Allergies   Allergen Reactions    Oxycodone Nausea and Vomiting and Other (comments)     \" incoherent \"     Sulfa (Sulfonamide Antibiotics) Rash       FAMILY HISTORY: Reviewed. Negative except   Family History   Problem Relation Age of Onset    Diabetes Mother     Arthritis-osteo Mother     Cancer Mother     Arthritis-osteo Father     Lung Disease Father        Social History     Tobacco Use    Smoking status: Never Smoker    Smokeless tobacco: Never Used   Substance Use Topics    Alcohol use: Yes     Comment: occasionally         Objective:     Visit Vitals  BP (!) 162/72   Pulse 68   Temp 97.9 °F (36.6 °C)   Resp 16   Ht 5' 5\" (1.651 m)   Wt 104.3 kg (230 lb)   SpO2 98%   BMI 38.27 kg/m²      Temp (24hrs), Av.9 °F (36.6 °C), Min:97.9 °F (36.6 °C), Max:97.9 °F (36.6 °C)    Oxygen Therapy  O2 Sat (%): 98 % (10/10/20 1840)  Pulse via Oximetry: 69 beats per minute (10/10/20 1840)  O2 Device: Room air (10/10/20 1840)  Physical Exam:  General:    The patient is a very pleasant elderly female in no acute distress. Head:   Normocephalic/atraumatic. Eyes:  No palpebral pallor or scleral icterus. ENT:  External auricular and nasal exam within normal limits. Mucous membranes are moist.  Neck:  Supple, non-tender, no JVD.   Lungs:   Clear to auscultation bilaterally without wheezes or crackles. No respiratory distress or accessory muscle use. Heart:   Regular rate and rhythm, without murmurs, rubs, or gallops. Abdomen:   Soft, non-tender, non-distended with normoactive bowel sounds. Genitourinary: No tenderness over the bladder or bilateral CVAs. Extremities: Without clubbing, cyanosis, or edema. Skin:     Normal color, texture, and turgor. No rashes, lesions, or jaundice. Pulses: Radial and dorsalis pedis pulses present 2+ bilaterally. Capillary refill <2s. Neurologic: CN II-XII grossly intact and symmetrical.     Moving all four extremities well with no focal deficits. Psychiatric: Pleasant demeanor, appropriate affect. Alert and oriented x 3    Data Review:   Recent Results (from the past 24 hour(s))   CBC WITH AUTOMATED DIFF    Collection Time: 10/10/20  5:47 PM   Result Value Ref Range    WBC 14.5 (H) 4.3 - 11.1 K/uL    RBC 5.10 4.05 - 5.2 M/uL    HGB 13.7 11.7 - 15.4 g/dL    HCT 42.6 35.8 - 46.3 %    MCV 83.5 79.6 - 97.8 FL    MCH 26.9 26.1 - 32.9 PG    MCHC 32.2 31.4 - 35.0 g/dL    RDW 12.4 11.9 - 14.6 %    PLATELET 604 861 - 487 K/uL    MPV 10.8 9.4 - 12.3 FL    ABSOLUTE NRBC 0.00 0.0 - 0.2 K/uL    DF AUTOMATED      NEUTROPHILS 90 (H) 43 - 78 %    LYMPHOCYTES 6 (L) 13 - 44 %    MONOCYTES 3 (L) 4.0 - 12.0 %    EOSINOPHILS 0 (L) 0.5 - 7.8 %    BASOPHILS 0 0.0 - 2.0 %    IMMATURE GRANULOCYTES 0 0.0 - 5.0 %    ABS. NEUTROPHILS 13.1 (H) 1.7 - 8.2 K/UL    ABS. LYMPHOCYTES 0.9 0.5 - 4.6 K/UL    ABS. MONOCYTES 0.4 0.1 - 1.3 K/UL    ABS. EOSINOPHILS 0.0 0.0 - 0.8 K/UL    ABS. BASOPHILS 0.1 0.0 - 0.2 K/UL    ABS. IMM.  GRANS. 0.1 0.0 - 0.5 K/UL   METABOLIC PANEL, COMPREHENSIVE    Collection Time: 10/10/20  5:47 PM   Result Value Ref Range    Sodium 142 136 - 145 mmol/L    Potassium 4.0 3.5 - 5.1 mmol/L    Chloride 108 (H) 98 - 107 mmol/L    CO2 23 21 - 32 mmol/L    Anion gap 11 7 - 16 mmol/L    Glucose 157 (H) 65 - 100 mg/dL    BUN 20 8 - 23 MG/DL    Creatinine 1.01 (H) 0.6 - 1.0 MG/DL    GFR est AA >60 >60 ml/min/1.73m2    GFR est non-AA 57 (L) >60 ml/min/1.73m2    Calcium 9.3 8.3 - 10.4 MG/DL    Bilirubin, total 0.4 0.2 - 1.1 MG/DL    ALT (SGPT) 18 12 - 65 U/L    AST (SGOT) 21 15 - 37 U/L    Alk. phosphatase 79 50 - 130 U/L    Protein, total 6.9 6.3 - 8.2 g/dL    Albumin 3.6 3.2 - 4.6 g/dL    Globulin 3.3 2.3 - 3.5 g/dL    A-G Ratio 1.1 (L) 1.2 - 3.5     MAGNESIUM    Collection Time: 10/10/20  5:47 PM   Result Value Ref Range    Magnesium 1.9 1.8 - 2.4 mg/dL   TROPONIN-HIGH SENSITIVITY    Collection Time: 10/10/20  5:47 PM   Result Value Ref Range    Troponin-High Sensitivity 9.3 0 - 14 pg/mL       Imaging Leo Rg /Studies:  Xr Humerus Lt    Result Date: 10/10/2020  IMPRESSION: 1. Displaced fracture of the proximal left humerus as described above. Ct Head Wo Cont    Result Date: 10/10/2020  IMPRESSION: 1. No evidence of acute intracranial abnormality. Assessment and Plan:     Principal Problem:    Seizure ([address] (10/10/2020)    Vs. Syncope. Report of becoming pale and then losing consciousness, followed by some shaking. Will order MRI brain, TTE, EEG, consult neuro. IVF. Monitor on remote tele. Active Problems:    Leukocytosis (10/10/2020)    Check UA, follow CBC      Closed fracture of left proximal humerus (10/10/2020)    Consult orthopedics. Pain control. DVT Prophylaxis: Lovenox      Code Status: FULL CODE      Disposition: Observe on remote tele for evaluation and treatment as per above.       Anticipated discharge: < 2 midnights     Signed By: Angelica Castillo MD     October 10, 2020

## 2020-10-10 NOTE — ED NOTES
TRANSFER - OUT REPORT:    Verbal report given to Nasir Staff on [employer]  being transferred to  for routine progression of care       Report consisted of patients Situation, Background, Assessment and   Recommendations(SBAR). Information from the following report(s) SBAR, ED Summary and MAR was reviewed with the receiving nurse. Lines:   Peripheral IV 10/10/20 Right Hand (Active)   Site Assessment Clean, dry, & intact 10/10/20 1750   Phlebitis Assessment 0 10/10/20 1750   Infiltration Assessment 0 10/10/20 1750   Dressing Status Clean, dry, & intact 10/10/20 1750        Opportunity for questions and clarification was provided.       Patient transported with:   [employer]

## 2020-10-10 NOTE — ED PROVIDER NOTES
43-year-old female presents after a fall. Her shoe got stuck on the foam mat in the kitchen. She tried to catch herself with the countertop, but fell, landing on both knees and hands. She states she \"wrenched\" her left shoulder. She has severe pain in her left arm. No other injuries. Denies numbness. Fall   Pertinent negatives include no fever, no numbness and no vomiting.         Past Medical History:   Diagnosis Date    Arthritis     osteo    Chronic pain     r/t arthritis in hip and knees    DDD (degenerative disc disease)     \"back\"    GERD (gastroesophageal reflux disease)     controlled w/med    Psychiatric disorder     PTSD, severe anxiety with medical procedures    Thyroid disease     history of hypo in past. No current issues, no meds       Past Surgical History:   Procedure Laterality Date    HX HIP REPLACEMENT Right 04/2014         Family History:   Problem Relation Age of Onset    Diabetes Mother     Arthritis-osteo Mother     Cancer Mother     Arthritis-osteo Father     Lung Disease Father        Social History     Socioeconomic History    Marital status:      Spouse name: Not on file    Number of children: Not on file    Years of education: Not on file    Highest education level: Not on file   Occupational History    Not on file   Social Needs    Financial resource strain: Not on file    Food insecurity     Worry: Not on file     Inability: Not on file    Transportation needs     Medical: Not on file     Non-medical: Not on file   Tobacco Use    Smoking status: Never Smoker    Smokeless tobacco: Never Used   Substance and Sexual Activity    Alcohol use: Yes     Comment: occasionally    Drug use: No    Sexual activity: Not on file   Lifestyle    Physical activity     Days per week: Not on file     Minutes per session: Not on file    Stress: Not on file   Relationships    Social connections     Talks on phone: Not on file     Gets together: Not on file Attends Synagogue service: Not on file     Active member of club or organization: Not on file     Attends meetings of clubs or organizations: Not on file     Relationship status: Not on file    Intimate partner violence     Fear of current or ex partner: Not on file     Emotionally abused: Not on file     Physically abused: Not on file     Forced sexual activity: Not on file   Other Topics Concern    Not on file   Social History Narrative    Not on file         ALLERGIES: Oxycodone and Sulfa (sulfonamide antibiotics)    Review of Systems   Constitutional: Negative for fever. Eyes: Negative for visual disturbance. Gastrointestinal: Negative for vomiting. Musculoskeletal: Positive for arthralgias and myalgias. Negative for back pain. Skin: Negative for wound. Neurological: Negative for weakness and numbness. Vitals:    10/10/20 1428   BP: (!) 197/82   Pulse: 78   Resp: 16   Temp: 97.9 °F (36.6 °C)   SpO2: 99%   Weight: 104.3 kg (230 lb)   Height: 5' 5\" (1.651 m)            Physical Exam  Vitals signs and nursing note reviewed. Constitutional:       Appearance: Normal appearance. HENT:      Head: Normocephalic and atraumatic. Mouth/Throat:      Mouth: Mucous membranes are moist.   Cardiovascular:      Rate and Rhythm: Normal rate. Pulmonary:      Effort: Pulmonary effort is normal.   Musculoskeletal:      Left shoulder: She exhibits pain. She exhibits normal range of motion, no tenderness and no swelling. Left upper arm: She exhibits tenderness, bony tenderness and swelling. Comments: Normal distal pulses   Skin:     General: Skin is dry. Capillary Refill: Capillary refill takes less than 2 seconds. Neurological:      General: No focal deficit present. Mental Status: She is alert.    Psychiatric:         Mood and Affect: Mood normal.          MDM  Number of Diagnoses or Management Options  Diagnosis management comments: Parts of this document were created using dragon voice recognition software. The chart has been reviewed but errors may still be present. I wore appropriate PPE throughout this patient's ED visit. Katharina Mixon MD, 2:39 PM    3:42 PM  Images sent to ortho JEFFERY Reyes and reviewed. No indication for fracture reduction in ED. Will fu in office on Monday. Pain treated. I discussed the results of all labs, procedures, radiographs, and treatments with the patient and available family. Treatment plan is agreed upon and the patient is ready for discharge. Questions about treatment in the ED and differential diagnosis of presenting condition were answered. Patient was given verbal discharge instructions including, but not limited to, importance of returning to the emergency department for any concern of worsening or continued symptoms. Instructions were given to follow up with a primary care provider or specialist within 1-2 days. Adverse effects of medications, if prescribed, were discussed and patient was advised to refrain from significant physical activity until followed up by primary care physician and to not drive or operate heavy machinery after taking any sedating substances. Amount and/or Complexity of Data Reviewed  Tests in the radiology section of CPT®: ordered and reviewed (Xr Humerus Lt    Result Date: 10/10/2020  Left humerus radiographs 10/10/2020 Clinical history: Fall with left upper arm pain. FINDINGS: Frontal and oblique views of the left humerus are submitted. There is a displaced fracture evolving the neck of the proximal left humerus. There is significant posterior displacement of the major distal fracture fragment by nearly a shaft's width with additional medial displacement by approximately a half shafts width. No acute fracture is otherwise seen. IMPRESSION: 1.  Displaced fracture of the proximal left humerus as described above.    )  Tests in the medicine section of CPT®: ordered and reviewed           Splint, Other    Date/Time: 10/10/2020 4:10 PM  Performed by: Italo Hackett MD  Authorized by: Italo Hackett MD     Consent:     Consent obtained:  Verbal    Consent given by:  Patient    Risks discussed:  Swelling and pain    Alternatives discussed:  Referral  Pre-procedure details:     Sensation:  Normal  Procedure details:     Laterality:  Left    Location:  Arm    Arm:  L upper arm    Splint type: coaptation. Supplies:  Ortho-Glass  Post-procedure details:     Pain:  Improved    Sensation:  Normal    Patient tolerance of procedure: Tolerated well, no immediate complications            ===================================================  Patient was reportedly in the waiting room awaiting her  to pick her up in his vehicle when she was noted to be in severe pain secondary to her left humerus fracture. Patient reportedly has \"bad reactions to pain medications in the past \". Patient was given Stadol IM earlier in ED visit. Nurse was present in waiting room with patient. Patient reportedly had seizure-like activity and had positive loss of consciousness. Patient was placed on nonrebreather and placed back in her room. Patient slightly confused. No tongue trauma. No focal deficits. Patient with no complaints of chest pain, shortness of breath. Patient denies history of previous seizure-like activity. Patient states that she does not drink alcohol on a regular basis or have a history of alcohol withdrawal seizures. Will obtain CBC, CMP, magnesium, troponin, UA, EKG. Will observe at this time to ensure improvement of pain and no continued seizure-like activity. Patient states that she did not hit her head earlier today during fall however given new onset seizure-like activity will obtain CT head. Radial pulses 2+ and equal bilaterally. Left upper extremity in sling and swath. ECG w/ NSR. HR 72. T wave inversions and inferior lateral leads.       Informed nursing staff that would like patient to be placed under new visit given patient with new complaint and issue at this time. State that they did not want to put her under for annual visit as she would get charged for second visit as she had not left the lobby when incident occurred. Patient's states that she is willing to receive morphine IV at this time.     Lakisha Hodges MD

## 2020-10-10 NOTE — DISCHARGE INSTRUCTIONS
Call orthopedics Monday to arrange follow-up on Monday with a shoulder specialist.  Take pain medication as needed. Return for worsening or concerning symptoms.

## 2020-10-10 NOTE — ED NOTES
Morphine and zofran offered as ordered. Patient reports she is not in any pain at this time and refuses pharmaceutical pain relief. Comfort measures including pillow and ice pack provided.

## 2020-10-10 NOTE — ED NOTES
Patient advises trip and fall in kitchen over thick memory foam mat, landing on bilateral hands and knees, complaining of left upper arm pain. Mask on during triage, no loss of consciousness.

## 2020-10-11 ENCOUNTER — APPOINTMENT (OUTPATIENT)
Dept: MRI IMAGING | Age: 70
DRG: 494 | End: 2020-10-11
Attending: FAMILY MEDICINE
Payer: MEDICARE

## 2020-10-11 PROBLEM — R55 SYNCOPE: Status: ACTIVE | Noted: 2020-10-11

## 2020-10-11 PROBLEM — S42.309A HUMERUS FRACTURE: Status: ACTIVE | Noted: 2020-10-11

## 2020-10-11 LAB
ANION GAP SERPL CALC-SCNC: 8 MMOL/L (ref 7–16)
APPEARANCE UR: CLEAR
ATRIAL RATE: 72 BPM
BACTERIA URNS QL MICRO: 0 /HPF
BASOPHILS # BLD: 0 K/UL (ref 0–0.2)
BASOPHILS NFR BLD: 0 % (ref 0–2)
BILIRUB UR QL: ABNORMAL
BUN SERPL-MCNC: 25 MG/DL (ref 8–23)
CALCIUM SERPL-MCNC: 8.3 MG/DL (ref 8.3–10.4)
CALCULATED P AXIS, ECG09: 47 DEGREES
CALCULATED R AXIS, ECG10: 23 DEGREES
CALCULATED T AXIS, ECG11: 87 DEGREES
CASTS URNS QL MICRO: ABNORMAL /LPF
CHLORIDE SERPL-SCNC: 110 MMOL/L (ref 98–107)
CO2 SERPL-SCNC: 23 MMOL/L (ref 21–32)
COLOR UR: YELLOW
CREAT SERPL-MCNC: 0.9 MG/DL (ref 0.6–1)
DIAGNOSIS, 93000: NORMAL
DIFFERENTIAL METHOD BLD: ABNORMAL
EOSINOPHIL # BLD: 0 K/UL (ref 0–0.8)
EOSINOPHIL NFR BLD: 0 % (ref 0.5–7.8)
EPI CELLS #/AREA URNS HPF: ABNORMAL /HPF
ERYTHROCYTE [DISTWIDTH] IN BLOOD BY AUTOMATED COUNT: 12.8 % (ref 11.9–14.6)
GLUCOSE SERPL-MCNC: 148 MG/DL (ref 65–100)
GLUCOSE UR STRIP.AUTO-MCNC: NEGATIVE MG/DL
HCT VFR BLD AUTO: 37.1 % (ref 35.8–46.3)
HGB BLD-MCNC: 12.1 G/DL (ref 11.7–15.4)
HGB UR QL STRIP: NEGATIVE
IMM GRANULOCYTES # BLD AUTO: 0 K/UL (ref 0–0.5)
IMM GRANULOCYTES NFR BLD AUTO: 0 % (ref 0–5)
KETONES UR QL STRIP.AUTO: NEGATIVE MG/DL
LEUKOCYTE ESTERASE UR QL STRIP.AUTO: NEGATIVE
LYMPHOCYTES # BLD: 1.9 K/UL (ref 0.5–4.6)
LYMPHOCYTES NFR BLD: 17 % (ref 13–44)
MAGNESIUM SERPL-MCNC: 1.9 MG/DL (ref 1.8–2.4)
MCH RBC QN AUTO: 26.7 PG (ref 26.1–32.9)
MCHC RBC AUTO-ENTMCNC: 32.6 G/DL (ref 31.4–35)
MCV RBC AUTO: 81.9 FL (ref 79.6–97.8)
MONOCYTES # BLD: 1 K/UL (ref 0.1–1.3)
MONOCYTES NFR BLD: 9 % (ref 4–12)
NEUTS SEG # BLD: 8.2 K/UL (ref 1.7–8.2)
NEUTS SEG NFR BLD: 73 % (ref 43–78)
NITRITE UR QL STRIP.AUTO: NEGATIVE
NRBC # BLD: 0 K/UL (ref 0–0.2)
P-R INTERVAL, ECG05: 154 MS
PH UR STRIP: 5.5 [PH] (ref 5–9)
PLATELET # BLD AUTO: 263 K/UL (ref 150–450)
PMV BLD AUTO: 10.9 FL (ref 9.4–12.3)
POTASSIUM SERPL-SCNC: 3.8 MMOL/L (ref 3.5–5.1)
PROT UR STRIP-MCNC: ABNORMAL MG/DL
Q-T INTERVAL, ECG07: 376 MS
QRS DURATION, ECG06: 94 MS
QTC CALCULATION (BEZET), ECG08: 411 MS
RBC # BLD AUTO: 4.53 M/UL (ref 4.05–5.2)
RBC #/AREA URNS HPF: ABNORMAL /HPF
SODIUM SERPL-SCNC: 141 MMOL/L (ref 136–145)
SP GR UR REFRACTOMETRY: 1.04 (ref 1–1.02)
UROBILINOGEN UR QL STRIP.AUTO: 0.2 EU/DL (ref 0.2–1)
VENTRICULAR RATE, ECG03: 72 BPM
WBC # BLD AUTO: 11.2 K/UL (ref 4.3–11.1)
WBC URNS QL MICRO: ABNORMAL /HPF

## 2020-10-11 PROCEDURE — 70551 MRI BRAIN STEM W/O DYE: CPT

## 2020-10-11 PROCEDURE — 96375 TX/PRO/DX INJ NEW DRUG ADDON: CPT

## 2020-10-11 PROCEDURE — 99218 HC RM OBSERVATION: CPT

## 2020-10-11 PROCEDURE — 83735 ASSAY OF MAGNESIUM: CPT

## 2020-10-11 PROCEDURE — 36415 COLL VENOUS BLD VENIPUNCTURE: CPT

## 2020-10-11 PROCEDURE — 99222 1ST HOSP IP/OBS MODERATE 55: CPT | Performed by: PSYCHIATRY & NEUROLOGY

## 2020-10-11 PROCEDURE — 96376 TX/PRO/DX INJ SAME DRUG ADON: CPT

## 2020-10-11 PROCEDURE — 80048 BASIC METABOLIC PNL TOTAL CA: CPT

## 2020-10-11 PROCEDURE — 85025 COMPLETE CBC W/AUTO DIFF WBC: CPT

## 2020-10-11 PROCEDURE — 74011250636 HC RX REV CODE- 250/636: Performed by: FAMILY MEDICINE

## 2020-10-11 PROCEDURE — 65660000000 HC RM CCU STEPDOWN

## 2020-10-11 PROCEDURE — 74011250637 HC RX REV CODE- 250/637: Performed by: FAMILY MEDICINE

## 2020-10-11 RX ORDER — LORAZEPAM 2 MG/ML
0.5 INJECTION INTRAMUSCULAR
Status: DISCONTINUED | OUTPATIENT
Start: 2020-10-11 | End: 2020-10-12

## 2020-10-11 RX ORDER — ONDANSETRON 2 MG/ML
4 INJECTION INTRAMUSCULAR; INTRAVENOUS
Status: DISCONTINUED | OUTPATIENT
Start: 2020-10-11 | End: 2020-10-12 | Stop reason: HOSPADM

## 2020-10-11 RX ORDER — OXYCODONE HYDROCHLORIDE 5 MG/1
5 TABLET ORAL
Status: DISCONTINUED | OUTPATIENT
Start: 2020-10-11 | End: 2020-10-12

## 2020-10-11 RX ORDER — ACETAMINOPHEN 325 MG/1
650 TABLET ORAL
Status: DISCONTINUED | OUTPATIENT
Start: 2020-10-11 | End: 2020-10-12 | Stop reason: HOSPADM

## 2020-10-11 RX ORDER — LORAZEPAM 2 MG/ML
1 INJECTION INTRAMUSCULAR
Status: DISCONTINUED | OUTPATIENT
Start: 2020-10-11 | End: 2020-10-11

## 2020-10-11 RX ADMIN — MORPHINE SULFATE 2 MG: 2 INJECTION, SOLUTION INTRAMUSCULAR; INTRAVENOUS at 08:21

## 2020-10-11 RX ADMIN — MORPHINE SULFATE 2 MG: 2 INJECTION, SOLUTION INTRAMUSCULAR; INTRAVENOUS at 04:00

## 2020-10-11 RX ADMIN — MORPHINE SULFATE 2 MG: 2 INJECTION, SOLUTION INTRAMUSCULAR; INTRAVENOUS at 20:20

## 2020-10-11 RX ADMIN — MORPHINE SULFATE 2 MG: 2 INJECTION, SOLUTION INTRAMUSCULAR; INTRAVENOUS at 14:43

## 2020-10-11 RX ADMIN — MORPHINE SULFATE 2 MG: 2 INJECTION, SOLUTION INTRAMUSCULAR; INTRAVENOUS at 00:43

## 2020-10-11 RX ADMIN — MORPHINE SULFATE 2 MG: 2 INJECTION, SOLUTION INTRAMUSCULAR; INTRAVENOUS at 11:14

## 2020-10-11 RX ADMIN — SODIUM CHLORIDE 150 ML/HR: 9 INJECTION, SOLUTION INTRAVENOUS at 05:00

## 2020-10-11 RX ADMIN — FAMOTIDINE 20 MG: 20 TABLET ORAL at 17:48

## 2020-10-11 RX ADMIN — Medication 5 ML: at 22:00

## 2020-10-11 RX ADMIN — FAMOTIDINE 20 MG: 20 TABLET ORAL at 08:27

## 2020-10-11 RX ADMIN — LORAZEPAM 0.5 MG: 2 INJECTION INTRAMUSCULAR; INTRAVENOUS at 08:21

## 2020-10-11 RX ADMIN — ENOXAPARIN SODIUM 40 MG: 40 INJECTION SUBCUTANEOUS at 20:20

## 2020-10-11 NOTE — PROGRESS NOTES
Pt resting in bed, call light within reach, bed low and locked, and side rails up x3. Shift assessment complete. Pt has +2 pedal and radial pulses. Pain controlled at this time and needs have been met.

## 2020-10-11 NOTE — PROGRESS NOTES
Shift assessment complete. Patient resting in bed quietly. Patient is alert and oriented x4, bowel sounds present in all 4 quadrants, pedal and radial pulses present and palpable bilaterally, s1 and s2 heard on auscultation, lung sounds clear bilaterally,respirations even and unlabored, IV site clean, dry, intact with normal saline running at 150 mL/hr. Pt. able to dorsi and plantar flex extremities and sensation is present bilaterally. All other needs met at this time. Safety measures in place. Bed low and locked, side rails up x3, gripper socks on, call light, side table, and personal belongings within reach. Instructed patient to call for bathroom assistance and when getting out of bed.

## 2020-10-11 NOTE — PROGRESS NOTES
Care Management Interventions  PCP Verified by CM: Yes(Provided list of PCP)  Transition of Care Consult (CM Consult): [address]: Yes  Current Support Network: Lives with Spouse  The Plan for Transition of Care is Related to the Following Treatment Goals :  Increase independence  The Patient and/or Patient Representative was Provided with a Choice of Provider and Agrees with the Discharge Plan?: Yes  Freedom of Choice List was Provided with Basic Dialogue that Supports the Patient's Individualized Plan of Care/Goals, Treatment Preferences and Shares the Quality Data Associated with the Providers?: Yes  Discharge Location  Discharge Placement: Home with home health

## 2020-10-11 NOTE — CONSULTS
Consult    Patient: Mallorie Garcia MRN: [id number]  SSN: [id number]    YOB: 1950  Age: 69 y.o. Sex: female      Subjective:      Mallorie Garcia is a 69 y.o. female who is being seen for evaluation of a syncopal episode associated with some transient jerking but not associated with any period of post episode confusion which occurred after she had been assessed in the emergency room after a fall and sustaining a humeral fracture. The patient informs me that she spent a considerable amount of time in the emergency room. She did not have any fluids. She had not eaten in some time  She began to feel faint lightheaded and dizzy while she was being accompanied out to the parking lot.     Past Medical History:   Diagnosis Date    Arthritis     osteo    Chronic pain     r/t arthritis in hip and knees    DDD (degenerative disc disease)     \"back\"    GERD (gastroesophageal reflux disease)     controlled w/med    Psychiatric disorder     PTSD, severe anxiety with medical procedures    Thyroid disease     history of hypo in past. No current issues, no meds     Past Surgical History:   Procedure Laterality Date    HX HIP REPLACEMENT Right 04/2014      Family History   Problem Relation Age of Onset    Diabetes Mother     Arthritis-osteo Mother     Cancer Mother     Arthritis-osteo Father     Lung Disease Father      Social History     Tobacco Use    Smoking status: Never Smoker    Smokeless tobacco: Never Used   Substance Use Topics    Alcohol use: Yes     Comment: occasionally      Current Facility-Administered Medications   Medication Dose Route Frequency Provider Last Rate Last Dose    LORazepam (ATIVAN) injection 0.5 mg  0.5 mg IntraVENous Q6H PRN José Luis Verma DO   0.5 mg at 10/11/20 1292    diphenhydrAMINE (BENADRYL) capsule 25 mg  25 mg Oral QHS PRN Hodan Manrique MD        famotidine (PEPCID) tablet 20 mg  20 mg Oral BID Isa BELL MD   20 mg at 10/11/20 9767    sodium chloride (NS) flush 5-40 mL  5-40 mL IntraVENous Q8H Philomena BELL MD   5 mL at 10/10/20 2052    sodium chloride (NS) flush 5-40 mL  5-40 mL IntraVENous PRN Marya Cobb MD        0.9% sodium chloride infusion  150 mL/hr IntraVENous CONTINUOUS Philomena BELL  mL/hr at 10/11/20 0500 150 mL/hr at 10/11/20 0500    morphine injection 2 mg  2 mg IntraVENous Q3H PRN Marya Cobb MD   2 mg at 10/11/20 0541    enoxaparin (LOVENOX) injection 40 mg  40 mg SubCUTAneous Q24H Philomena BELL MD   40 mg at 10/10/20 2049        Allergies   Allergen Reactions    Oxycodone Nausea and Vomiting and Other (comments)     \" incoherent \"     Sulfa (Sulfonamide Antibiotics) Rash       Review of Systems:  Review of systems  ENT no sinus congestion no epistaxis no unilateral hearing loss no swallowing difficulty  Cardiopulmonarydenies shortness of breath, no orthopnea, no wheezing, no productive sputum no hemoptysis  GI weight appetite steady no constipation diarrhea abdominal pain  Joints no inflammation no hip pain or shoulder pain no inflammation. No new onset back pain  Skin no rash or ecchymosis  Neuro no syncope vertigo diplopia dysarthria dysphagia difficulty with balance or coordination unilateral weakness        Objective:     Vitals:    10/10/20 2332 10/11/20 0000 10/11/20 0356 10/11/20 0701   BP: (!) 182/78  (!) 171/72 (!) 167/74   Pulse: 76 72 85 78   Resp: 16  18 18   Temp: 98.3 °F (36.8 °C)  98.8 °F (37.1 °C) 98.6 °F (37 °C)   SpO2: 99%  97% 97%   Weight:       Height:            Physical Exam:  Alert cooperative female. No speech disturbance. Only examination unremarkable inspection of the tongue normal no evidence of tongue biting  Cranial nerves II through XII reveal no abnormalities she has no extraocular muscle paralysis and no nystagmus.   The face moves symmetrically and equally  Motor examination concentrated with reference to the left upper extremity reveals no radial nerve deficit    Assessment:   Syncopal episode on the basis of dehydration. MRI normal  She requires no additional work-up from the standpoint of seizures  Hospital Problems  Never Reviewed          Codes Class Noted POA    Leukocytosis ICD-10-CM: Q22.858  ICD-9-CM: 288.60  10/10/2020 Yes        Closed fracture of left proximal humerus ICD-10-CM: S42.202A  ICD-9-CM: 812.00  10/10/2020 Yes        * (Principal) Seizure ([address] ICD-10-CM: R56.9  ICD-9-CM: 780.39  10/10/2020 Yes              Plan:     By clinical description of the episode I do not believe that she  requires any further neurologic investigation.   Specifically I would have no qualms in her having any orthopedic procedure that might be deemed necessary and do not believe she represents a risk from an anesthetic standpoint general or local from a neurological standpoint    Signed By: Ade Perez MD     October 11, 2020

## 2020-10-11 NOTE — CONSULTS
[facility] Consultation Note    Patient ID:  Name: Sujata Mayes  MRN: [id number]  AGE: 69 y.o.  : 1950    Date of Consultation:  2020  Referring Physician:  Dr. Constance Brown: 70 y/o complains of left shoulder pain. She fell yesterday and presented to the ER with a left proximal humerus fracture. She was discharged from the ER and experienced seizure vs syncopal episode when leaving the hospital.  She was admitted for further neuro evaluation.      Past Medical History Includes:   Past Medical History:   Diagnosis Date    Arthritis     osteo    Chronic pain     r/t arthritis in hip and knees    DDD (degenerative disc disease)     \"back\"    GERD (gastroesophageal reflux disease)     controlled w/med    Psychiatric disorder     PTSD, severe anxiety with medical procedures    Thyroid disease     history of hypo in past. No current issues, no meds   ,   Past Surgical History:   Procedure Laterality Date    HX HIP REPLACEMENT Right 2014     Family History:   Family History   Problem Relation Age of Onset    Diabetes Mother     Arthritis-osteo Mother     Cancer Mother     Arthritis-osteo Father     Lung Disease Father       Social History:   Social History     Tobacco Use    Smoking status: Never Smoker    Smokeless tobacco: Never Used   Substance Use Topics    Alcohol use: Yes     Comment: occasionally       ALLERGIES:   Allergies   Allergen Reactions    Oxycodone Nausea and Vomiting and Other (comments)     \" incoherent \"     Sulfa (Sulfonamide Antibiotics) Rash        Patient Medications    Current Facility-Administered Medications   Medication Dose Route Frequency    LORazepam (ATIVAN) injection 0.5 mg  0.5 mg IntraVENous Q6H PRN    diphenhydrAMINE (BENADRYL) capsule 25 mg  25 mg Oral QHS PRN    famotidine (PEPCID) tablet 20 mg  20 mg Oral BID    sodium chloride (NS) flush 5-40 mL  5-40 mL IntraVENous Q8H    sodium chloride (NS) flush 5-40 mL  5-40 mL IntraVENous PRN    0.9% sodium chloride infusion  150 mL/hr IntraVENous CONTINUOUS    morphine injection 2 mg  2 mg IntraVENous Q3H PRN    enoxaparin (LOVENOX) injection 40 mg  40 mg SubCUTAneous Q24H         Review of Systems:  Pertinent items are noted in HPI. Physical Exam:      General: NAD, Alert, Oriented x 3   Mental Status: Appropriate   Psych: Normal Affect, Normal Mood    HEENT: Normal Cephalic/Atraumatic, PERRL   Lungs: Respirations even and unlabored, Breath Sounds were clear, no respiratory distress   Heart: Regular Rate and Rhythm   Vascular: Distal pulses intact, good capillary refill   Skin: No redness, No Rashes, Skin is dry   Musculoskeletal: LUE tenderness with swelling. Exam limited due to fracture. Shoulder sling and splint in place. Lymphatic: No lympahdenopathy, No distal edema   Neuro: No gross deficits   Abdomen: Soft, Non tender, No distension      VITALS:   Patient Vitals for the past 8 hrs:   BP Temp Pulse Resp SpO2   10/11/20 0701 (!) 167/74 98.6 °F (37 °C) 78 18 97 %   10/11/20 0356 (!) 171/72 98.8 °F (37.1 °C) 85 18 97 %    , Temp (24hrs), Av.5 °F (36.9 °C), Min:97.9 °F (36.6 °C), Max:98.8 °F (37.1 °C)         X-ray: Displace left proximal humerus fracture    Diagnosis   Patient Active Problem List   Diagnosis Code    Status post hip replacement Z96.649    Osteoarthritis M19.90    S/P total knee arthroplasty Z96.659    Leukocytosis D72.829    Closed fracture of left proximal humerus S42.202A    Seizure ([facility] [address] R56.9    Humerus fracture S42.309A          Assessment and Plan:   Patient with left proximal humerus fracture. She will continue to wear sling and follow-up with one of our sport medicine partners for possible ORIF of the left shoulder. She has requested follow-up with Dr. Adele Hardy.         Narinder Boyle NP  10/11/2020,  10:58 AM

## 2020-10-11 NOTE — PROGRESS NOTES
10/10/20 2015   Skin Integumentary   Skin Integumentary (WDL) WDL    Pressure  Injury Documentation No Pressure Injury Noted-Pressure Ulcer Prevention Initiated   Skin Color Appropriate for ethnicity   Skin Condition/Temp Dry; Warm   Skin Integrity   (Scrape on left knee)   Turgor Non-tenting   Hair Growth Present   Nails WDL   Varicosities Absent

## 2020-10-11 NOTE — PROGRESS NOTES
Progress Note    Patient: Jose Luis Siddiqui MRN: [id number]  SSN: [id number]    YOB: 1950  Age: 69 y.o. Sex: female      Admit Date: 10/10/2020    LOS: 0 days     Subjective:   F/U humerus fracture, syncopal episode. \"75 y.o. female with a past medical history of GERD who presents to the ER after a fall at home from tripping on a mat. She landed on her L arm and had immediate severe pain in her L shoulder. She came to the ER and was diagnosed with proximal L humeral fracture and was was discharged. However, after waiting in the ER lobby to be picked up by her , she attempted to ambulate to her car, the patient reports that she felt very lightheaded and dizzy, and then lost consciousness. Per staff, she was unconscious for about 15 seconds and had some rhythmic shaking movements during this time. \" Neurology has seen and does not think seizure. Ortho has seen who plan for surgery later this week. CT head with no acute abnormality. MRI brain showed no acute process. Bp elevated. No chest pain or SOB. Having left arm pain.    Current Facility-Administered Medications   Medication Dose Route Frequency    LORazepam (ATIVAN) injection 0.5 mg  0.5 mg IntraVENous Q6H PRN    oxyCODONE IR (ROXICODONE) tablet 5 mg  5 mg Oral Q4H PRN    ondansetron (ZOFRAN) injection 4 mg  4 mg IntraVENous Q6H PRN    diphenhydrAMINE (BENADRYL) capsule 25 mg  25 mg Oral QHS PRN    famotidine (PEPCID) tablet 20 mg  20 mg Oral BID    sodium chloride (NS) flush 5-40 mL  5-40 mL IntraVENous Q8H    sodium chloride (NS) flush 5-40 mL  5-40 mL IntraVENous PRN    morphine injection 2 mg  2 mg IntraVENous Q3H PRN    enoxaparin (LOVENOX) injection 40 mg  40 mg SubCUTAneous Q24H       Objective:     Vitals:    10/11/20 0000 10/11/20 0356 10/11/20 0701 10/11/20 1042   BP:  (!) 171/72 (!) 167/74 (!) 172/74   Pulse: 72 85 78 75   Resp:  18 18 18   Temp:  98.8 °F (37.1 °C) 98.6 °F (37 °C) 98.4 °F (36.9 °C)   SpO2: 97% 97% 98%   Weight:       Height:             Intake and Output:  Current Shift: No intake/output data recorded. Last three shifts: No intake/output data recorded. Physical Exam:   General:  Alert, cooperative, no distress, appears stated age. Eyes:  Conjunctivae/corneas clear. Ears:  Normal TMs and external ear canals both ears. Nose: Nares normal. Septum midline. Mouth/Throat: Lips, mucosa, and tongue normal.    Neck: no JVD. Back:   deferred   Lungs:   Clear to auscultation bilaterally. Heart:  Regular rate and rhythm, S1, S2 normal   Abdomen:   Soft, non-tender. Bowel sounds normal. No masses,  No organomegaly. Extremities: Left arm in sing. Good sensation to left fingers. Pulses: 2+ and symmetric all extremities. Skin: Skin color, texture, turgor normal. No rashes or lesions   Lymph nodes: Cervical, supraclavicular, and axillary nodes normal.   Neurologic: CNII-XII intact.        Lab/Data Review:    Recent Results (from the past 24 hour(s))   EKG, 12 LEAD, INITIAL    Collection Time: 10/10/20  5:16 PM   Result Value Ref Range    Ventricular Rate 72 BPM    Atrial Rate 72 BPM    P-R Interval 154 ms    QRS Duration 94 ms    Q-T Interval 376 ms    QTC Calculation (Bezet) 411 ms    Calculated P Axis 47 degrees    Calculated R Axis 23 degrees    Calculated T Axis 87 degrees    Diagnosis       Normal sinus rhythm  Cannot rule out Anterior infarct , age undetermined  Nonspecific ST and T wave abnormality  Abnormal ECG  When compared with ECG of 16-SEP-2015 09:08,  Premature ventricular complexes are no longer Present  Confirmed by Mundo Garcia ([id number]) on 10/11/2020 10:18:48 AM     CBC WITH AUTOMATED DIFF    Collection Time: 10/10/20  5:47 PM   Result Value Ref Range    WBC 14.5 (H) 4.3 - 11.1 K/uL    RBC 5.10 4.05 - 5.2 M/uL    HGB 13.7 11.7 - 15.4 g/dL    HCT 42.6 35.8 - 46.3 %    MCV 83.5 79.6 - 97.8 FL    MCH 26.9 26.1 - 32.9 PG    MCHC 32.2 31.4 - 35.0 g/dL    RDW 12.4 11.9 - 14.6 % PLATELET 704 550 - 597 K/uL    MPV 10.8 9.4 - 12.3 FL    ABSOLUTE NRBC 0.00 0.0 - 0.2 K/uL    DF AUTOMATED      NEUTROPHILS 90 (H) 43 - 78 %    LYMPHOCYTES 6 (L) 13 - 44 %    MONOCYTES 3 (L) 4.0 - 12.0 %    EOSINOPHILS 0 (L) 0.5 - 7.8 %    BASOPHILS 0 0.0 - 2.0 %    IMMATURE GRANULOCYTES 0 0.0 - 5.0 %    ABS. NEUTROPHILS 13.1 (H) 1.7 - 8.2 K/UL    ABS. LYMPHOCYTES 0.9 0.5 - 4.6 K/UL    ABS. MONOCYTES 0.4 0.1 - 1.3 K/UL    ABS. EOSINOPHILS 0.0 0.0 - 0.8 K/UL    ABS. BASOPHILS 0.1 0.0 - 0.2 K/UL    ABS. IMM. GRANS. 0.1 0.0 - 0.5 K/UL   METABOLIC PANEL, COMPREHENSIVE    Collection Time: 10/10/20  5:47 PM   Result Value Ref Range    Sodium 142 136 - 145 mmol/L    Potassium 4.0 3.5 - 5.1 mmol/L    Chloride 108 (H) 98 - 107 mmol/L    CO2 23 21 - 32 mmol/L    Anion gap 11 7 - 16 mmol/L    Glucose 157 (H) 65 - 100 mg/dL    BUN 20 8 - 23 MG/DL    Creatinine 1.01 (H) 0.6 - 1.0 MG/DL    GFR est AA >60 >60 ml/min/1.73m2    GFR est non-AA 57 (L) >60 ml/min/1.73m2    Calcium 9.3 8.3 - 10.4 MG/DL    Bilirubin, total 0.4 0.2 - 1.1 MG/DL    ALT (SGPT) 18 12 - 65 U/L    AST (SGOT) 21 15 - 37 U/L    Alk.  phosphatase 79 50 - 130 U/L    Protein, total 6.9 6.3 - 8.2 g/dL    Albumin 3.6 3.2 - 4.6 g/dL    Globulin 3.3 2.3 - 3.5 g/dL    A-G Ratio 1.1 (L) 1.2 - 3.5     MAGNESIUM    Collection Time: 10/10/20  5:47 PM   Result Value Ref Range    Magnesium 1.9 1.8 - 2.4 mg/dL   TROPONIN-HIGH SENSITIVITY    Collection Time: 10/10/20  5:47 PM   Result Value Ref Range    Troponin-High Sensitivity 9.3 0 - 14 pg/mL   ETHYL ALCOHOL    Collection Time: 10/10/20  9:01 PM   Result Value Ref Range    ALCOHOL(ETHYL),SERUM <3 MG/DL   URINALYSIS W/ RFLX MICROSCOPIC    Collection Time: 10/10/20  9:54 PM   Result Value Ref Range    Color YELLOW      Appearance CLEAR      Specific gravity 1.036 (H) 1.001 - 1.023      pH (UA) 5.5 5.0 - 9.0      Protein TRACE (A) NEG mg/dL    Glucose Negative mg/dL    Ketone Negative NEG mg/dL    Bilirubin SMALL (A) NEG Blood Negative NEG      Urobilinogen 0.2 0.2 - 1.0 EU/dL    Nitrites Negative NEG      Leukocyte Esterase Negative NEG      WBC 3-5 0 /hpf    RBC 3-5 0 /hpf    Epithelial cells 3-5 0 /hpf    Bacteria 0 0 /hpf    Casts 0-3 0 /lpf   METABOLIC PANEL, BASIC    Collection Time: 10/11/20  3:18 AM   Result Value Ref Range    Sodium 141 136 - 145 mmol/L    Potassium 3.8 3.5 - 5.1 mmol/L    Chloride 110 (H) 98 - 107 mmol/L    CO2 23 21 - 32 mmol/L    Anion gap 8 7 - 16 mmol/L    Glucose 148 (H) 65 - 100 mg/dL    BUN 25 (H) 8 - 23 MG/DL    Creatinine 0.90 0.6 - 1.0 MG/DL    GFR est AA >60 >60 ml/min/1.73m2    GFR est non-AA >60 >60 ml/min/1.73m2    Calcium 8.3 8.3 - 10.4 MG/DL   MAGNESIUM    Collection Time: 10/11/20  3:18 AM   Result Value Ref Range    Magnesium 1.9 1.8 - 2.4 mg/dL   CBC WITH AUTOMATED DIFF    Collection Time: 10/11/20  3:18 AM   Result Value Ref Range    WBC 11.2 (H) 4.3 - 11.1 K/uL    RBC 4.53 4.05 - 5.2 M/uL    HGB 12.1 11.7 - 15.4 g/dL    HCT 37.1 35.8 - 46.3 %    MCV 81.9 79.6 - 97.8 FL    MCH 26.7 26.1 - 32.9 PG    MCHC 32.6 31.4 - 35.0 g/dL    RDW 12.8 11.9 - 14.6 %    PLATELET 399 471 - 221 K/uL    MPV 10.9 9.4 - 12.3 FL    ABSOLUTE NRBC 0.00 0.0 - 0.2 K/uL    DF AUTOMATED      NEUTROPHILS 73 43 - 78 %    LYMPHOCYTES 17 13 - 44 %    MONOCYTES 9 4.0 - 12.0 %    EOSINOPHILS 0 (L) 0.5 - 7.8 %    BASOPHILS 0 0.0 - 2.0 %    IMMATURE GRANULOCYTES 0 0.0 - 5.0 %    ABS. NEUTROPHILS 8.2 1.7 - 8.2 K/UL    ABS. LYMPHOCYTES 1.9 0.5 - 4.6 K/UL    ABS. MONOCYTES 1.0 0.1 - 1.3 K/UL    ABS. EOSINOPHILS 0.0 0.0 - 0.8 K/UL    ABS. BASOPHILS 0.0 0.0 - 0.2 K/UL    ABS. IMM. GRANS. 0.0 0.0 - 0.5 K/UL       Assessment/ Plan:     Principal Problem:    Closed fracture of left proximal humerus (10/10/2020)    Active Problems:    Leukocytosis (10/10/2020)      Humerus fracture (10/11/2020)      Syncope (10/11/2020)    Left humeral fx - Ortho plans for surgery later this week. PRN morphine.  She states she gets severely nauseated with any pain medications orally. Have spoken to give zofran with oral pain medications to see if can tolerate. Syncope - Likely from pain. No seizures. Leukocytosis likely from stress demargination. Trending down and neutrophil count 0. Do not think infection. DVT prophylaxis - Lovenox.  Unsure when ortho surgery to be done but will need to hold 24 hours before surgery  Signed By: Rhona Waldrop DO     October 11, 2020

## 2020-10-11 NOTE — PROGRESS NOTES
TRANSFER - IN REPORT:    Verbal report received from Lupillo Vazquez RN(name) on [employer]  being received from ED(unit) for routine progression of care      Report consisted of patients Situation, Background, Assessment and   Recommendations(SBAR). Information from the following report(s) SBAR, Kardex, ED Summary and MAR was reviewed with the receiving nurse. Opportunity for questions and clarification was provided. Assessment completed upon patients arrival to unit and care assumed.

## 2020-10-12 ENCOUNTER — HOSPITAL ENCOUNTER (INPATIENT)
Age: 70
LOS: 2 days | Discharge: HOME HEALTH | DRG: 494 | End: 2020-10-14
Attending: FAMILY MEDICINE | Admitting: FAMILY MEDICINE
Payer: MEDICARE

## 2020-10-12 VITALS
OXYGEN SATURATION: 98 % | RESPIRATION RATE: 16 BRPM | HEIGHT: 65 IN | BODY MASS INDEX: 38.32 KG/M2 | SYSTOLIC BLOOD PRESSURE: 159 MMHG | DIASTOLIC BLOOD PRESSURE: 84 MMHG | TEMPERATURE: 98.3 F | WEIGHT: 230 LBS | HEART RATE: 90 BPM

## 2020-10-12 DIAGNOSIS — S42.292A OTHER CLOSED DISPLACED FRACTURE OF PROXIMAL END OF LEFT HUMERUS, INITIAL ENCOUNTER: ICD-10-CM

## 2020-10-12 PROCEDURE — 77030027138 HC INCENT SPIROMETER -A

## 2020-10-12 PROCEDURE — 2709999900 HC NON-CHARGEABLE SUPPLY

## 2020-10-12 PROCEDURE — 74011250637 HC RX REV CODE- 250/637: Performed by: FAMILY MEDICINE

## 2020-10-12 PROCEDURE — 74011250636 HC RX REV CODE- 250/636: Performed by: FAMILY MEDICINE

## 2020-10-12 PROCEDURE — 65270000029 HC RM PRIVATE

## 2020-10-12 RX ORDER — MELOXICAM 15 MG/1
7.5 TABLET ORAL 2 TIMES DAILY
COMMUNITY
End: 2020-10-14

## 2020-10-12 RX ORDER — HYDRALAZINE HYDROCHLORIDE 10 MG/1
10 TABLET, FILM COATED ORAL
Status: DISCONTINUED | OUTPATIENT
Start: 2020-10-12 | End: 2020-10-14 | Stop reason: HOSPADM

## 2020-10-12 RX ORDER — FAMOTIDINE 20 MG/1
20 TABLET, FILM COATED ORAL 2 TIMES DAILY
Status: DISCONTINUED | OUTPATIENT
Start: 2020-10-12 | End: 2020-10-14 | Stop reason: HOSPADM

## 2020-10-12 RX ORDER — MORPHINE SULFATE 2 MG/ML
2 INJECTION, SOLUTION INTRAMUSCULAR; INTRAVENOUS
Status: CANCELLED | OUTPATIENT
Start: 2020-10-12

## 2020-10-12 RX ORDER — ENOXAPARIN SODIUM 100 MG/ML
40 INJECTION SUBCUTANEOUS EVERY 24 HOURS
Qty: 1 ML | Refills: 0 | Status: SHIPPED
Start: 2020-10-12 | End: 2020-10-14

## 2020-10-12 RX ORDER — FAMOTIDINE 20 MG/1
20 TABLET, FILM COATED ORAL 2 TIMES DAILY
Status: CANCELLED | OUTPATIENT
Start: 2020-10-12

## 2020-10-12 RX ORDER — ENOXAPARIN SODIUM 100 MG/ML
40 INJECTION SUBCUTANEOUS EVERY 24 HOURS
Status: DISCONTINUED | OUTPATIENT
Start: 2020-10-12 | End: 2020-10-13

## 2020-10-12 RX ORDER — ENOXAPARIN SODIUM 100 MG/ML
40 INJECTION SUBCUTANEOUS EVERY 24 HOURS
Status: CANCELLED | OUTPATIENT
Start: 2020-10-12

## 2020-10-12 RX ORDER — ONDANSETRON 2 MG/ML
4 INJECTION INTRAMUSCULAR; INTRAVENOUS
Qty: 1 ML | Refills: 0 | Status: SHIPPED
Start: 2020-10-12

## 2020-10-12 RX ORDER — HYDRALAZINE HYDROCHLORIDE 10 MG/1
10 TABLET, FILM COATED ORAL ONCE
Status: COMPLETED | OUTPATIENT
Start: 2020-10-12 | End: 2020-10-12

## 2020-10-12 RX ORDER — MORPHINE SULFATE 2 MG/ML
2 INJECTION, SOLUTION INTRAMUSCULAR; INTRAVENOUS
Status: DISCONTINUED | OUTPATIENT
Start: 2020-10-12 | End: 2020-10-13

## 2020-10-12 RX ORDER — HYDRALAZINE HYDROCHLORIDE 10 MG/1
10 TABLET, FILM COATED ORAL
Status: CANCELLED | OUTPATIENT
Start: 2020-10-12

## 2020-10-12 RX ORDER — MORPHINE SULFATE 2 MG/ML
2 INJECTION, SOLUTION INTRAMUSCULAR; INTRAVENOUS
Qty: 1 ML | Refills: 0 | Status: SHIPPED
Start: 2020-10-12 | End: 2020-10-14

## 2020-10-12 RX ORDER — TIZANIDINE HYDROCHLORIDE 6 MG/1
5 CAPSULE, GELATIN COATED ORAL EVERY 6 HOURS
COMMUNITY

## 2020-10-12 RX ORDER — ACETAMINOPHEN 325 MG/1
650 TABLET ORAL
Status: CANCELLED | OUTPATIENT
Start: 2020-10-12

## 2020-10-12 RX ORDER — HYDRALAZINE HYDROCHLORIDE 10 MG/1
10 TABLET, FILM COATED ORAL
Status: DISCONTINUED | OUTPATIENT
Start: 2020-10-12 | End: 2020-10-12 | Stop reason: HOSPADM

## 2020-10-12 RX ORDER — ONDANSETRON 2 MG/ML
4 INJECTION INTRAMUSCULAR; INTRAVENOUS
Status: DISCONTINUED | OUTPATIENT
Start: 2020-10-12 | End: 2020-10-14 | Stop reason: HOSPADM

## 2020-10-12 RX ORDER — ACETAMINOPHEN 325 MG/1
650 TABLET ORAL
Status: DISCONTINUED | OUTPATIENT
Start: 2020-10-12 | End: 2020-10-14 | Stop reason: HOSPADM

## 2020-10-12 RX ORDER — DIPHENHYDRAMINE HCL 25 MG
25 CAPSULE ORAL
Status: CANCELLED | OUTPATIENT
Start: 2020-10-12

## 2020-10-12 RX ORDER — HYDRALAZINE HYDROCHLORIDE 10 MG/1
10 TABLET, FILM COATED ORAL
Qty: 1 TAB | Refills: 0 | Status: SHIPPED
Start: 2020-10-12

## 2020-10-12 RX ORDER — LORAZEPAM 0.5 MG/1
0.5 TABLET ORAL ONCE
Status: COMPLETED | OUTPATIENT
Start: 2020-10-12 | End: 2020-10-12

## 2020-10-12 RX ORDER — ONDANSETRON 2 MG/ML
4 INJECTION INTRAMUSCULAR; INTRAVENOUS
Status: CANCELLED | OUTPATIENT
Start: 2020-10-12

## 2020-10-12 RX ORDER — DIPHENHYDRAMINE HCL 25 MG
25 CAPSULE ORAL
Status: DISCONTINUED | OUTPATIENT
Start: 2020-10-12 | End: 2020-10-14 | Stop reason: HOSPADM

## 2020-10-12 RX ADMIN — LORAZEPAM 0.5 MG: 0.5 TABLET ORAL at 11:48

## 2020-10-12 RX ADMIN — FAMOTIDINE 20 MG: 20 TABLET ORAL at 17:34

## 2020-10-12 RX ADMIN — HYDRALAZINE HYDROCHLORIDE 10 MG: 10 TABLET, FILM COATED ORAL at 05:30

## 2020-10-12 RX ADMIN — Medication 10 ML: at 11:53

## 2020-10-12 RX ADMIN — ONDANSETRON 4 MG: 2 INJECTION INTRAMUSCULAR; INTRAVENOUS at 11:49

## 2020-10-12 RX ADMIN — HYDRALAZINE HYDROCHLORIDE 10 MG: 10 TABLET, FILM COATED ORAL at 17:34

## 2020-10-12 RX ADMIN — ENOXAPARIN SODIUM 40 MG: 40 INJECTION SUBCUTANEOUS at 20:41

## 2020-10-12 RX ADMIN — MORPHINE SULFATE 2 MG: 2 INJECTION, SOLUTION INTRAMUSCULAR; INTRAVENOUS at 05:35

## 2020-10-12 RX ADMIN — MORPHINE SULFATE 2 MG: 2 INJECTION, SOLUTION INTRAMUSCULAR; INTRAVENOUS at 14:04

## 2020-10-12 RX ADMIN — TAPENTADOL HYDROCHLORIDE 50 MG: 50 TABLET, FILM COATED ORAL at 22:52

## 2020-10-12 RX ADMIN — FAMOTIDINE 20 MG: 20 TABLET ORAL at 08:42

## 2020-10-12 RX ADMIN — TAPENTADOL HYDROCHLORIDE 50 MG: 50 TABLET, FILM COATED ORAL at 17:34

## 2020-10-12 RX ADMIN — MORPHINE SULFATE 2 MG: 2 INJECTION, SOLUTION INTRAMUSCULAR; INTRAVENOUS at 02:10

## 2020-10-12 RX ADMIN — TAPENTADOL HYDROCHLORIDE 50 MG: 50 TABLET, FILM COATED ORAL at 08:42

## 2020-10-12 NOTE — PROGRESS NOTES
Problem: Falls - Risk of  Goal: *Absence of Falls  Description: Document Ly Ozuna Fall Risk and appropriate interventions in the flowsheet.   Outcome: Progressing Towards Goal  Note: Fall Risk Interventions:  Mobility Interventions: Bed/chair exit alarm, Communicate number of staff needed for ambulation/transfer, Patient to call before getting OOB         Medication Interventions: Bed/chair exit alarm, Patient to call before getting OOB, Teach patient to arise slowly    Elimination Interventions: Bed/chair exit alarm, Call light in reach, Patient to call for help with toileting needs, Stay With Me (per policy), Toilet paper/wipes in reach, Toileting schedule/hourly rounds              Problem: Patient Education: Go to Patient Education Activity  Goal: Patient/Family Education  Outcome: Progressing Towards Goal

## 2020-10-12 NOTE — PROGRESS NOTES
TRANSFER - OUT REPORT:    Verbal report given to Nghia Rivera on Lucinda Leal  being transferred to [facility] for routine progression of care       Report consisted of patients Situation, Background, Assessment and   Recommendations(SBAR). Information from the following report(s) SBAR was reviewed with the receiving nurse. Lines:   Peripheral IV 10/10/20 Right Hand (Active)   Site Assessment Clean, dry, & intact 10/11/20 2015   Phlebitis Assessment 0 10/11/20 2015   Infiltration Assessment 0 10/11/20 2015   Dressing Status Clean, dry, & intact 10/11/20 2015   Dressing Type Transparent;Tape 10/11/20 2015   Hub Color/Line Status Capped 10/11/20 2015        Opportunity for questions and clarification was provided.       Patient transported with:

## 2020-10-12 NOTE — PROGRESS NOTES
OT/PT note: orders received. Patient getting up with nursing. She is being transferred  [facility] for surgery. Will discharge orders.  Thank you for this referral. Ayana Triplett OTR/L

## 2020-10-12 NOTE — PROGRESS NOTES
Consult Received:     Displaced proximal humerus fracture . I have notified Dr. Fariba Dangelo. We will plan for surgery on Wednesday. NPO after midnight Wednesday morning.

## 2020-10-12 NOTE — PROGRESS NOTES
10/12/20 133   Dual Skin Pressure Injury Assessment   Dual Skin Pressure Injury Assessment WDL   Second Care Provider (Based on [address] [place] [address]) FREDI Kearney   Skin Integumentary   Skin Integumentary (WDL) WDL    Pressure  Injury Documentation No Pressure Injury Noted-Pressure Ulcer Prevention Initiated   Skin Color Appropriate for ethnicity   Skin Condition/Temp Warm;Dry   Skin Integrity Intact   Turgor Non-tenting   Hair Growth Present   Varicosities Absent   Wound Prevention and Protection Methods   Orientation of Wound Prevention Posterior   Location of Wound Prevention Sacrum/Coccyx   Dressing Present  No   Wound Offloading (Prevention Methods) Bed, pressure reduction mattress;Pillows;Repositioning;Turning

## 2020-10-12 NOTE — PROGRESS NOTES
Progress Note    Patient: Jose Luis Siddiqui MRN: [id number]  SSN: [id number]    YOB: 1950  Age: 69 y.o. Sex: female      Admit Date: 10/10/2020    LOS: 1 day     Subjective:   F/U humerus fracture, syncopal episode. \"75 y.o. female with a past medical history of GERD who presents to the ER after a fall at home from tripping on a mat. She landed on her L arm and had immediate severe pain in her L shoulder. She came to the ER and was diagnosed with proximal L humeral fracture and was was discharged. However, after waiting in the ER lobby to be picked up by her , she attempted to ambulate to her car, the patient reports that she felt very lightheaded and dizzy, and then lost consciousness. Per staff, she was unconscious for about 15 seconds and had some rhythmic shaking movements during this time. \" Neurology has seen and does not think had seizure. Ortho has seen who recommended outpatient surgical repair. CT head with no acute abnormality. MRI brain showed no acute process. No further syncope or seizure like activity. Pain uncontrolled. BP elevated due to pain. She also states she has white coat syndrome. Bp elevated. No chest pain or SOB. Having left arm pain.    Current Facility-Administered Medications   Medication Dose Route Frequency    tapentadoL (NUCYNTA) tablet 50 mg  50 mg Oral Q6H PRN    ondansetron (ZOFRAN) injection 4 mg  4 mg IntraVENous Q6H PRN    acetaminophen (TYLENOL) tablet 650 mg  650 mg Oral Q6H PRN    influenza vaccine 2020-21 (6 mos+)(PF) (FLUARIX/FLULAVAL/FLUZONE QUAD) injection 0.5 mL  0.5 mL IntraMUSCular PRIOR TO DISCHARGE    diphenhydrAMINE (BENADRYL) capsule 25 mg  25 mg Oral QHS PRN    famotidine (PEPCID) tablet 20 mg  20 mg Oral BID    sodium chloride (NS) flush 5-40 mL  5-40 mL IntraVENous Q8H    sodium chloride (NS) flush 5-40 mL  5-40 mL IntraVENous PRN    morphine injection 2 mg  2 mg IntraVENous Q3H PRN    enoxaparin (LOVENOX) injection 40 mg  40 mg SubCUTAneous Q24H       Objective:     Vitals:    10/12/20 0000 10/12/20 0403 10/12/20 0404 10/12/20 0803   BP: (!) 186/66 (!) 193/78 (!) 195/76 (!) 173/91   Pulse: 78  86 75   Resp: 16  14 16   Temp: 98.2 °F (36.8 °C)  98.2 °F (36.8 °C) 98.3 °F (36.8 °C)   SpO2: 98%  98% 98%   Weight:       Height:             Intake and Output:  Current Shift: No intake/output data recorded. Last three shifts: No intake/output data recorded. Physical Exam:   General:  Alert, cooperative, no distress, appears stated age. Eyes:  Conjunctivae/corneas clear. Ears:  Normal TMs and external ear canals both ears. Nose: Nares normal. Septum midline. Mouth/Throat: Lips, mucosa, and tongue normal.    Neck: no JVD. Back:   deferred   Lungs:   Clear to auscultation bilaterally. Heart:  Regular rate and rhythm, S1, S2 normal   Abdomen:   Soft, non-tender. Bowel sounds normal. No masses,  No organomegaly. Obese. Extremities: Left arm in sing. Good sensation to left fingers. Pulses: 2+ and symmetric all extremities. Skin: Skin color, texture, turgor normal. No rashes or lesions   Lymph nodes: Cervical, supraclavicular, and axillary nodes normal.   Neurologic: CNII-XII intact. Lab/Data Review:    No results found for this or any previous visit (from the past 24 hour(s)). Assessment/ Plan:     Principal Problem:    Closed fracture of left proximal humerus (10/10/2020)    Active Problems:    Leukocytosis (10/10/2020)      Humerus fracture (10/11/2020)      Syncope (10/11/2020)    Left humeral fx - Ortho plans for surgery as outpatient but her pain is uncontrolled. She would rather have surgery while inpatient. I have left a message to the on call orthopedic surgery team to see if they would be willing to do surgery. She has no preference to a particular surgeon. PRN morphine. Added nucynta since she states this is her only pain medication orally that she has tolerated before.      Syncope - Likely from pain. No seizures. Elevated BP - States has been diagnosed with white coat syndrome. Reviewed some outpatient BP readings and have been consistent with our readings as well. Pain uncontrolled so thinking this may be a component as well. PRN hydralazine. Consult PT/OT. Unsure dc disposition until sure if can/cannot have surgery    DVT prophylaxis - Lovenox.    Signed By: Aleks Sender, DO     October 12, 2020

## 2020-10-12 NOTE — DISCHARGE SUMMARY
Hospitalist Discharge Summary     Patient ID:  Jaret Hernandez  [id number]  55 y.o.  1950  Admit date: 10/10/2020  2:25 PM  Discharge date and time: 10/12/2020  Attending: Cyril San DO  PCP:  None  Treatment Team: Attending Provider: Cyril San DO; Consulting Provider: Stephon Dumont; Care Manager: Jordan Dowell LMSW; Utilization Review: Brian Moser RN; Staff Nurse: Lena Dasilva; Charge Nurse: Ignacio Welsh RN; Occupational Therapist: Silvia Erwin OT    Principal Diagnosis Closed fracture of left proximal humerus   Principal Problem:    Closed fracture of left proximal humerus (10/10/2020)    Active Problems:    Leukocytosis (10/10/2020)      Humerus fracture (10/11/2020)      Syncope (10/11/2020)     Hospital Course: \"70 y.o. female with a past medical history of GERD who presents to the ER after a fall at home from tripping on a mat. She landed on her L arm and had immediate severe pain in her L shoulder. She came to the ER and was diagnosed with proximal L humeral fracture and was was discharged. However, after waiting in the ER lobby to be picked up by her , she attempted to ambulate to her car, the patient reports that she felt very lightheaded and dizzy, and then lost consciousness. Per staff, she was unconscious for about 15 seconds and had some rhythmic shaking movements during this time. \" Neurology has seen and does not think had seizure. Ortho has seen who recommended outpatient surgical repair. CT head with no acute abnormality. MRI brain showed no acute process.      No further syncope or seizure like activity. Pain uncontrolled. BP elevated due to pain. She also states she has white coat syndrome. Reviewed some outpatient BP readings and have been consistent with our readings as well. Pain uncontrolled so thinking this may be a component. PRN hydralazine.      Ortho originally planned for surgery as outpatient but her pain is uncontrolled. She would rather have surgery while inpatient. [facility] to transfer to [facility] for potential surgery. Denies taking Valium at home. Accepting provider - Dr. Rut Whittington.     Please refer to the admission H&P for details of presentation. In [facility] Diagnostic Studies:       Labs: Results:       Chemistry Recent Labs     10/11/20  0318 10/10/20  1747   * 157*    142   K 3.8 4.0   * 108*   CO2 23 23   BUN 25* 20   CREA 0.90 1.01*   CA 8.3 9.3   AGAP 8 11   AP  --  79   TP  --  6.9   ALB  --  3.6   GLOB  --  3.3   AGRAT  --  1.1*      CBC w/Diff Recent Labs     10/11/20  0318 10/10/20  1747   WBC 11.2* 14.5*   RBC 4.53 5.10   HGB 12.1 13.7   HCT 37.1 42.6    246   GRANS 73 90*   LYMPH 17 6*   EOS 0* 0*      Cardiac Enzymes No results for input(s): CPK, CKND1, AGATA in the last 72 hours. No lab exists for component: CKRMB, TROIP   Coagulation No results for input(s): PTP, INR, APTT, INREXT, INREXT in the last 72 hours. Lipid Panel No results found for: CHOL, CHOLPOCT, CHOLX, CHLST, CHOLV, 212050, HDL, HDLP, LDL, LDLC, DLDLP, 546676, VLDLC, VLDL, TGLX, TRIGL, TRIGP, TGLPOCT, CHHD, CHHDX   BNP No results for input(s): BNPP in the last 72 hours. Liver Enzymes Recent Labs     10/10/20  1747   TP 6.9   ALB 3.6   AP 79      Thyroid Studies No results found for: T4, T3U, TSH, TSHEXT, TSHEXT         Discharge Exam:  Visit Vitals  BP (!) 173/91 (BP 1 Location: Right arm, BP Patient Position: At rest)   Pulse 75   Temp 98.3 °F (36.8 °C)   Resp 16   Ht 5' 5\" (1.651 m)   Wt 104.3 kg (230 lb)   SpO2 98%   BMI 38.27 kg/m²     General appearance: alert, cooperative, no distress  Lungs: clear to auscultation bilaterally  Heart: regular rate and rhythm, S1, S2 normal, no murmur  Abdomen: soft, non-tender. Bowel sounds normal.   Extremities: left shoulder in sling. Good sensation and capillary refill to left fingers. Neurologic: Grossly normal    Disposition:Transfer downtown  Discharge Condition: stable  Patient Instructions:   Current Discharge Medication List      START taking these medications    Details   enoxaparin (LOVENOX) 40 mg/0.4 mL 0.4 mL by SubCUTAneous route every twenty-four (24) hours. Qty: 1 mL, Refills: 0      hydrALAZINE (APRESOLINE) 10 mg tablet Take 1 Tab by mouth every eight (8) hours as needed (systolic greater than 456). Qty: 1 Tab, Refills: 0      morphine 2 mg/mL injection 1 mL by IntraVENous route every three (3) hours as needed for Pain for up to 3 days. Max Daily Amount: 16 mg.  Qty: 1 mL, Refills: 0    Associated Diagnoses: Pain      ondansetron (ZOFRAN) 4 mg/2 mL soln 2 mL by IntraVENous route every six (6) hours as needed for Nausea. Qty: 1 mL, Refills: 0      tapentadoL (Nucynta) 50 mg tablet Take 50 mg by mouth every six (6) hours as needed for Pain for up to 3 days. Max Daily Amount: 200 mg. Qty: 8 Tab, Refills: 0    Associated Diagnoses: Other closed displaced fracture of proximal end of left humerus, initial encounter         CONTINUE these medications which have NOT CHANGED    Details   multivitamin (ONE A DAY) tablet Take 1 Tab by mouth daily. acetaminophen (TYLENOL) 325 mg tablet Take 500 mg by mouth two (2) times a day. Indications: PAIN      ranitidine (ZANTAC) 150 mg tablet Take 150 mg by mouth daily. Take / use AM day of surgery  per anesthesia protocols. Indications: GASTROESOPHAGEAL REFLUX      diphenhydrAMINE (BENADRYL) 25 mg capsule Take 25 mg by mouth nightly as needed for Sleep.  Indications: INSOMNIA         STOP taking these medications       naproxen sodium (ALEVE) 220 mg tablet Comments:   Reason for Stopping:         diazepam (VALIUM) 5 mg tablet Comments:   Reason for Stopping:         traMADol (ULTRAM) 50 mg tablet Comments:   Reason for Stopping:               Activity:Up and vinh with assistance   Diet:Cardiac   Wound Care:None   ·     Time spent to discharge patient 35 minutes  Signed:  Xiomara Mcgee DO  10/12/2020  9:27 AM

## 2020-10-12 NOTE — PROGRESS NOTES
Telemetry monitor removed from pt and notified supervisor.   Pt assisted to stretcher for transfer to  room 823

## 2020-10-13 ENCOUNTER — ANESTHESIA EVENT (OUTPATIENT)
Dept: SURGERY | Age: 70
DRG: 494 | End: 2020-10-13
Payer: MEDICARE

## 2020-10-13 ENCOUNTER — PATIENT OUTREACH (OUTPATIENT)
Dept: CASE MANAGEMENT | Age: 70
End: 2020-10-13

## 2020-10-13 LAB
ANION GAP SERPL CALC-SCNC: 5 MMOL/L (ref 7–16)
BUN SERPL-MCNC: 14 MG/DL (ref 8–23)
CALCIUM SERPL-MCNC: 8.5 MG/DL (ref 8.3–10.4)
CHLORIDE SERPL-SCNC: 110 MMOL/L (ref 98–107)
CO2 SERPL-SCNC: 28 MMOL/L (ref 21–32)
CREAT SERPL-MCNC: 0.87 MG/DL (ref 0.6–1)
GLUCOSE SERPL-MCNC: 125 MG/DL (ref 65–100)
POTASSIUM SERPL-SCNC: 4 MMOL/L (ref 3.5–5.1)
SODIUM SERPL-SCNC: 143 MMOL/L (ref 136–145)

## 2020-10-13 PROCEDURE — 36415 COLL VENOUS BLD VENIPUNCTURE: CPT

## 2020-10-13 PROCEDURE — 97112 NEUROMUSCULAR REEDUCATION: CPT

## 2020-10-13 PROCEDURE — 74011250637 HC RX REV CODE- 250/637: Performed by: FAMILY MEDICINE

## 2020-10-13 PROCEDURE — 65270000029 HC RM PRIVATE

## 2020-10-13 PROCEDURE — 97162 PT EVAL MOD COMPLEX 30 MIN: CPT

## 2020-10-13 PROCEDURE — 74011250636 HC RX REV CODE- 250/636: Performed by: FAMILY MEDICINE

## 2020-10-13 PROCEDURE — 74011250636 HC RX REV CODE- 250/636: Performed by: INTERNAL MEDICINE

## 2020-10-13 PROCEDURE — 80048 BASIC METABOLIC PNL TOTAL CA: CPT

## 2020-10-13 PROCEDURE — 2709999900 HC NON-CHARGEABLE SUPPLY

## 2020-10-13 PROCEDURE — 97116 GAIT TRAINING THERAPY: CPT

## 2020-10-13 RX ORDER — HYDRALAZINE HYDROCHLORIDE 25 MG/1
25 TABLET, FILM COATED ORAL 3 TIMES DAILY
Status: DISCONTINUED | OUTPATIENT
Start: 2020-10-13 | End: 2020-10-13

## 2020-10-13 RX ORDER — OXYCODONE AND ACETAMINOPHEN 5; 325 MG/1; MG/1
1 TABLET ORAL
Status: DISCONTINUED | OUTPATIENT
Start: 2020-10-13 | End: 2020-10-13

## 2020-10-13 RX ORDER — MORPHINE SULFATE 2 MG/ML
2 INJECTION, SOLUTION INTRAMUSCULAR; INTRAVENOUS
Status: DISCONTINUED | OUTPATIENT
Start: 2020-10-13 | End: 2020-10-14

## 2020-10-13 RX ORDER — KETOROLAC TROMETHAMINE 15 MG/ML
15 INJECTION, SOLUTION INTRAMUSCULAR; INTRAVENOUS ONCE
Status: DISCONTINUED | OUTPATIENT
Start: 2020-10-13 | End: 2020-10-13

## 2020-10-13 RX ADMIN — MORPHINE SULFATE 2 MG: 2 INJECTION, SOLUTION INTRAMUSCULAR; INTRAVENOUS at 04:38

## 2020-10-13 RX ADMIN — MORPHINE SULFATE 2 MG: 2 INJECTION, SOLUTION INTRAMUSCULAR; INTRAVENOUS at 21:14

## 2020-10-13 RX ADMIN — TAPENTADOL HYDROCHLORIDE 50 MG: 50 TABLET, FILM COATED ORAL at 22:42

## 2020-10-13 RX ADMIN — FAMOTIDINE 20 MG: 20 TABLET ORAL at 09:06

## 2020-10-13 RX ADMIN — ACETAMINOPHEN 650 MG: 325 TABLET, FILM COATED ORAL at 21:14

## 2020-10-13 RX ADMIN — TAPENTADOL HYDROCHLORIDE 50 MG: 50 TABLET, FILM COATED ORAL at 09:06

## 2020-10-13 RX ADMIN — MORPHINE SULFATE 2 MG: 2 INJECTION, SOLUTION INTRAMUSCULAR; INTRAVENOUS at 12:52

## 2020-10-13 RX ADMIN — FAMOTIDINE 20 MG: 20 TABLET ORAL at 18:17

## 2020-10-13 RX ADMIN — HYDRALAZINE HYDROCHLORIDE 10 MG: 10 TABLET, FILM COATED ORAL at 10:45

## 2020-10-13 NOTE — ANESTHESIA PREPROCEDURE EVALUATION
Relevant Problems   No relevant active problems       Anesthetic History   No history of anesthetic complications            Review of Systems / Medical History  Patient summary reviewed and pertinent labs reviewed    Pulmonary  Within defined limits                 Neuro/Psych         Psychiatric history (Anxiety)    Comments: Fainting spell resulting in possible sz activity. Neurology doesn't seem to think the pt had a sz.   Cardiovascular                  Exercise tolerance: >4 METS  Comments: Denies CP, SOB or recent changes in functional status   GI/Hepatic/Renal     GERD: well controlled           Endo/Other      Hypothyroidism: well controlled  Morbid obesity and arthritis     Other Findings              Physical Exam    Airway  Mallampati: II  TM Distance: 4 - 6 cm  Neck ROM: normal range of motion   Mouth opening: Normal     Cardiovascular    Rhythm: regular  Rate: normal         Dental    Dentition: Caps/crowns     Pulmonary  Breath sounds clear to auscultation               Abdominal  GI exam deferred       Other Findings            Anesthetic Plan    ASA: 3  Anesthesia type: general      Post-op pain plan if not by surgeon: peripheral nerve block single    Induction: Intravenous  Anesthetic plan and risks discussed with: Patient

## 2020-10-13 NOTE — PROGRESS NOTES
2020           Admit Date: 10/12/2020  Admit Diagnosis: Humerus fracture [S42.309A]       Principle Problem: closed left proximal humerus fractrure          Subjective: Doing ok, pain is being controlled, No SOB, No Chest Pain, No Nausea or Vomiting     Objective:   Vital Signs are Stable, No Acute Distress, Alert,  Neurovascular exam is normal.     Assessment / Plan :  Patient Active Problem List   Diagnosis Code    Status post hip replacement Z96.649    Osteoarthritis M19.90    S/P total knee arthroplasty Z96.659    Leukocytosis D72.829    Closed fracture of left proximal humerus S42.202A    Seizure ([facility] [address] R56.9    Humerus fracture S42.309A    Syncope R55      Patient Vitals for the past 8 hrs:   BP Temp Pulse Resp SpO2   10/13/20 0752 (!) 169/76 98.4 °F (36.9 °C) 83 17 96 %   10/13/20 0432 -- -- 80 -- --   10/13/20 0427 (!) 157/89 98.3 °F (36.8 °C) 96 18 95 %   10/13/20 0400 -- -- 80 -- --    Temp (24hrs), Av.3 °F (36.8 °C), Min:98 °F (36.7 °C), Max:98.6 °F (37 °C)    There is no height or weight on file to calculate BMI. Lab Results   Component Value Date/Time    HGB 12.1 10/11/2020 03:18 AM          Closed left proximal humerus fracture. Plan is for ORIF tomorrow with Dr. Mario Jason. NPO after midnight.         Signed By: JEFFERY Weeks  10/13/2020,  8:36 AM

## 2020-10-13 NOTE — PROGRESS NOTES
Occupational Therapy Note:    OT orders received, chart reviewed, and evaluation attempted. Patient with Closed left proximal humerus fracture. Plan is for ORIF tomorrow. Will check back after sx as schedule permits and patient is available to initiate occupational therapy evaluation.      Dorcas Noel, OTR/L

## 2020-10-13 NOTE — PROGRESS NOTES
Hospitalist Progress Note    10/13/2020  Admit Date: 10/12/2020  1:28 PM   NAME: Yoli Hernandez   :  1950   MRN:  [id number]   Attending: Jonelle Gallo DO  PCP:  None    SUBJECTIVE:     Yoli Hernandez is a 69 y.o. female with a past medical history of GERD, hypothyroidism who presents to the ER after a fall at home from tripping on a mat. She landed on her L arm and had immediate severe pain in her L shoulder. She came to the ER and was diagnosed with proximal L humeral fracture and was was discharged. However, after waiting in the ER lobby to be picked up by her , she attempted to ambulate to her car, the patient reports that she felt very lightheaded and dizzy, and then lost consciousness. Per staff, she was unconscious for about 15 seconds and had some rhythmic shaking movements during this time. The patient reports feeling fine now, admitting to improvement of pain after IV morphine. .    Interval History (10/13): patient examined at bedside. No acute overnight events. Patient anxious about her pain medications, says, \"I cannot take oral pain meds it messes up with my stomach, can I just keep morphine in my chart? \" No fevers/chills, chest pain, shortness of breath, abdominal pain, nausea/vomiting, or diarrhea. Review of Systems negative with exception of pertinent positives noted above  PHYSICAL EXAM     Visit Vitals  BP (!) 169/79 (BP 1 Location: Right arm, BP Patient Position: At rest)   Pulse 88   Temp 98.2 °F (36.8 °C)   Resp 18   SpO2 99%      Temp (24hrs), Av.3 °F (36.8 °C), Min:98.1 °F (36.7 °C), Max:98.6 °F (37 °C)    Oxygen Therapy  O2 Sat (%): 99 % (10/13/20 1614)  No intake or output data in the 24 hours ending 10/13/20 1755   General: No acute distress    Lungs:  CTA Bilaterally.    Heart:  Regular rate and rhythm,  No murmur, rub, or gallop  Abdomen: Soft, Non distended, Non tender, Positive bowel sounds  Extremities: Left shoulder in sling  Neurologic:  No focal deficits    ASSESSMENT      Active Hospital Problems    Diagnosis Date Noted    Syncope 10/11/2020    Closed fracture of left proximal humerus 10/10/2020    Osteoarthritis 10/01/2015     Plan:    # Left humerus fracture  - scheduled for OR tomorrow  - post-op management and DVT ppx per orthopedic surgery  - PT/OT following surgery    # Syncopal event  - unremarkable workup  - neurology signed off, do not recommend any further workup    F/E/N: no fluids, replete electrolytes as needed, NPO after MN    Ppx: SCDs for VTE    Code Status: FULL CODE    Disposition: pending clinical improvement with plan as above. ? Home at discharge in 2-3 days. Discussed with patient at bedside. All questions answered.      Signed By: Germaine Morning, DO     October 13, 2020

## 2020-10-13 NOTE — PROGRESS NOTES
Problem: Mobility Impaired (Adult and Pediatric)  Goal: *Acute Goals and Plan of Care (Insert Text)  Outcome: Progressing Towards Goal  Note: STG:  (1.)Ms. Rosy Rand will move from supine to sit and sit to supine , scoot up and down, and roll side to side with SUPERVISION within 3 treatment day(s). (2.)Ms. Rosy Rand will transfer from bed to chair and chair to bed with CONTACT GUARD ASSIST using the least restrictive device within 3 treatment day(s). (3.)Ms. Rosy Rand will ambulate with CONTACT GUARD ASSIST for 50+ feet with the least restrictive device within 3 treatment day(s). (4.)Ms. Rosy Rand will perform standing static and dynamic balance activities x 15 minutes with CONTACT GUARD ASSIST to improve safety within 3 day(s). LTG:  (1.)Ms. Rosy Rand will move from supine to sit and sit to supine , scoot up and down, and roll side to side in bed with MODIFIED INDEPENDENCE within 7 treatment day(s). (2.)Ms. Rosy Rand will transfer from bed to chair and chair to bed with SUPERVISION using the least restrictive device within 7 treatment day(s). (3.)Ms. Rosy Rnad will ambulate with SUPERVISION for 150+ feet with the least restrictive device within 7 treatment day(s). \  (4.)Ms. Rosy Rand will perform standing static and dynamic balance activities x 15 minutes with SUPERVISION to improve safety within 7 day(s). (5.)Ms. Rosy Rand will ascend and descend 3 stairs using 0-1 hand rail(s) with CONTACT GUARD ASSIST to improve functional mobility and safety within 7 day(s).   ________________________________________________________________________________________________    PHYSICAL THERAPY: Initial Assessment, Daily Note, and AM 10/13/2020  INPATIENT: PT Visit Days : 1  [place] / [place] / Product Type: Medicare /       NAME/AGE/GENDER: Jaret Hernandez is a 69 y.o. female   PRIMARY DIAGNOSIS: Humerus fracture [S42.309A] Closed fracture of left proximal humerus Closed fracture of left proximal humerus  Procedure(s) (LRB):  LEFT HUMERUS OPEN REDUCTION INTERNAL FIXATION (Left)     ICD-10: Treatment Diagnosis:    Generalized Muscle Weakness (M62.81)  Difficulty in walking, Not elsewhere classified (R26.2)  Repeated Falls (R29.6)  History of falling (Z91.81)   Precaution/Allergies:  Oxycodone and Sulfa (sulfonamide antibiotics)      ASSESSMENT:     Ms. Guanako Henson is a 69 y.o. female admitted for closed fracture of L proximal humerus after a fall. She is scheduled for surgery tomorrow and see this AM for initial PT evaluation. Patient provided option of waiting until post surgery, however reported she would like to have assistance ambulating to restroom and sitting up in chair this AM. Educated on maintaining NWB in LUE throughout entirety of PT eval/treatment and patient able to do so with 0 cues. She lives with her  in a single story home with 3 small steps to enter and is typically independent with ambulation/ADLs, driving, cooking/cleaning at baseline although admits to balance deficits. She has history of congenital spinal issues, R LAURA and L TKA and occasionally uses a rollator. Admits to limited activity the past several months and, \"not getting out of the house much. \" She frequently trips, however this is the only fall which resulted in her landing on the ground in the past 6 months. She required additional time to complete all mobility, able to transfer to sitting with CGA and verbal cues. Noted bracing of her LUE with R hand (she is right hand dominant). She stood with CGA. Treatment initiated to include gait training with hand held assist minimal assist for ambulation into restroom, neuro re-education for standing balance activities while performing katharine care and continued gait training in room. Positioned up in recliner with LUE supported on pillow for comfort and all needs in reach.  Mallorie Garcia is currently functioning below her baseline and would benefit from skilled PT during acute care stay to maximize safety and independence with functional mobility. This section established at most recent assessment   PROBLEM LIST (Impairments causing functional limitations):  Decreased Strength  Decreased ADL/Functional Activities  Decreased Transfer Abilities  Decreased Ambulation Ability/Technique  Decreased Balance  Increased Pain  Decreased Activity Tolerance  Decreased Knowledge of Precautions  Decreased Anderson with Home Exercise Program   INTERVENTIONS PLANNED: (Benefits and precautions of physical therapy have been discussed with the patient.)  Balance Exercise  Bed Mobility  Family Education  Gait Training  Home Exercise Program (HEP)  Neuromuscular Re-education/Strengthening  Therapeutic Activites  Therapeutic Exercise/Strengthening  Transfer Training     TREATMENT PLAN: Frequency/Duration: 3 times a week for duration of hospital stay  Rehabilitation Potential For Stated Goals: Excellent     REHAB RECOMMENDATIONS (at time of discharge pending progress):    Placement: It is my opinion, based on this patient's performance to date, that Ms. Yesenia Gong may benefit from participating in 1-2 additional therapy sessions in order to continue to assess for rehab potential and then make recommendation for disposition at discharge. Equipment:   None at this time              HISTORY:   History of Present Injury/Illness (Reason for Referral):  Donavon Mcrae is a 69 y.o. female with a past medical history of GERD, hypothyroidism who presents to the ER after a fall at home from tripping on a mat. She landed on her L arm and had immediate severe pain in her L shoulder. She came to the ER and was diagnosed with proximal L humeral fracture and was was discharged. However, after waiting in the ER lobby to be picked up by her , she attempted to ambulate to her car, the patient reports that she felt very lightheaded and dizzy, and then lost consciousness.  Per staff, she was unconscious for about 15 seconds and had some rhythmic shaking movements during this time. The patient reports feeling fine now, admitting to improvement of pain after IV morphine. Past Medical History/Comorbidities:   Ms. Marilu Moeller  has a past medical history of Arthritis, Chronic pain, DDD (degenerative disc disease), GERD (gastroesophageal reflux disease), Psychiatric disorder, and Thyroid disease. She also has no past medical history of Adverse effect of anesthesia, Aneurysm ([address] Arrhythmia, Asthma, Autoimmune disease ([address] CAD (coronary artery disease), Cancer ([address] Chronic kidney disease, Chronic obstructive pulmonary disease ([address] Coagulation disorder ([address] Diabetes ([address] Difficult intubation, Heart failure ([address] Hypertension, Liver disease, Malignant hyperthermia due to anesthesia, Morbid obesity ([address] Nausea & vomiting, Other unknown and unspecified cause of morbidity or mortality, Pseudocholinesterase deficiency, PUD (peptic ulcer disease), Seizures ([address] Stroke ([address] Thromboembolus ([address] or Unspecified sleep apnea. Ms. Marilu Moeller  has a past surgical history that includes hx hip replacement (Right, 04/2014). Social History/Living Environment:   Home Environment: Private residence  # Steps to Enter: 3  One/Two Story Residence: One story  Living Alone: No  Support Systems: Spouse/Significant Other/Partner  Patient Expects to be Discharged to[de-identified] Private residence  Current DME Used/Available at Home: [employer], rollator, Commode, bedside, Shower chair, Cane, straight  Prior Level of Function/Work/Activity:  She lives with her  in a single story home with 3 small steps to enter and is typically independent with ambulation/ADLs, driving, cooking/cleaning at baseline although admits to balance deficits. She has history of congenital spinal issues, R LAURA and L TKA and occasionally uses a rollator. Admits to limited activity the past several months and, \"not getting out of the house much. \" She frequently trips, however this is the only fall which resulted in her landing on the ground in the past 6 months. Number of Personal Factors/Comorbidities that affect the Plan of Care: 3+: HIGH COMPLEXITY   EXAMINATION:   Most Recent Physical Functioning:   Gross Assessment:  AROM: Generally decreased, functional  PROM: Generally decreased, functional  Strength: Generally decreased, functional  Coordination: Generally decreased, functional               Posture:  Posture (WDL): Exceptions to WDL  Posture Assessment: Forward head  Balance:  Sitting: Intact  Standing: Impaired  Standing - Static: Fair  Standing - Dynamic : Fair Bed Mobility:  Supine to Sit: Contact guard assistance; Additional time  Scooting: Contact guard assistance; Adaptive equipment; Additional time  Wheelchair Mobility:     Transfers:  Sit to Stand: Minimum assistance  Stand to Sit: Minimum assistance  Bed to Chair: Minimum assistance  Gait:     Base of Support: Center of gravity altered; Widened  Speed/Nicole: Slow;Shuffled;Pace decreased (<100 feet/min)  Gait Abnormalities: Decreased step clearance; Path deviations; Shuffling gait;Trunk sway increased  Distance (ft): 10 Feet (ft)(x2)  Assistive Device: Brace/Splint  Ambulation - Level of Assistance: Minimal assistance  Interventions: Manual cues; Safety awareness training;Visual/Demos; Verbal cues      Body Structures Involved:  Nerves  Bones  Joints  Muscles  Ligaments Body Functions Affected:  Sensory/Pain  Neuromusculoskeletal  Movement Related Activities and Participation Affected:   Mobility  Self Care  Domestic Life  Interpersonal Interactions and Relationships  Community, Social and [place]   Number of elements that affect the Plan of Care: 4+: HIGH COMPLEXITY   CLINICAL PRESENTATION:   Presentation: Evolving clinical presentation with changing clinical characteristics: MODERATE COMPLEXITY   CLINICAL DECISION MAKIN [facility] Short Form  How much difficulty does the patient currently have. .. Unable A Lot A Little None   1. Turning over in bed (including adjusting bedclothes, sheets and blankets)? [] 1   [] 2   [x] 3   [] 4   2. Sitting down on and standing up from a chair with arms ( e.g., wheelchair, bedside commode, etc.)   [] 1   [] 2   [x] 3   [] 4   3. Moving from lying on back to sitting on the side of the bed? [] 1   [] 2   [x] 3   [] 4   How much help from another person does the patient currently need. .. Total A Lot A Little None   4. Moving to and from a bed to a chair (including a wheelchair)? [] 1   [] 2   [x] 3   [] 4   5. Need to walk in hospital room? [] 1   [] 2   [x] 3   [] 4   6. Climbing 3-5 steps with a railing? [] 1   [x] 2   [] 3   [] 4   © 2007, Trustees of [address] [place] [address], under license to [employer]. All rights reserved      Score:  Initial: 17 Most Recent: X (Date: -- )    Interpretation of Tool:  Represents activities that are increasingly more difficult (i.e. Bed mobility, Transfers, Gait). Medical Necessity:     Patient demonstrates   excellent   rehab potential due to higher previous functional level. Reason for Services/Other Comments:  Patient   continues to require modification of therapeutic interventions to increase complexity of exercises  . Use of outcome tool(s) and clinical judgement create a POC that gives a: Questionable prediction of patient's progress: MODERATE COMPLEXITY            TREATMENT:   (In addition to Assessment/Re-Assessment sessions the following treatments were rendered)   Pre-treatment Symptoms/Complaints:  L shoulderp ain  Pain: Initial:   Pain Intensity 1: 5  Post Session:  3/10 once positioned in recliner with support     Gait Training ( 23 minutes):  Gait training to improve and/or restore physical functioning as related to mobility, strength, and balance. Ambulated 10 Feet (ft)(x2) with Minimal assistance using a Brace/Splint and minimal Manual cues; Safety awareness training;Visual/Demos; Verbal cues related to their stance phase and pelvis position and motion to promote proper body alignment, promote proper body posture, and promote proper body mechanics. Neuromuscular Re-education: ( 15 minutes):  standing balance activities in room and sit<>stand transfers from various surface heights to improve balance, coordination, and posture. Required minimal visual and manual cues to promote static and dynamic balance in standing. Braces/Orthotics/Lines/Etc:   O2 Room Air  Sling/cast applied to LUE  Treatment/Session Assessment:    Response to Treatment:  patient with increased pain with mobility, improved at rest.  Interdisciplinary Collaboration:   Physical Therapist  Registered Nurse  After treatment position/precautions:   Up in chair  Bed/Chair-wheels locked  Bed in low position  Call light within reach  RN notified   Compliance with Program/Exercises: Will assess as treatment progresses  Recommendations/Intent for next treatment session: \"Next visit will focus on advancements to more challenging activities and reduction in assistance provided\".   Total Treatment Duration:  PT Patient Time In/Time Out  Time In: 0849  Time Out: 0945    Senait Cancer, PT, DPT

## 2020-10-13 NOTE — PROGRESS NOTES
Order for home health therapy noted. No CM referral seen. Message sent to [facility] to see if referral has been sent. Therapy recommendations pending.

## 2020-10-13 NOTE — PROGRESS NOTES
Opened chart for WEBB JASSON outreach, per [facility] notes patient is currently inpatient . No further HILDA outreach notes at this time. Patient will be re-assigned pending d/c disposition.

## 2020-10-14 ENCOUNTER — APPOINTMENT (OUTPATIENT)
Dept: GENERAL RADIOLOGY | Age: 70
DRG: 494 | End: 2020-10-14
Attending: INTERNAL MEDICINE
Payer: MEDICARE

## 2020-10-14 ENCOUNTER — APPOINTMENT (OUTPATIENT)
Dept: GENERAL RADIOLOGY | Age: 70
DRG: 494 | End: 2020-10-14
Attending: ORTHOPAEDIC SURGERY
Payer: MEDICARE

## 2020-10-14 ENCOUNTER — ANESTHESIA (OUTPATIENT)
Dept: SURGERY | Age: 70
DRG: 494 | End: 2020-10-14
Payer: MEDICARE

## 2020-10-14 VITALS
HEART RATE: 79 BPM | RESPIRATION RATE: 18 BRPM | TEMPERATURE: 98.2 F | SYSTOLIC BLOOD PRESSURE: 151 MMHG | OXYGEN SATURATION: 100 % | DIASTOLIC BLOOD PRESSURE: 81 MMHG

## 2020-10-14 LAB
ABO + RH BLD: NORMAL
BLOOD GROUP ANTIBODIES SERPL: NORMAL
SPECIMEN EXP DATE BLD: NORMAL

## 2020-10-14 PROCEDURE — 76942 ECHO GUIDE FOR BIOPSY: CPT | Performed by: ORTHOPAEDIC SURGERY

## 2020-10-14 PROCEDURE — 97535 SELF CARE MNGMENT TRAINING: CPT

## 2020-10-14 PROCEDURE — 77030010509 HC AIRWY LMA MSK TELE -A: Performed by: ANESTHESIOLOGY

## 2020-10-14 PROCEDURE — 77030031139 HC SUT VCRL2 J&J -A: Performed by: ORTHOPAEDIC SURGERY

## 2020-10-14 PROCEDURE — 74011250636 HC RX REV CODE- 250/636: Performed by: PHYSICIAN ASSISTANT

## 2020-10-14 PROCEDURE — 73060 X-RAY EXAM OF HUMERUS: CPT

## 2020-10-14 PROCEDURE — 97530 THERAPEUTIC ACTIVITIES: CPT

## 2020-10-14 PROCEDURE — 77030008848 HC WRE K SYNT -B: Performed by: ORTHOPAEDIC SURGERY

## 2020-10-14 PROCEDURE — 36415 COLL VENOUS BLD VENIPUNCTURE: CPT

## 2020-10-14 PROCEDURE — 74011250637 HC RX REV CODE- 250/637: Performed by: FAMILY MEDICINE

## 2020-10-14 PROCEDURE — 74011000250 HC RX REV CODE- 250: Performed by: ORTHOPAEDIC SURGERY

## 2020-10-14 PROCEDURE — 86900 BLOOD TYPING SEROLOGIC ABO: CPT

## 2020-10-14 PROCEDURE — 0PSD06Z REPOSITION LEFT HUMERAL HEAD WITH INTRAMEDULLARY INTERNAL FIXATION DEVICE, OPEN APPROACH: ICD-10-PCS | Performed by: ORTHOPAEDIC SURGERY

## 2020-10-14 PROCEDURE — 76060000032 HC ANESTHESIA 0.5 TO 1 HR: Performed by: ORTHOPAEDIC SURGERY

## 2020-10-14 PROCEDURE — 74011250636 HC RX REV CODE- 250/636: Performed by: ANESTHESIOLOGY

## 2020-10-14 PROCEDURE — 77030008467 HC STPLR SKN COVD -B: Performed by: ORTHOPAEDIC SURGERY

## 2020-10-14 PROCEDURE — 77030017016 HC DSG ANTIMIC BARR2 S&N -B: Performed by: ORTHOPAEDIC SURGERY

## 2020-10-14 PROCEDURE — 74011250636 HC RX REV CODE- 250/636: Performed by: REGISTERED NURSE

## 2020-10-14 PROCEDURE — 76010010054 HC POST OP PAIN BLOCK: Performed by: ORTHOPAEDIC SURGERY

## 2020-10-14 PROCEDURE — 77030016474 HC BIT DRL QC3 SYNT -C: Performed by: ORTHOPAEDIC SURGERY

## 2020-10-14 PROCEDURE — 74011250636 HC RX REV CODE- 250/636: Performed by: INTERNAL MEDICINE

## 2020-10-14 PROCEDURE — 74011000250 HC RX REV CODE- 250: Performed by: REGISTERED NURSE

## 2020-10-14 PROCEDURE — C1713 ANCHOR/SCREW BN/BN,TIS/BN: HCPCS | Performed by: ORTHOPAEDIC SURGERY

## 2020-10-14 PROCEDURE — 77030033067 HC SUT PDO STRATFX SPIR J&J -B: Performed by: ORTHOPAEDIC SURGERY

## 2020-10-14 PROCEDURE — 2709999900 HC NON-CHARGEABLE SUPPLY: Performed by: ORTHOPAEDIC SURGERY

## 2020-10-14 PROCEDURE — 97116 GAIT TRAINING THERAPY: CPT

## 2020-10-14 PROCEDURE — 74011250637 HC RX REV CODE- 250/637: Performed by: ORTHOPAEDIC SURGERY

## 2020-10-14 PROCEDURE — 76210000006 HC OR PH I REC 0.5 TO 1 HR: Performed by: ORTHOPAEDIC SURGERY

## 2020-10-14 PROCEDURE — C1769 GUIDE WIRE: HCPCS | Performed by: ORTHOPAEDIC SURGERY

## 2020-10-14 PROCEDURE — 76010000160 HC OR TIME 0.5 TO 1 HR INTENSV-TIER 1: Performed by: ORTHOPAEDIC SURGERY

## 2020-10-14 PROCEDURE — 77030017024 HC ENDCAP HUM NL1 SYNT -C: Performed by: ORTHOPAEDIC SURGERY

## 2020-10-14 PROCEDURE — 97165 OT EVAL LOW COMPLEX 30 MIN: CPT

## 2020-10-14 PROCEDURE — 77030006618 HC IMPL BLD F/HUM SPRL NL SYNT -F: Performed by: ORTHOPAEDIC SURGERY

## 2020-10-14 DEVICE — IMPLANTABLE DEVICE: Type: IMPLANTABLE DEVICE | Site: HUMERUS | Status: FUNCTIONAL

## 2020-10-14 DEVICE — ENDCAP ORTH 0MM EXTN TI W/ T25 STARDRV FOR HUM NAIL-EX SPRL: Type: IMPLANTABLE DEVICE | Site: HUMERUS | Status: FUNCTIONAL

## 2020-10-14 DEVICE — SCREW BNE L28MM DIA4MM TIB BLU TI ST CANN LOK FULL THRD T25: Type: IMPLANTABLE DEVICE | Site: HUMERUS | Status: FUNCTIONAL

## 2020-10-14 RX ORDER — ONDANSETRON 2 MG/ML
INJECTION INTRAMUSCULAR; INTRAVENOUS AS NEEDED
Status: DISCONTINUED | OUTPATIENT
Start: 2020-10-14 | End: 2020-10-14 | Stop reason: HOSPADM

## 2020-10-14 RX ORDER — CEFAZOLIN SODIUM/WATER 2 G/20 ML
2 SYRINGE (ML) INTRAVENOUS ONCE
Status: COMPLETED | OUTPATIENT
Start: 2020-10-14 | End: 2020-10-14

## 2020-10-14 RX ORDER — ROPIVACAINE HYDROCHLORIDE 5 MG/ML
INJECTION, SOLUTION EPIDURAL; INFILTRATION; PERINEURAL
Status: COMPLETED | OUTPATIENT
Start: 2020-10-14 | End: 2020-10-14

## 2020-10-14 RX ORDER — MIDAZOLAM HYDROCHLORIDE 1 MG/ML
2 INJECTION, SOLUTION INTRAMUSCULAR; INTRAVENOUS
Status: COMPLETED | OUTPATIENT
Start: 2020-10-14 | End: 2020-10-14

## 2020-10-14 RX ORDER — ASPIRIN 325 MG
325 TABLET, DELAYED RELEASE (ENTERIC COATED) ORAL DAILY
Qty: 30 TAB | Refills: 0 | Status: SHIPPED | OUTPATIENT
Start: 2020-10-14 | End: 2020-11-13

## 2020-10-14 RX ORDER — LIDOCAINE HYDROCHLORIDE AND EPINEPHRINE 10; 10 MG/ML; UG/ML
INJECTION, SOLUTION INFILTRATION; PERINEURAL AS NEEDED
Status: DISCONTINUED | OUTPATIENT
Start: 2020-10-14 | End: 2020-10-14 | Stop reason: HOSPADM

## 2020-10-14 RX ORDER — FENTANYL CITRATE 50 UG/ML
100 INJECTION, SOLUTION INTRAMUSCULAR; INTRAVENOUS AS NEEDED
Status: DISCONTINUED | OUTPATIENT
Start: 2020-10-14 | End: 2020-10-14 | Stop reason: HOSPADM

## 2020-10-14 RX ORDER — ONDANSETRON 2 MG/ML
4 INJECTION INTRAMUSCULAR; INTRAVENOUS ONCE
Status: DISCONTINUED | OUTPATIENT
Start: 2020-10-14 | End: 2020-10-14 | Stop reason: HOSPADM

## 2020-10-14 RX ORDER — MORPHINE SULFATE 4 MG/ML
4 INJECTION INTRAVENOUS
Status: DISCONTINUED | OUTPATIENT
Start: 2020-10-14 | End: 2020-10-14 | Stop reason: HOSPADM

## 2020-10-14 RX ORDER — HYDROMORPHONE HYDROCHLORIDE 2 MG/ML
0.5 INJECTION, SOLUTION INTRAMUSCULAR; INTRAVENOUS; SUBCUTANEOUS
Status: DISCONTINUED | OUTPATIENT
Start: 2020-10-14 | End: 2020-10-14 | Stop reason: HOSPADM

## 2020-10-14 RX ORDER — SODIUM CHLORIDE, SODIUM LACTATE, POTASSIUM CHLORIDE, CALCIUM CHLORIDE 600; 310; 30; 20 MG/100ML; MG/100ML; MG/100ML; MG/100ML
75 INJECTION, SOLUTION INTRAVENOUS CONTINUOUS
Status: DISCONTINUED | OUTPATIENT
Start: 2020-10-14 | End: 2020-10-14 | Stop reason: HOSPADM

## 2020-10-14 RX ORDER — LIDOCAINE HYDROCHLORIDE 10 MG/ML
0.1 INJECTION INFILTRATION; PERINEURAL AS NEEDED
Status: DISCONTINUED | OUTPATIENT
Start: 2020-10-14 | End: 2020-10-14 | Stop reason: HOSPADM

## 2020-10-14 RX ORDER — HYDROCODONE BITARTRATE AND ACETAMINOPHEN 5; 325 MG/1; MG/1
2 TABLET ORAL
Status: DISCONTINUED | OUTPATIENT
Start: 2020-10-14 | End: 2020-10-14 | Stop reason: HOSPADM

## 2020-10-14 RX ORDER — DIPHENHYDRAMINE HYDROCHLORIDE 50 MG/ML
12.5 INJECTION, SOLUTION INTRAMUSCULAR; INTRAVENOUS ONCE
Status: DISCONTINUED | OUTPATIENT
Start: 2020-10-14 | End: 2020-10-14 | Stop reason: HOSPADM

## 2020-10-14 RX ORDER — LIDOCAINE HYDROCHLORIDE 20 MG/ML
INJECTION, SOLUTION EPIDURAL; INFILTRATION; INTRACAUDAL; PERINEURAL AS NEEDED
Status: DISCONTINUED | OUTPATIENT
Start: 2020-10-14 | End: 2020-10-14 | Stop reason: HOSPADM

## 2020-10-14 RX ORDER — DEXAMETHASONE SODIUM PHOSPHATE 4 MG/ML
INJECTION, SOLUTION INTRA-ARTICULAR; INTRALESIONAL; INTRAMUSCULAR; INTRAVENOUS; SOFT TISSUE AS NEEDED
Status: DISCONTINUED | OUTPATIENT
Start: 2020-10-14 | End: 2020-10-14 | Stop reason: HOSPADM

## 2020-10-14 RX ORDER — PROPOFOL 10 MG/ML
INJECTION, EMULSION INTRAVENOUS AS NEEDED
Status: DISCONTINUED | OUTPATIENT
Start: 2020-10-14 | End: 2020-10-14 | Stop reason: HOSPADM

## 2020-10-14 RX ORDER — SODIUM CHLORIDE, SODIUM LACTATE, POTASSIUM CHLORIDE, CALCIUM CHLORIDE 600; 310; 30; 20 MG/100ML; MG/100ML; MG/100ML; MG/100ML
1000 INJECTION, SOLUTION INTRAVENOUS CONTINUOUS
Status: DISCONTINUED | OUTPATIENT
Start: 2020-10-14 | End: 2020-10-14 | Stop reason: HOSPADM

## 2020-10-14 RX ORDER — NALOXONE HYDROCHLORIDE 0.4 MG/ML
0.1 INJECTION, SOLUTION INTRAMUSCULAR; INTRAVENOUS; SUBCUTANEOUS AS NEEDED
Status: DISCONTINUED | OUTPATIENT
Start: 2020-10-14 | End: 2020-10-14 | Stop reason: HOSPADM

## 2020-10-14 RX ADMIN — FENTANYL CITRATE 100 MCG: 50 INJECTION, SOLUTION INTRAMUSCULAR; INTRAVENOUS at 06:59

## 2020-10-14 RX ADMIN — DEXAMETHASONE SODIUM PHOSPHATE 10 MG: 4 INJECTION, SOLUTION INTRAMUSCULAR; INTRAVENOUS at 07:29

## 2020-10-14 RX ADMIN — FAMOTIDINE 20 MG: 20 TABLET ORAL at 05:27

## 2020-10-14 RX ADMIN — LIDOCAINE HYDROCHLORIDE 100 MG: 20 INJECTION, SOLUTION EPIDURAL; INFILTRATION; INTRACAUDAL; PERINEURAL at 07:25

## 2020-10-14 RX ADMIN — MIDAZOLAM 2 MG: 1 INJECTION INTRAMUSCULAR; INTRAVENOUS at 06:59

## 2020-10-14 RX ADMIN — ACETAMINOPHEN 650 MG: 325 TABLET, FILM COATED ORAL at 05:27

## 2020-10-14 RX ADMIN — ONDANSETRON 4 MG: 2 INJECTION INTRAMUSCULAR; INTRAVENOUS at 07:29

## 2020-10-14 RX ADMIN — ROPIVACAINE HYDROCHLORIDE 30 ML: 150 INJECTION, SOLUTION EPIDURAL; INFILTRATION; PERINEURAL at 07:10

## 2020-10-14 RX ADMIN — PROPOFOL 200 MG: 10 INJECTION, EMULSION INTRAVENOUS at 07:25

## 2020-10-14 RX ADMIN — TAPENTADOL HYDROCHLORIDE 50 MG: 50 TABLET, FILM COATED ORAL at 15:21

## 2020-10-14 RX ADMIN — Medication 2 G: at 07:28

## 2020-10-14 RX ADMIN — SODIUM CHLORIDE, SODIUM LACTATE, POTASSIUM CHLORIDE, AND CALCIUM CHLORIDE 1000 ML: 600; 310; 30; 20 INJECTION, SOLUTION INTRAVENOUS at 05:59

## 2020-10-14 RX ADMIN — MORPHINE SULFATE 2 MG: 2 INJECTION, SOLUTION INTRAMUSCULAR; INTRAVENOUS at 03:12

## 2020-10-14 RX ADMIN — TAPENTADOL HYDROCHLORIDE 50 MG: 50 TABLET, FILM COATED ORAL at 05:27

## 2020-10-14 NOTE — PROGRESS NOTES
CM in to speak with patient and spouse regarding therapy recommendations for [facility] therapy. Patient and spouse agreeable to referral being sen to [facility]. Order and corresponsing information faxed.

## 2020-10-14 NOTE — ANESTHESIA POSTPROCEDURE EVALUATION
Procedure(s):  LEFT HUMERUS OPEN REDUCTION INTERNAL FIXATION. general    Anesthesia Post Evaluation      Multimodal analgesia: multimodal analgesia used between 6 hours prior to anesthesia start to PACU discharge  Patient location during evaluation: bedside  Patient participation: complete - patient participated  Level of consciousness: responsive to verbal stimuli  Pain management: adequate  Airway patency: patent  Anesthetic complications: no  Cardiovascular status: hemodynamically stable  Respiratory status: spontaneous ventilation  Hydration status: stable    Final Post Anesthesia Temperature Assessment:  Normothermia (36.0-37.5 degrees C)      INITIAL Post-op Vital signs:   Vitals Value Taken Time   /75 10/14/2020  9:02 AM   Temp 36.7 °C (98 °F) 10/14/2020  8:52 AM   Pulse 81 10/14/2020  9:03 AM   Resp 16 10/14/2020  8:52 AM   SpO2 95 % 10/14/2020  9:03 AM   Vitals shown include unvalidated device data.

## 2020-10-14 NOTE — PROGRESS NOTES
Physical Therapy Note:    Chart reviewed, patient off the floor for surgery this AM. Will attempt later as patient is able and schedule permits.     Thank you,  Iris Church, PT, DPT

## 2020-10-14 NOTE — PROGRESS NOTES
TRANSFER - IN REPORT:    Verbal report received from Summer on [address]  being received from PACU for routine progression of care      Report consisted of patients Situation, Background, Assessment and   Recommendations(SBAR). Information from the following report(s) SBAR was reviewed with the receiving nurse. Opportunity for questions and clarification was provided. Assessment completed upon patients arrival to unit and care assumed.

## 2020-10-14 NOTE — PROGRESS NOTES
Occupational Therapy Note:    OT orders received, chart review initiated. Patient off the floor in PACU following sx per chart. Will check back after pt has returned to the floor as schedule permits and patient is available to initiate evaluation.      Dorcas Alarcon, OTR/L

## 2020-10-14 NOTE — ANESTHESIA PROCEDURE NOTES
Peripheral Block    Start time: 10/14/2020 7:03 AM  End time: 10/14/2020 7:08 AM  Performed by: Rosendo Stevens MD  Authorized by: Rosendo Stevens MD       Pre-procedure: Indications: at surgeon's request, post-op pain management and procedure for pain    Preanesthetic Checklist: patient identified, risks and benefits discussed, site marked, timeout performed, anesthesia consent given and patient being monitored    Timeout Time: 07:02          Block Type:   Block Type:   Interscalene  Laterality:  Left  Monitoring:  Standard ASA monitoring, responsive to questions, oxygen, continuous pulse ox, frequent vital sign checks and heart rate  Injection Technique:  Single shot  Procedures: ultrasound guided and nerve stimulator    Patient Position: seated  Prep: chlorhexidine    Location:  Interscalene  Needle Type:  Stimuplex  Needle Gauge:  22 G  Needle Localization:  Ultrasound guidance and nerve stimulator  Motor Response comment:   Motor Response: minimal motor response >0.4 mA   Medication Injected:  Ropivacaine (PF) (NAROPIN)(0.5%) 5 mg/mL injection, 30 mL  Med Admin Time: 10/14/2020 7:10 AM    Assessment:  Number of attempts:  1  Injection Assessment:  Incremental injection every 5 mL, no paresthesia, negative aspiration for CSF, local visualized surrounding nerve on ultrasound, negative aspiration for blood, no intravascular symptoms and ultrasound image on chart  Patient tolerance:  Patient tolerated the procedure well with no immediate complications

## 2020-10-14 NOTE — PROGRESS NOTES
Called lab to notify that pt needed her type and screen stat because she is going down for surgery within the hour. They stated there were only two lab techs on the floor and that she has no way of contacting them.

## 2020-10-14 NOTE — DISCHARGE INSTRUCTIONS
Patient Education        Humerus Fracture: Care Instructions  Your Care Instructions     Your humerus is a bone in your upper arm. It extends from your shoulder to your elbow, and it is the largest bone in your arm. This bone may break (fracture) during sports, a fall, or other accidents. It may happen when your arm or shoulder is hit or used to protect you in a fall. Fractures can range from a small, hairline crack to a bone or bones broken into two or more pieces. Your treatment depends on how bad the break is. Your doctor may have put your arm in a cast, splint, or sling to allow it to heal or to keep it stable until you see another doctor. It may take weeks or months for your arm to heal. You can help your arm heal with some care at home. You heal best when you take good care of yourself. Eat a variety of healthy foods, and don't smoke. Follow-up care is a key part of your treatment and safety. Be sure to make and go to all appointments, and call your doctor if you are having problems. It's also a good idea to know your test results and keep a list of the medicines you take. How can you care for yourself at home? · Put ice or a cold pack on your arm for 10 to 20 minutes at a time. Try to do this every 1 to 2 hours for the next 3 days (when you are awake). Put a thin cloth between the ice and your cast or splint. Keep the cast or splint dry. If you do not have a splint or cast, use a cloth between the ice and your skin. · Follow the care instructions your doctor gives you. If you have a sling, do not take it off unless your doctor tells you to. · Be safe with medicines. Take pain medicines exactly as directed. ? If the doctor gave you a prescription medicine for pain, take it as prescribed. ? If you are not taking a prescription pain medicine, ask your doctor if you can take an over-the-counter medicine. · Your doctor may advise you to keep your arm next to your body.  It may help to use a pillow to support your elbow while sitting. · Follow instructions for moving your arm and doing exercises to keep your arm strong. · Wiggle your fingers and wrist often to reduce swelling and stiffness. When should you call for help? Call your doctor now or seek immediate medical care if:    · You have increased or severe pain in your arm.     · Your hand is cool or pale or changes color.     · You have tingling, weakness, or numbness in your hand or fingers.     · Your cast or splint feels too tight.     · You cannot move your fingers.     · The skin under your cast or splint is burning or stinging. Watch closely for changes in your health, and be sure to contact your doctor if:    · You do not get better as expected. Where can you learn more? Go to http://www.gray.com/  Enter I644 in the search box to learn more about \"Humerus Fracture: Care Instructions. \"  Current as of: March 2, 2020               Content Version: 12.6  © 3685-7347 [employer]. Care instructions adapted under license by [employer] (which disclaims liability or warranty for this information). If you have questions about a medical condition or this instruction, always ask your healthcare professional. [address] any warranty or liability for your use of this information.

## 2020-10-14 NOTE — INTERVAL H&P NOTE
Update History & Physical  The Patient's History and Physical of October 12, 2020 was reviewed with the patient and I examined the patient. There was no change. The surgical site was confirmed by the patient and me. Plan: I have spoken to the patient at length on the day prior to surgery regarding the nature of her injury. She does have a completely displaced surgical neck proximal humerus fracture. This is very unstable and would likely have difficulty healing without any sort of operative treatment. She is neurologically intact in her left upper extremity and her pulses are 2+ and bounding. She has pain with any range of motion of the left shoulder. Her x-rays reveal what appears to be a completely displaced 2 part proximal humerus fracture. The risk, benefits, expected outcome, and alternative to the recommended procedure have been discussed with the patient. Patient understands and wants to proceed with open treatment of left proximal humerus fracture with intramedullary nail fixation.     Electronically signed by Mercedes Rosenberg MD on 10/14/2020 at 7:00 AM

## 2020-10-14 NOTE — PROGRESS NOTES
Problem: Mobility Impaired (Adult and Pediatric)  Goal: *Acute Goals and Plan of Care (Insert Text)  Outcome: Progressing Towards Goal  Note: STG:  (1.)Ms. Corey Moore will move from supine to sit and sit to supine , scoot up and down, and roll side to side with SUPERVISION within 3 treatment day(s). (2.)Ms. Corey Moore will transfer from bed to chair and chair to bed with CONTACT GUARD ASSIST using the least restrictive device within 3 treatment day(s). (3.)Ms. Corey Moore will ambulate with CONTACT GUARD ASSIST for 50+ feet with the least restrictive device within 3 treatment day(s). (4.)Ms. Corey Moore will perform standing static and dynamic balance activities x 15 minutes with CONTACT GUARD ASSIST to improve safety within 3 day(s). LTG:  (1.)Ms. Corey Moore will move from supine to sit and sit to supine , scoot up and down, and roll side to side in bed with MODIFIED INDEPENDENCE within 7 treatment day(s). (2.)Ms. Corey Moore will transfer from bed to chair and chair to bed with SUPERVISION using the least restrictive device within 7 treatment day(s). (3.)Ms. Corey Moore will ambulate with SUPERVISION for 150+ feet with the least restrictive device within 7 treatment day(s). \  (4.)Ms. Corey Moore will perform standing static and dynamic balance activities x 15 minutes with SUPERVISION to improve safety within 7 day(s). (5.)Ms. Corey Moore will ascend and descend 3 stairs using 0-1 hand rail(s) with CONTACT GUARD ASSIST to improve functional mobility and safety within 7 day(s).   ________________________________________________________________________________________________    PHYSICAL THERAPY: Initial Assessment, Daily Note, and AM 10/14/2020  INPATIENT: PT Visit Days : 2  [place] / [place] / Product Type: Medicare /       NAME/AGE/GENDER: Ana Suarez is a 69 y.o. female   PRIMARY DIAGNOSIS: Humerus fracture [S42.309A] Closed fracture of left proximal humerus Closed fracture of left proximal humerus  Procedure(s) (LRB):  LEFT HUMERUS OPEN REDUCTION INTERNAL FIXATION (Left)  Day of Surgery  ICD-10: Treatment Diagnosis:    · Generalized Muscle Weakness (M62.81)  · Difficulty in walking, Not elsewhere classified (R26.2)  · Repeated Falls (R29.6)  · History of falling (Z91.81)   Precaution/Allergies:  Oxycodone and Sulfa (sulfonamide antibiotics)      ASSESSMENT:     Ms. Isaura Gerard is a 69 y.o. female admitted for closed fracture of L proximal humerus after a fall. She is seen this AM post surgery. Educated on maintaining NWB in LUE throughout entirety of PT eval/treatment and patient able to do so with 0 cues. She lives with her  in a single story home with 3 small steps to enter and is typically independent with ambulation/ADLs, driving, cooking/cleaning at baseline although admits to balance deficits. She has history of congenital spinal issues, R LAURA and LTKA and occasionally uses a rollator. Admits to limited activity the past several months and, \"not getting out of the house much. \" She frequently trips, however this is the only fall which resulted in her landing on the ground in the past 6 months. She is sitting in recliner on contact and agreeable to physical therapy treatment. She stood and ambulated 100' with CGA and single point cane with no major losses of balance. Great progress in mobility and reports significant improvement in pain levels post surgery. Positioned up in recliner with LUE supported on pillow for comfort and all needs in reach. Ellie Mann is currently functioning below her baseline and would benefit from skilled PT during acute care stay to maximize safety and independence with functional mobility. This section established at most recent assessment   PROBLEM LIST (Impairments causing functional limitations):  1. Decreased Strength  2. Decreased ADL/Functional Activities  3. Decreased Transfer Abilities  4. Decreased Ambulation Ability/Technique  5.  Decreased Balance  6. Increased Pain  7. Decreased Activity Tolerance  8. Decreased Knowledge of Precautions  9. Decreased [place] with Home Exercise Program   INTERVENTIONS PLANNED: (Benefits and precautions of physical therapy have been discussed with the patient.)  1. Balance Exercise  2. Bed Mobility  3. Family Education  4. Gait Training  5. Home Exercise Program (HEP)  6. Neuromuscular Re-education/Strengthening  7. Therapeutic Activites  8. Therapeutic Exercise/Strengthening  9. Transfer Training     TREATMENT PLAN: Frequency/Duration: 3 times a week for duration of hospital stay  Rehabilitation Potential For Stated Goals: Excellent     REHAB RECOMMENDATIONS (at time of discharge pending progress):    Placement: It is my opinion, based on this patient's performance to date, that Ms. Marycarmen Cooper may benefit from participating in 1-2 additional therapy sessions in order to continue to assess for rehab potential and then make recommendation for disposition at discharge. Equipment:    None at this time              HISTORY:   History of Present Injury/Illness (Reason for Referral):  Yoli Hernandez is a 69 y.o. female with a past medical history of GERD, hypothyroidism who presents to the ER after a fall at home from tripping on a mat. She landed on her L arm and had immediate severe pain in her L shoulder. She came to the ER and was diagnosed with proximal L humeral fracture and was was discharged. However, after waiting in the ER lobby to be picked up by her , she attempted to ambulate to her car, the patient reports that she felt very lightheaded and dizzy, and then lost consciousness. Per staff, she was unconscious for about 15 seconds and had some rhythmic shaking movements during this time. The patient reports feeling fine now, admitting to improvement of pain after IV morphine.    Past Medical History/Comorbidities:   Ms. Marycarmen Cooper  has a past medical history of Arthritis, Chronic pain, DDD (degenerative disc disease), GERD (gastroesophageal reflux disease), Psychiatric disorder, and Thyroid disease. She also has no past medical history of Adverse effect of anesthesia, Aneurysm ([address] Arrhythmia, Asthma, Autoimmune disease ([address] CAD (coronary artery disease), Cancer ([address] Chronic kidney disease, Chronic obstructive pulmonary disease ([address] Coagulation disorder ([address] Diabetes ([address] Difficult intubation, Heart failure ([address] Hypertension, Liver disease, Malignant hyperthermia due to anesthesia, Morbid obesity ([address] Nausea & vomiting, Other unknown and unspecified cause of morbidity or mortality, Pseudocholinesterase deficiency, PUD (peptic ulcer disease), Seizures ([address] Stroke ([address] Thromboembolus ([address] or Unspecified sleep apnea. [facility]  [facility] a [facility] history that includes hx hip replacement (Right, 04/2014). Social History/Living Environment:   Home Environment: Private residence  # Steps to Enter: 3  One/Two Story Residence: One story  Living Alone: No  Support Systems: Spouse/Significant Other/Partner  Patient Expects to be Discharged to[de-identified] Private residence  Current DME Used/Available at Home: Anne Quinonez, rollator, Commode, bedside, Shower chair, Cane, straight  Prior Level of Function/Work/Activity:  She lives with her  in a single story home with 3 small steps to enter and is typically independent with ambulation/ADLs, driving, cooking/cleaning at baseline although admits to balance deficits. She has history of congenital spinal issues, R LAURA and L TKA and occasionally uses a rollator. Admits to limited activity the past several months and, \"not getting out of the house much. \" She frequently trips, however this is the only fall which resulted in her landing on the ground in the past 6 months.       Number of Personal Factors/Comorbidities that affect the Plan of Care: 3+: HIGH COMPLEXITY   EXAMINATION:   Most Recent Physical Functioning:   Gross Assessment:  AROM: Generally decreased, functional  PROM: Generally decreased, functional  Strength: Generally decreased, functional  Coordination: Generally decreased, functional               Posture:  Posture (WDL): Exceptions to WDL  Posture Assessment: Forward head  Balance:  Sitting: Intact  Standing: Impaired  Standing - Static: Fair  Standing - Dynamic : Fair Bed Mobility:     Wheelchair Mobility:     Transfers:  Sit to Stand: Contact guard assistance  Stand to Sit: Contact guard assistance  Bed to Chair: Contact guard assistance  Gait:     Base of Support: Center of gravity altered; Widened  Speed/Nicole: Slow;Shuffled;Pace decreased (<100 feet/min)  Gait Abnormalities: Decreased step clearance; Path deviations  Distance (ft): 100 Feet (ft)  Assistive Device: Brace/Splint;Cane, straight  Ambulation - Level of Assistance: Contact guard assistance  Interventions: Manual cues; Safety awareness training      Body Structures Involved:  1. Nerves  2. Bones  3. Joints  4. Muscles  5. Ligaments Body Functions Affected:  1. Sensory/Pain  2. Neuromusculoskeletal  3. Movement Related Activities and Participation Affected:  1. Mobility  2. Self Care  3. Domestic Life  4. Interpersonal Interactions and Relationships  5. Community, Social and [place]   Number of elements that affect the Plan of Care: 4+: HIGH COMPLEXITY   CLINICAL PRESENTATION:   Presentation: Evolving clinical presentation with changing clinical characteristics: MODERATE COMPLEXITY   CLINICAL DECISION MAKIN [facility] Short Form  How much difficulty does the patient currently have. .. Unable A Lot A Little None   1. Turning over in bed (including adjusting bedclothes, sheets and blankets)? [] 1   [] 2   [x] 3   [] 4   2. Sitting down on and standing up from a chair with arms ( e.g., wheelchair, bedside commode, etc.)   [] 1   [] 2   [x] 3   [] 4   3. Moving from lying on back to sitting on the side of the bed?    [] 1   [] 2   [x] 3 [] 4   How much help from another person does the patient currently need. .. Total A Lot A Little None   4. Moving to and from a bed to a chair (including a wheelchair)? [] 1   [] 2   [x] 3   [] 4   5. Need to walk in hospital room? [] 1   [] 2   [x] 3   [] 4   6. Climbing 3-5 steps with a railing? [] 1   [x] 2   [] 3   [] 4   © 2007, Trustees of [address] [place] [address] [address] [id number], under license to [employer]. All rights reserved      Score:  Initial: 17 Most Recent: X (Date: -- )    Interpretation of Tool:  Represents activities that are increasingly more difficult (i.e. Bed mobility, Transfers, Gait). Medical Necessity:     · Patient demonstrates   · excellent  ·  rehab potential due to higher previous functional level. Reason for Services/Other Comments:  · Patient   · continues to require modification of therapeutic interventions to increase complexity of exercises  · . Use of outcome tool(s) and clinical judgement create a POC that gives a: Questionable prediction of patient's progress: MODERATE COMPLEXITY            TREATMENT:   (In addition to Assessment/Re-Assessment sessions the following treatments were rendered)   Pre-treatment Symptoms/Complaints:  L shoulderp ain  Pain: Initial:   Pain Intensity 1: 0  Post Session:  3/10 once positioned in recliner with support     Gait Training ( 30 minutes):  Gait training to improve and/or restore physical functioning as related to mobility, strength, and balance. Ambulated 100 Feet (ft) with Contact guard assistance using a Brace/Splint;Cane, straight and minimal Manual cues; Safety awareness training related to their stance phase and pelvis position and motion to promote proper body alignment, promote proper body posture, and promote proper body mechanics. Therapeutic Activity: (    8 minutes): Therapeutic activities including Chair transfers to improve mobility, strength and balance. Required minimal Manual cues; Safety awareness training to promote static and dynamic balance in standing. Braces/Orthotics/Lines/Etc:   · O2 Room Air  · Sling/cast applied to LUE  Treatment/Session Assessment:    · Response to Treatment:  patient with increased pain with mobility, improved at rest.  · Interdisciplinary Collaboration:   o Physical Therapist  o Registered Nurse  · After treatment position/precautions:   o Up in chair  o Bed/Chair-wheels locked  o Bed in low position  o Call light within reach  o RN notified  o Family at bedside   · Compliance with Program/Exercises: Will assess as treatment progresses  · Recommendations/Intent for next treatment session: \"Next visit will focus on advancements to more challenging activities and reduction in assistance provided\".   Total Treatment Duration:  PT Patient Time In/Time Out  Time In: 1103  Time Out: 1142    Dino Phillips, PT, DPT

## 2020-10-14 NOTE — PROGRESS NOTES
Problem: Patient Education: Go to Patient Education Activity  Goal: Patient/Family Education  Outcome: Progressing Towards Goal  Note:   1. Patient will complete total body bathing and dressing with modified independence and adaptive dressing techniques as needed. 2. Patient will don/doff LUE sling with modified independence. 3. Patient will maintain NWB status to LUE for entire treatment session with no cues from therapist.    4. Patient will complete functional mobility of household distances with modified independence. 5. Patient will complete grooming/hygiene with modified independence. Timeframe: 7 visits        OCCUPATIONAL THERAPY: Initial Assessment, Daily Note, and PM 10/14/2020  INPATIENT: OT Visit Days: 1  [place] / [place] / Product Type: Medicare /      NAME/AGE/GENDER: Sharyle Cogan is a 69 y.o. female   PRIMARY DIAGNOSIS:  Humerus fracture [S42.309A] Closed fracture of left proximal humerus Closed fracture of left proximal humerus  Procedure(s) (LRB):  LEFT HUMERUS OPEN REDUCTION INTERNAL FIXATION (Left)  Day of Surgery  ICD-10: Treatment Diagnosis:    Pain in Left Shoulder (M25.512)  Stiffness of Left Shoulder, Not elsewhere classified (M25.612)  Generalized Muscle Weakness (M62.81)   Precautions/Allergies:    Fall precautions    No formal restrictions from ortho, however will keep LUE NWB and LUE in sling while still under nerve block Oxycodone and Sulfa (sulfonamide antibiotics)      ASSESSMENT:     Ms. Helga Hassan is a 69 y.o. female admitted after fall with L proximal humerus fx s/p L ORIF. No formal LUE restrictions issued from ortho. Pt reports no ROM restrictions per ortho. Will maintain NWB LUE and recommended to pt to keep LUE in sling while nerve block still wearing off to preserve shoulder joint and protect limb, as sesation impaired and LUE hanging heavily, no proximal UE activation.  At baseline pt lives with spouse and reports independence to modified independence with ADLs, IADLs, ambulation, driving. Uses rollator on bad days (hx LBP, R LAURA, L TKA). No falls but balance deficits with hx tripping. Upon arrival pt alert and agreeable to OT evaluation and treatment. BUE assessment revealed AROM and strength grossly impaired in LUE as pt under nerve block. Pt with generally decreased but functional B  strength. Pt educated on protecting LUE with sling, MaxA to don sling. Pt practiced functional transfers with SBA, ambulation for ADLs with CGA progressing to SBA. Practiced tub shower transfer with CGA-Letitia. Pt educated on home safety considerations and compensatory techniques. Left seated in chair with call bell within reach. Pt presents with deficits in strength, activity tolerance, balance, ambulation and transfers. Castro Echavarria is currently functioning below baseline and would benefit from continued OT to increase safety and independence with ADLs. Will follow. This section established at most recent assessment   PROBLEM LIST (Impairments causing functional limitations):  Decreased Strength  Decreased ADL/Functional Activities  Decreased Transfer Abilities  Decreased Ambulation Ability/Technique  Decreased Balance  Decreased Flexibility/Joint Mobility  Decreased Knowledge of Precautions  Decreased Arvilla with Home Exercise Program   INTERVENTIONS PLANNED: (Benefits and precautions of occupational therapy have been discussed with the patient.)  Activities of daily living training  Adaptive equipment training  Balance training  Clothing management  Community reintergration  Donning&doffing training  Jovani tech training  Hygiene training  Neuromuscular re-eduation  Re-evaluation  Therapeutic activity  Therapeutic exercise     TREATMENT PLAN: Frequency/Duration: Follow patient 3x/week to address above goals.   Rehabilitation Potential For Stated Goals: Good     REHAB RECOMMENDATIONS (at time of discharge pending progress): Placement: It is my opinion, based on this patient's performance to date, that Ms. Tash Hua may benefit from [address] after discharge due to the functional deficits listed above that are likely to improve with skilled rehabilitation because he/she has multiple medical issues that affect his/her functional mobility in the community. Equipment:   None at this time              OCCUPATIONAL PROFILE AND HISTORY:   History of Present Injury/Illness (Reason for Referral):  \"Akosua Carbajal is a 69 y.o. female with a past medical history of GERD, hypothyroidism who presents to the ER after a fall at home from tripping on a mat. She landed on her L arm and had immediate severe pain in her L shoulder. She came to the ER and was diagnosed with proximal L humeral fracture and was was discharged. However, after waiting in the ER lobby to be picked up by her , she attempted to ambulate to her car, the patient reports that she felt very lightheaded and dizzy, and then lost consciousness. Per staff, she was unconscious for about 15 seconds and had some rhythmic shaking movements during this time. The patient reports feeling fine now, admitting to improvement of pain after IV morphine. \"  Past Medical History/Comorbidities:   Ms. Tash Hua  has a past medical history of Arthritis, Chronic pain, DDD (degenerative disc disease), GERD (gastroesophageal reflux disease), Psychiatric disorder, and Thyroid disease.  She also has no past medical history of Adverse effect of anesthesia, Aneurysm ([address] Arrhythmia, Asthma, Autoimmune disease ([address] CAD (coronary artery disease), Cancer ([address] Chronic kidney disease, Chronic obstructive pulmonary disease ([address] Coagulation disorder ([address] Diabetes ([address] Difficult intubation, Heart failure ([address] Hypertension, Liver disease, Malignant hyperthermia due to anesthesia, Morbid obesity ([address] Nausea & vomiting, Other unknown and unspecified cause of morbidity or mortality, Pseudocholinesterase deficiency, PUD (peptic ulcer disease), Seizures ([facility] Stroke ([facility] Thromboembolus ([facility] or Unspecified sleep apnea. Ms. Lion Doherty  has a past surgical history that includes hx hip replacement (Right, 04/2014). Social History/Living Environment:   Home Environment: Private residence  # Steps to Enter: 3  One/Two Story Residence: One story  Living Alone: No  Support Systems: Spouse/Significant Other/Partner  Patient Expects to be Discharged to[de-identified] Private residence  Current DME Used/Available at Home: Maira Buys, rollator, Commode, bedside, Shower chair, Cane, straight  Tub or Shower Type: Tub/Shower combination  Prior Level of Function/Work/Activity:  At baseline pt lives with spouse and reports independence to modified independence with ADLs, IADLs, ambulation, driving. Uses rollator on bad days (hx LBP, R LAURA, L TKA). No falls but balance deficits with hx tripping. Dominant Side:         RIGHT    Personal Factors:          Sex:  female        Age:  69 y.o. Other factors that influence how disability is experienced by the patient:  Multiple co-morbidities   Number of Personal Factors/Comorbidities that affect the Plan of Care: Expanded review of therapy/medical records (1-2):  MODERATE COMPLEXITY   ASSESSMENT OF OCCUPATIONAL PERFORMANCE[de-identified]   Activities of Daily Living:   Basic ADLs (From Assessment) Complex ADLs (From Assessment)   Feeding: Setup  Oral Facial Hygiene/Grooming: Setup  Bathing: Minimum assistance  Upper Body Dressing: Supervision  Lower Body Dressing: Stand-by assistance  Toileting: Stand by assistance Instrumental ADL  Meal Preparation: Maximum assistance  Homemaking:  Total assistance   Grooming/Bathing/Dressing Activities of Daily Living     Cognitive Retraining  Safety/Judgement: Fall prevention                 Functional Transfers  Bathroom Mobility: Stand-by assistance  Tub Transfer: Contact guard assistance     Bed/Mat Mobility  Sit to Stand: Stand-by assistance  Stand to Sit: Stand-by assistance  Bed to Chair: Stand-by assistance  Scooting: Stand-by assistance     Most Recent Physical Functioning:   Gross Assessment:  AROM: Grossly decreased, non-functional(LUE )  Strength: Grossly decreased, non-functional(LUE)  Coordination: Generally decreased, functional(LUE)  Sensation: Impaired(LUE to light touch)               Posture:  Posture (WDL): Exceptions to WDL  Posture Assessment:  Forward head  Balance:  Sitting: Intact  Standing: Impaired  Standing - Static: Fair  Standing - Dynamic : Fair Bed Mobility:  Scooting: Stand-by assistance  Wheelchair Mobility:     Transfers:  Sit to Stand: Stand-by assistance  Stand to Sit: Stand-by assistance  Bed to Chair: Stand-by assistance            Patient Vitals for the past 6 hrs:   BP BP Patient Position SpO2 O2 Flow Rate (L/min) Pulse   10/14/20 0826 (!) 144/75 -- 98 % -- 80   10/14/20 0831 (!) 154/80 -- 97 % -- 82   10/14/20 0836 (!) 156/84 -- 99 % -- 75   10/14/20 0841 (!) 166/81 -- 98 % -- 87   10/14/20 0846 (!) 167/77 -- 99 % -- 85   10/14/20 0852 (!) 173/79 At rest 99 % 3 l/min 75   10/14/20 0857 (!) 176/75 -- 99 % -- 73   10/14/20 1121 (!) 151/81 At rest 100 % -- 79       Mental Status  Neurologic State: Alert  Orientation Level: Oriented X4  Cognition: Appropriate for age attention/concentration, Follows commands  Perception: Appears intact  Perseveration: No perseveration noted  Safety/Judgement: Fall prevention                          Physical Skills Involved:  Range of Motion  Balance  Strength  Activity Tolerance  Sensation  Fine Motor Control  Gross Motor Control  Pain (acute) Cognitive Skills Affected (resulting in the inability to perform in a timely and safe manner):  None Psychosocial Skills Affected:  Habits/Routines  Environmental Adaptation  Social Interaction  Emotional Regulation  Self-Awareness  Awareness of Others  Social Roles   Number of elements that affect the Plan of Care: 5+:  HIGH COMPLEXITY   CLINICAL DECISION MAKIN[address] [place] [address] AM-PAC 6 Clicks   Daily Activity Inpatient Short Form  How much help from another person does the patient currently need. .. Total A Lot A Little None   1. Putting on and taking off regular lower body clothing? [] 1   [] 2   [x] 3   [] 4   2. Bathing (including washing, rinsing, drying)? [] 1   [] 2   [x] 3   [] 4   3. Toileting, which includes using toilet, bedpan or urinal?   [] 1   [] 2   [x] 3   [] 4   4. Putting on and taking off regular upper body clothing? [] 1   [] 2   [x] 3   [] 4   5. Taking care of personal grooming such as brushing teeth? [] 1   [] 2   [x] 3   [] 4   6. Eating meals? [] 1   [] 2   [x] 3   [] 4   © , Trustees of [address] [place] [address], under license to [employer]. All rights reserved      Score:  Initial: 18 10/14/2020 Most Recent: X (Date: -- )    Interpretation of Tool:  Represents activities that are increasingly more difficult (i.e. Bed mobility, Transfers, Gait). Medical Necessity:     Patient demonstrates   good   rehab potential due to higher previous functional level. Reason for Services/Other Comments:  Patient continues to require skilled intervention due to   Inability to complete ADLs at prior level of independence  . Use of outcome tool(s) and clinical judgement create a POC that gives a: LOW COMPLEXITY         TREATMENT:   (In addition to Assessment/Re-Assessment sessions the following treatments were rendered)     Pre-treatment Symptoms/Complaints:    Pain: Initial:   Pain Intensity 1: 0  Post Session:  same     Self Care: (8 minutes): Procedure(s) (per grid) utilized to improve and/or restore self-care/home management as related to bathing and donning LUE sling . Required minimal to maximal visual, verbal, manual, and tactile cueing to facilitate activities of daily living skills and compensatory activities.     Braces/Orthotics/Lines/Etc:   LUE in sling   O2 Device: Room air  Treatment/Session Assessment: Response to Treatment:  Tolerated well   Interdisciplinary Collaboration:   Occupational Therapist    After treatment position/precautions:   Up in chair  Bed/Chair-wheels locked  Bed in low position  Call light within reach  Family at bedside   Compliance with Program/Exercises: Compliant all of the time, Will assess as treatment progresses. Recommendations/Intent for next treatment session: \"Next visit will focus on advancements to more challenging activities and reduction in assistance provided\".   Total Treatment Duration:  OT Patient Time In/Time Out  Time In: 1344  Time Out: [facility], OTR/L

## 2020-10-14 NOTE — PROGRESS NOTES
Problem: Falls - Risk of  Goal: *Absence of Falls  Description: Document Jake Rahman Fall Risk and appropriate interventions in the flowsheet.   Outcome: Progressing Towards Goal  Note: Fall Risk Interventions:  Mobility Interventions: Bed/chair exit alarm, Communicate number of staff needed for ambulation/transfer, OT consult for ADLs, PT Consult for mobility concerns    Mentation Interventions: Bed/chair exit alarm, Adequate sleep, hydration, pain control, Reorient patient, Toileting rounds, Update white board    Medication Interventions: Bed/chair exit alarm, Evaluate medications/consider consulting pharmacy, Patient to call before getting OOB, Teach patient to arise slowly    Elimination Interventions: Bed/chair exit alarm, Call light in reach, Patient to call for help with toileting needs, Toileting schedule/hourly rounds    History of Falls Interventions: Bed/chair exit alarm, Consult care management for discharge planning, Door open when patient unattended, Evaluate medications/consider consulting pharmacy         Problem: Pain  Goal: *Control of Pain  Outcome: Progressing Towards Goal

## 2020-10-14 NOTE — PERIOP NOTES
TRANSFER - IN REPORT:    Verbal report received from   [facility] on Jaret Hernandez  being received from [phone] for routine progression of care      Report consisted of patients Situation, Background, Assessment and   Recommendations(SBAR). Information from the following report(s) SBAR was reviewed with the receiving nurse. Opportunity for questions and clarification was provided. Assessment to be completed upon patients arrival to unit and care to be assumed.

## 2020-10-14 NOTE — INTERVAL H&P NOTE
Update History & Physical    The Patient's History and Physical of 12/2020 was reviewed with the patient and I examined the patient. There was no change. The surgical site was confirmed by the patient and me. Plan:  The risk, benefits, expected outcome, and alternative to the recommended procedure have been discussed with the patient. Patient understands and wants to proceed with open treatment of left proximal humerus fracture with intramedullary nail fixation.     Electronically signed by Gianna Pedersen MD on 10/14/2020 at 6:59 AM

## 2020-10-14 NOTE — DISCHARGE SUMMARY
Hospitalist Discharge Summary     Patient ID:  Sundra Mcardle  [id number]  69 y.o.  1950  Admit date: 10/12/2020  1:28 PM  Discharge date and time: 10/14/2020  Attending: No att. providers found  PCP:  None  Treatment Team: Consulting Provider: Capri Young MD; Utilization Review: Live Durant; Care Manager: Stcay Winslow RN; Consulting Provider: Yanely Kasper MD; Physical Therapist: Yannick Medellin, PT, DPT; Occupational Therapist: Francesca Escobar OTR/L    Principal Diagnosis Closed fracture of left proximal humerus   Principal Problem:    Closed fracture of left proximal humerus (10/10/2020)    Active Problems:    Osteoarthritis (10/1/2015)      Syncope (10/11/2020)       Rima May a 69 y. o. female with a past medical history of GERD, hypothyroidism who presents to the ER after a fall at home from tripping on a mat. She landed on her L arm and had immediate severe pain in her L shoulder. She came to the ER and was diagnosed with proximal L humeral fracture and was was discharged. However, after waiting in the ER lobby to be picked up by her , she attempted to ambulate to her car, the patient reports that she felt very lightheaded and dizzy, and then lost consciousness. Per staff, she was unconscious for about 15 seconds and had some rhythmic shaking movements during this time. The patient reports feeling fine now, admitting to improvement of pain after IV morphine.  .     Interval History (10/14): patient examined at bedside following surgery. No acute events. Does not want to stay in hospital and wants to go home \"so I can get some sleep in my own bed. \" No fevers/chills, chest pain, shortness of breath. Has numbness/tingling in left arm that and \"I'm getting feeling back overall. \"     Hospital Course:  Please refer to the admission H&P for details of presentation. In summary, the patient is admitted after fall at home with left humerus fracture.  Orthopedic surgery consulted and patient underwent surgical procedure in the OR without apparent complication. Patient wishes to go home on same day of surgery. Patient with prior syncopal event as detailed in H&P, evaluated by neurology with no recommendations for further neurologic workup. Discussed with orthopedic surgery who are ok with it and recommend aspirin for DVT prophylaxis and outpatient follow-up. Patient currently hemodynamically stable for hospital discharge. Details of hospitalization discussed with patient who understands and agrees with plan of care and discharge home. Return precautions provided. All questions answered. Significant Diagnostic Studies:     EXAM: CT head without contrast.  INDICATION: Syncopal episode. COMPARISON: None.     Multiple axial images obtained through the brain without intravenous contrast.   Radiation dose reduction techniques were used for this study: All CT scans  performed at this facility use one or all of the following: Automated exposure  control, adjustment of the mA and/or kVp according to patient's size, iterative  reconstruction.     FINDINGS:     No evidence of intracranial mass, hemorrhage, or large territorial infarct. The  ventricles are normal in size and position. The basal cisterns are patent. No  extra-axial fluid collection or mass effect.      The orbital contents are within normal limits. The paranasal sinuses are clear. The mastoid air cells and middle ears are clear. No significant osseous or  extracranial soft tissue lesions.        IMPRESSION  IMPRESSION:  1. No evidence of acute intracranial abnormality. Exam: MRI brain without contrast.  Indication: Syncopal episode and seizure like activity. History of fall. Comparison: Head CT dated October 10, 2020.   Contrast: None.     Standard MRI sequences were obtained through the brain in multiple planes  without contrast.     FINDINGS:  Multifocal periventricular and centrum semiovale without T2/FLAIR white matter  hyperintensities most indicative of chronic microvascular ischemic change. The  ventricles are normal in size and position. There is no evidence of acute or  chronic infarct, intracranial hematoma, extraaxial fluid collection, or mass  effect. There is no cerebellar tonsillar herniation. Expected arterial  flow-voids are present.     The paranasal sinuses, mastoid air cells, and middle ears are clear. The orbital  contents are within normal limits. No significant osseous or scalp lesions are  identified.        IMPRESSION  IMPRESSION:  1. No evidence of acute intracranial abnormality. Chronic changes as above. Labs: Results:       Chemistry Recent Labs     10/13/20  0605   *      K 4.0   *   CO2 28   BUN 14   CREA 0.87   CA 8.5   AGAP 5*      CBC w/Diff No results for input(s): WBC, RBC, HGB, HCT, PLT, GRANS, LYMPH, EOS, HGBEXT, HCTEXT, PLTEXT in the last 72 hours. Cardiac Enzymes No results for input(s): CPK, CKND1, AGATA in the last 72 hours. No lab exists for component: CKRMB, TROIP   Coagulation No results for input(s): PTP, INR, APTT, INREXT in the last 72 hours. Lipid Panel No results found for: CHOL, CHOLPOCT, CHOLX, CHLST, CHOLV, 764907, HDL, HDLP, LDL, LDLC, DLDLP, 256392, VLDLC, VLDL, TGLX, TRIGL, TRIGP, TGLPOCT, CHHD, CHHDX   BNP No results for input(s): BNPP in the last 72 hours. Liver Enzymes No results for input(s): TP, ALB, TBIL, AP in the last 72 hours. No lab exists for component: SGOT, GPT, DBIL   Thyroid Studies No results found for: T4, T3U, TSH, TSHEXT         Discharge Exam:  Visit Vitals  BP (!) 151/81 (BP 1 Location: Right arm, BP Patient Position: At rest)   Pulse 79   Temp 98.2 °F (36.8 °C)   Resp 18   SpO2 100%     General appearance: alert, cooperative, no distress, appears stated age  Lungs: clear to auscultation bilaterally  Heart: regular rate and rhythm, S1, S2 normal, no murmur, click, rub or gallop  Abdomen: soft, non-tender.  Bowel sounds normal. No masses,  no organomegaly  Extremities: no cyanosis or edema. Left shoulder in sling with bandaging in place without active bleeding and drainage  Neurologic: Grossly normal    Disposition: home  Discharge Condition: stable  Patient Instructions:   Discharge Medication List as of 10/14/2020  3:10 PM      START taking these medications    Details   aspirin delayed-release 325 mg tablet Take 1 Tab by mouth daily for 30 days. , Print, Disp-30 Tab,R-0         CONTINUE these medications which have CHANGED    Details   tapentadoL (Nucynta) 50 mg tablet Take 50 mg by mouth every six (6) hours as needed for Pain for up to 3 days. Max Daily Amount: 200 mg., Print, Disp-12 Tab,R-0         CONTINUE these medications which have NOT CHANGED    Details   hydrALAZINE (APRESOLINE) 10 mg tablet Take 1 Tab by mouth every eight (8) hours as needed (systolic greater than 922). , No Print, Disp-1 Tab,R-0      ondansetron (ZOFRAN) 4 mg/2 mL soln 2 mL by IntraVENous route every six (6) hours as needed for Nausea., No Print, Disp-1 mL,R-0      tiZANidine (ZANAFLEX) 6 mg capsule Take 5 mg by mouth every six (6) hours. , Historical Med      multivitamin (ONE A DAY) tablet Take 1 Tab by mouth daily. , Historical Med      ranitidine (ZANTAC) 150 mg tablet Take 150 mg by mouth daily. Take / use AM day of surgery  per anesthesia protocols. Indications: GASTROESOPHAGEAL REFLUX, Historical Med      acetaminophen (TYLENOL) 325 mg tablet Take 500 mg by mouth two (2) times a day. Indications: PAIN, Historical Med      diphenhydrAMINE (BENADRYL) 25 mg capsule Take 25 mg by mouth nightly as needed for Sleep.  Indications: INSOMNIA, Historical Med         STOP taking these medications       enoxaparin (LOVENOX) 40 mg/0.4 mL Comments:   Reason for Stopping:         morphine 2 mg/mL injection Comments:   Reason for Stopping:         meloxicam (Mobic) 15 mg tablet Comments:   Reason for Stopping:         naproxen sodium (ALEVE) 220 mg tablet Comments:   Reason for Stopping:         diazepam (VALIUM) 5 mg tablet Comments:   Reason for Stopping:               Activity: PT/OT per Home Health  Diet: Resume previous diet  Wound Care: As directed    Follow-up  ·   With PCP within 1 week. With orthopedic surgery in 2 weeks or as previously scheduled. Time spent to discharge patient 35 minutes  Signed:   Alfredo Sosa DO  10/14/2020  7:30 PM

## 2020-10-14 NOTE — PROGRESS NOTES
Discharge instructions and prescriptions provided and explained to the pt. Opportunity for questions provided. Instructed to call once ready to leave.

## 2020-10-14 NOTE — PERIOP NOTES
TRANSFER - OUT REPORT:    Verbal report given to Alec Hazel RN on [employer]  being transferred to [address] [place] [address] for routine post - op       Report consisted of patients Situation, Background, Assessment and   Recommendations(SBAR). Information from the following report(s) SBAR, OR Summary, Procedure Summary, Intake/Output, MAR and Recent Results was reviewed with the receiving nurse. Lines:   Peripheral IV 10/10/20 Right Hand (Active)   Site Assessment Clean, dry, & intact 10/14/20 0808   Phlebitis Assessment 0 10/14/20 0808   Infiltration Assessment 0 10/14/20 0808   Dressing Status Clean, dry, & intact 10/14/20 0808   Dressing Type Tape;Transparent 10/14/20 0808   Hub Color/Line Status Patent 10/14/20 043        Opportunity for questions and clarification was provided. Patient transported with:   O2 @ 3 liters    VTE prophylaxis orders have been written for [employer]. Patient and family given floor number and nurses name. Family updated re: pt status after security code verified.

## 2020-10-14 NOTE — OP NOTES
Operative Report    Patient: Mary Keita MRN: [id number]  SSN: [id number]    YOB: 1950  Age: 69 y.o. Sex: female       Date of Surgery: 10/14/2020     History:  Mary Keita is a 69 y.o. female who fell from a standing height landed on her left upper extremity. She was seen in emergency room and found to have a left surgical neck 2 part displaced proximal humerus fracture. She was admitted for pain control. I talked to the patient and/or their representative and explained the exact nature the procedure. I also went through a detailed list of the material risks associated with  the procedure which included risk of bleeding, infection, injury to nearby structures, worsening the situation, as well as the risks associate with anesthesia and finally death. Also talked with him regarding the benefits and alternatives to the procedure. Preoperative Diagnosis: LEFT HUMERUS FX     Postoperative Diagnosis: Left closed displaced proximal humerus fracture     Surgeon(s) and Role:     * Alice Lee MD - Primary    Anesthesia: General     Procedure: Procedure(s):  Open treatment of left 2 part surgical neck proximal humerus fracture with intramedullary nail fixation    Procedure in Detail: After the successful induction of general anesthetic as well as a regional block for postoperative analgesia the left upper extremity was prepped and draped in usual sterile fashion. Then made a small incision off the anterolateral aspect of the acromion and placed a guidewire in the center of the humeral head on the lateral projection and just off the lateral aspect of the articular surface on the AP projection.   I then open the proximal humerus with a cannulated awl and then placed a Synthes short spiral blade humeral nail into the humeral head and then with traction and lateral fracture held the fracture reduced while I placed the nail across the fracture site into the shaft portion of the fracture. Once the nail was in appropriate position I placed a guidewire in the center of the humeral head on both the AP and lateral projection. Once this was in appropriate position I then drilled the lateral cortex and placed a 38 mm spiral blade. Once the plate was impacted in place I then placed 2 distal interlock bolts using the outrigger device and then placed a 0 end cap in the proximal portion of the nail to lock the nail to the spiral blade. That point remove the insertion handle and checked the final reduction as well as a place of my hardware was very pleased with this. I then closed incision with two-point 0 Monocryl and staples dressings were applied. The patient was awakened and taken to recovery room in stable condition      Estimated Blood Loss: 60 cc    Tourniquet Time: * No tourniquets in log *      Implants:   Implant Name Type Inv. Item Serial No.  Lot No. LRB No. Used Action   NAIL IM L150MM DIA7MM UNIV PROX HUM DAMIEN TI ZELDA FREDERICK RND - [id number]  NAIL IM L150MM DIA7MM UNIV PROX HUM DAMIEN TI ZELDA FREDERICK RNARABELLA  DEPUY [employer] [place]_ [id number] Left 1 Implanted               Specimens: * No specimens in log *        Drains: None                Complications: None    Counts: Sponge and needle counts were correct times two.     Signed By:  Nathan Boothe MD     October 14, 2020

## 2020-10-14 NOTE — PROGRESS NOTES
TRANSFER - OUT REPORT:    Verbal report given to Alisha Schuler RN(name) on [employer]  being transferred to PREOP(unit) for ordered procedure       Report consisted of patients Situation, Background, Assessment and   Recommendations(SBAR). Information from the following report(s) SBAR, Kardex, Intake/Output, MAR and Recent Results was reviewed with the receiving nurse. Lines:   Peripheral IV 10/10/20 Right Hand (Active)   Site Assessment Clean, dry, & intact 10/13/20 2030   Phlebitis Assessment 0 10/13/20 2030   Infiltration Assessment 0 10/13/20 2030   Dressing Status Clean, dry, & intact 10/13/20 2030   Dressing Type Tape;Transparent 10/13/20 2030   Hub Color/Line Status Flushed;Patent 10/13/20 2117        Opportunity for questions and clarification was provided. Patient transported with:   Registered Nurse     Instructed to give pepcid before bringing patient down.

## 2020-10-15 ENCOUNTER — PATIENT OUTREACH (OUTPATIENT)
Dept: CASE MANAGEMENT | Age: 70
End: 2020-10-15

## 2020-10-15 NOTE — PROGRESS NOTES
Daily Note     Today's date: 2018  Patient name: Vladimir Seymour  : 1950  MRN: [id number]  Referring provider: Milli Agrawal MD  Dx:   Encounter Diagnosis     ICD-10-CM    1  Lymphedema I89 0                   Subjective: ***      Objective: See treatment diary below      Assessment: Tolerated treatment {Tolerated treatment :[id number]}   Patient {assessment:[id number]}      Plan: {PLAN:[id number]} Initial HILDA outreach attempt to was unsuccessful. Left message. Will attempt second outreach within 24 hours.

## 2020-10-15 NOTE — PROGRESS NOTES
Transition of Care Hospital Discharge Follow-Up      Date/Time:  10/15/2020 2:39 PM    Patient was admitted to [facility] on 10/10/20 and discharged on 10/14/20 for Closed fracture of left proximal humerus; s/p ORIF. The physician discharge summary was available at the time of outreach. Patient was contacted within 1 business days of discharge. Inpatient RUR score: 7   Was this a readmission? no   Patient stated reason for the readmission: n/a  Patients top risk factors for readmission: complications of surgery      LPN Care Coordinator contacted the patient by telephone to perform post hospital discharge assessment. Verified name and  with patient as identifiers. Provided introduction to self, and explanation of the Care Coordinator role. Patient received hospital discharge instructions. LPN reviewed discharge instructions and red flags with patient who verbalized understanding. Patient given an opportunity to ask questions and does not have any further questions or concerns at this time. The patient agrees to contact the PCP office for questions related to their healthcare. LPN provided contact information for future reference. Home Health orders at discharge: [address]: [facility]  Date of initial or scheduled visit: 10/16/20  (Assist with coordination of services if necessary.)    Durable Medical Equipment ordered at discharge: none  [facility]: [address] received: n/a  (Assist patient in obtaining DME orders &/or equipment if necessary.)    Medication(s):   Medication review was performed with patient, who verbalizes understanding of administration of home medications. There were no barriers to obtaining medications identified at this time. Current Outpatient Medications   Medication Sig    tapentadoL (Nucynta) 50 mg tablet Take 50 mg by mouth every six (6) hours as needed for Pain for up to 3 days. Max Daily Amount: 200 mg.  aspirin delayed-release 325 mg tablet Take 1 Tab by mouth daily for 30 days.  hydrALAZINE (APRESOLINE) 10 mg tablet Take 1 Tab by mouth every eight (8) hours as needed (systolic greater than 501).  ondansetron (ZOFRAN) 4 mg/2 mL soln 2 mL by IntraVENous route every six (6) hours as needed for Nausea.  tiZANidine (ZANAFLEX) 6 mg capsule Take 5 mg by mouth every six (6) hours.  multivitamin (ONE A DAY) tablet Take 1 Tab by mouth daily.  ranitidine (ZANTAC) 150 mg tablet Take 150 mg by mouth daily. Take / use AM day of surgery  per anesthesia protocols. Indications: GASTROESOPHAGEAL REFLUX    acetaminophen (TYLENOL) 325 mg tablet Take 500 mg by mouth two (2) times a day. Indications: PAIN    diphenhydrAMINE (BENADRYL) 25 mg capsule Take 25 mg by mouth nightly as needed for Sleep. Indications: INSOMNIA     No current facility-administered medications for this visit. There are no discontinued medications. ADL assessment: independent  (If patient is unable to perform ADLs  what is the limiting factor(s)? Do they have a support system that can assist? If no support system is present, discuss possible assistance that they may be able to obtain. Escalate for SW if ongoing issues are verbalized by pt or anticipated)    [facility] follow up appointment(s): No future appointments. Non-[facility] follow up appointment(s): Dr. Carlie Gomez 10/27/20  7 Day follow up with PCP or Specialist: will call as needed  Transportation arranged: no needs at this time     Covid Risk Education    Patient has following risk factors of: no known risk factors. Education provided regarding infection prevention, and signs and symptoms of COVID-19 and when to seek medical attention with patient who verbalized understanding. Discussed exposure protocols and quarantine From CDC: Are you at higher risk for severe illness?  and given an opportunity for questions and concerns.  The patient agrees to contact the COVID-19 hotline [phone] or PCP office for questions related to COVID-19. For more information on steps you can take to protect yourself, see CDC's How to Protect Yourself     Patient/family/caregiver given information for [employer] and agrees to enroll no      Any other questions or concerns expressed by patient? Not at this time    Scheduled next follow up call or referral to CTN/ ACM: Myra Edmonds will follow per workflow guidelines  Next follow up call: within 14 days    Goals Addressed                 This Visit's Progress     Returns to baseline activity level.

## 2020-10-30 ENCOUNTER — PATIENT OUTREACH (OUTPATIENT)
Dept: CASE MANAGEMENT | Age: 70
End: 2020-10-30

## 2020-10-30 NOTE — PROGRESS NOTES
Attempted outreach follow up without success, left message. Patient had follow up with Dr. Ilan Rangel ortho scheduled 10/27/20. No new needs are noted on chart review. Patient will be graduated from [facility] program.  Will reopen if call is returned.

## 2020-11-09 ENCOUNTER — HOSPITAL ENCOUNTER (OUTPATIENT)
Dept: MAMMOGRAPHY | Age: 70
Discharge: HOME | End: 2020-11-09
Attending: INTERNAL MEDICINE

## 2020-11-09 DIAGNOSIS — Z12.31 VISIT FOR SCREENING MAMMOGRAM: ICD-10-CM

## 2020-11-09 PROCEDURE — 77063 BREAST TOMOSYNTHESIS BI: CPT | Performed by: RADIOLOGY

## 2020-11-09 PROCEDURE — 77063 BREAST TOMOSYNTHESIS BI: CPT

## 2020-11-09 PROCEDURE — 3342F MAMMO ASSESS BENGN DOCD: CPT | Performed by: RADIOLOGY

## 2020-11-09 PROCEDURE — 77067 SCR MAMMO BI INCL CAD: CPT | Performed by: RADIOLOGY

## 2020-11-25 ENCOUNTER — APPOINTMENT (OUTPATIENT)
Dept: INTERNAL MEDICINE | Age: 70
End: 2020-11-25

## 2021-01-01 ENCOUNTER — EXTERNAL RECORD (OUTPATIENT)
Dept: OTHER | Age: 71
End: 2021-01-01

## 2021-01-06 ENCOUNTER — OFFICE VISIT (OUTPATIENT)
Dept: INTERNAL MEDICINE | Age: 71
End: 2021-01-06
Attending: INTERNAL MEDICINE

## 2021-01-06 VITALS
HEIGHT: 64 IN | OXYGEN SATURATION: 98 % | TEMPERATURE: 96.2 F | HEART RATE: 84 BPM | BODY MASS INDEX: 39.2 KG/M2 | WEIGHT: 229.6 LBS | SYSTOLIC BLOOD PRESSURE: 122 MMHG | DIASTOLIC BLOOD PRESSURE: 74 MMHG

## 2021-01-06 DIAGNOSIS — Z23 NEED FOR VACCINATION: ICD-10-CM

## 2021-01-06 DIAGNOSIS — E66.01 MORBID OBESITY (CMD): ICD-10-CM

## 2021-01-06 DIAGNOSIS — D58.2 ELEVATED HEMOGLOBIN (CMD): ICD-10-CM

## 2021-01-06 DIAGNOSIS — N18.2 CKD (CHRONIC KIDNEY DISEASE) STAGE 2, GFR 60-89 ML/MIN: Primary | ICD-10-CM

## 2021-01-06 DIAGNOSIS — K22.10 EROSIVE ESOPHAGITIS: ICD-10-CM

## 2021-01-06 DIAGNOSIS — M25.549 PAIN IN MULTIPLE FINGER JOINTS: ICD-10-CM

## 2021-01-06 DIAGNOSIS — R60.0 EDEMA OF BOTH LEGS: ICD-10-CM

## 2021-01-06 DIAGNOSIS — M79.89 LEG SWELLING: ICD-10-CM

## 2021-01-06 DIAGNOSIS — K22.2 GERD WITH STRICTURE: ICD-10-CM

## 2021-01-06 DIAGNOSIS — I89.0 LYMPHEDEMA OF BOTH LOWER EXTREMITIES: ICD-10-CM

## 2021-01-06 DIAGNOSIS — K21.9 GERD WITH STRICTURE: ICD-10-CM

## 2021-01-06 DIAGNOSIS — I10 ESSENTIAL HYPERTENSION: ICD-10-CM

## 2021-01-06 DIAGNOSIS — M17.0 OSTEOARTHRITIS OF BOTH KNEES, UNSPECIFIED OSTEOARTHRITIS TYPE: ICD-10-CM

## 2021-01-06 DIAGNOSIS — I50.9 HEART FAILURE, UNSPECIFIED HF CHRONICITY, UNSPECIFIED HEART FAILURE TYPE (CMD): ICD-10-CM

## 2021-01-06 DIAGNOSIS — M17.0 PRIMARY OSTEOARTHRITIS OF BOTH KNEES: ICD-10-CM

## 2021-01-06 LAB
ALBUMIN SERPL-MCNC: 3.6 G/DL (ref 3.6–5.1)
ALBUMIN/GLOB SERPL: 0.8 {RATIO} (ref 1–2.4)
ALP SERPL-CCNC: 97 UNITS/L (ref 45–117)
ALT SERPL-CCNC: 21 UNITS/L
ANION GAP SERPL CALC-SCNC: 9 MMOL/L (ref 10–20)
AST SERPL-CCNC: 13 UNITS/L
BILIRUB SERPL-MCNC: 0.3 MG/DL (ref 0.2–1)
BUN SERPL-MCNC: 8 MG/DL (ref 6–20)
BUN/CREAT SERPL: 8 (ref 7–25)
CALCIUM SERPL-MCNC: 9.2 MG/DL (ref 8.4–10.2)
CHLORIDE SERPL-SCNC: 108 MMOL/L (ref 98–107)
CHOLEST SERPL-MCNC: 201 MG/DL
CHOLEST/HDLC SERPL: 3.6 {RATIO}
CO2 SERPL-SCNC: 25 MMOL/L (ref 21–32)
CREAT SERPL-MCNC: 0.99 MG/DL (ref 0.51–0.95)
FASTING DURATION TIME PATIENT: ABNORMAL H
FASTING DURATION TIME PATIENT: ABNORMAL H
GFR SERPLBLD BASED ON 1.73 SQ M-ARVRAT: 66 ML/MIN/1.73M2
GLOBULIN SER-MCNC: 4.3 G/DL (ref 2–4)
GLUCOSE SERPL-MCNC: 78 MG/DL (ref 65–99)
HDLC SERPL-MCNC: 56 MG/DL
LDLC SERPL CALC-MCNC: 122 MG/DL
NONHDLC SERPL-MCNC: 145 MG/DL
POTASSIUM SERPL-SCNC: 4.3 MMOL/L (ref 3.4–5.1)
PROT SERPL-MCNC: 7.9 G/DL (ref 6.4–8.2)
SODIUM SERPL-SCNC: 138 MMOL/L (ref 135–145)
TRIGL SERPL-MCNC: 115 MG/DL

## 2021-01-06 PROCEDURE — 80061 LIPID PANEL: CPT | Performed by: INTERNAL MEDICINE

## 2021-01-06 PROCEDURE — 80053 COMPREHEN METABOLIC PANEL: CPT | Performed by: INTERNAL MEDICINE

## 2021-01-06 PROCEDURE — 99211 OFF/OP EST MAY X REQ PHY/QHP: CPT

## 2021-01-06 PROCEDURE — 90662 IIV NO PRSV INCREASED AG IM: CPT | Performed by: INTERNAL MEDICINE

## 2021-01-06 PROCEDURE — 90471 IMMUNIZATION ADMIN: CPT | Performed by: INTERNAL MEDICINE

## 2021-01-06 PROCEDURE — 99214 OFFICE O/P EST MOD 30 MIN: CPT | Performed by: INTERNAL MEDICINE

## 2021-01-06 PROCEDURE — 10002800 HB RX 250 W HCPCS: Performed by: INTERNAL MEDICINE

## 2021-01-06 PROCEDURE — 36415 COLL VENOUS BLD VENIPUNCTURE: CPT | Performed by: INTERNAL MEDICINE

## 2021-01-06 RX ORDER — FUROSEMIDE 20 MG/1
20 TABLET ORAL DAILY PRN
Qty: 90 TABLET | Refills: 0 | Status: SHIPPED | OUTPATIENT
Start: 2021-01-06 | End: 2021-04-07

## 2021-01-06 RX ORDER — VITAMIN B COMPLEX
25 TABLET ORAL DAILY
Qty: 90 TABLET | Refills: 3 | Status: SHIPPED | OUTPATIENT
Start: 2021-01-06 | End: 2021-06-22 | Stop reason: ALTCHOICE

## 2021-01-06 RX ORDER — ACETAMINOPHEN 500 MG
1000 TABLET ORAL 3 TIMES DAILY PRN
Qty: 50 TABLET | Refills: 3 | Status: SHIPPED | OUTPATIENT
Start: 2021-01-06

## 2021-01-06 RX ORDER — IBUPROFEN 200 MG
400 TABLET ORAL EVERY 8 HOURS PRN
Qty: 30 TABLET | Refills: 0 | Status: SHIPPED | OUTPATIENT
Start: 2021-01-06 | End: 2022-07-26 | Stop reason: CLARIF

## 2021-01-06 RX ORDER — PANTOPRAZOLE SODIUM 40 MG/1
40 TABLET, DELAYED RELEASE ORAL DAILY
Qty: 30 TABLET | Refills: 0 | Status: SHIPPED | OUTPATIENT
Start: 2021-01-06 | End: 2022-03-23 | Stop reason: SDUPTHER

## 2021-01-06 RX ADMIN — INFLUENZA A VIRUS A/MICHIGAN/45/2015 X-275 (H1N1) ANTIGEN (FORMALDEHYDE INACTIVATED), INFLUENZA A VIRUS A/SINGAPORE/INFIMH-16-0019/2016 IVR-186 (H3N2) ANTIGEN (FORMALDEHYDE INACTIVATED), INFLUENZA B VIRUS B/PHUKET/3073/2013 ANTIGEN (FORMALDEHYDE INACTIVATED), AND INFLUENZA B VIRUS B/MARYLAND/15/2016 BX-69A ANTIGEN (FORMALDEHYDE INACTIVATED) 0.7 ML: 60; 60; 60; 60 INJECTION, SUSPENSION INTRAMUSCULAR at 12:54

## 2021-01-08 ENCOUNTER — TELEPHONE (OUTPATIENT)
Dept: INTERNAL MEDICINE | Age: 71
End: 2021-01-08

## 2021-01-09 PROBLEM — D58.2 ELEVATED HEMOGLOBIN (CMD): Status: ACTIVE | Noted: 2021-01-09

## 2021-01-09 ASSESSMENT — ENCOUNTER SYMPTOMS
NAUSEA: 0
FEVER: 0
FATIGUE: 0

## 2021-01-11 ENCOUNTER — HOSPITAL ENCOUNTER (OUTPATIENT)
Dept: REHABILITATION | Age: 71
Discharge: STILL ADMITTED OR ADMINISTRATIVE | End: 2021-01-11
Attending: INTERNAL MEDICINE

## 2021-01-11 DIAGNOSIS — I89.0 LYMPHEDEMA OF BOTH LOWER EXTREMITIES: ICD-10-CM

## 2021-01-11 PROCEDURE — 10004138 HB COUNTER EVAL LYMPHEDEMA: Performed by: OCCUPATIONAL THERAPIST

## 2021-01-11 PROCEDURE — 97530 THERAPEUTIC ACTIVITIES: CPT | Performed by: OCCUPATIONAL THERAPIST

## 2021-01-11 PROCEDURE — 97140 MANUAL THERAPY 1/> REGIONS: CPT | Performed by: OCCUPATIONAL THERAPIST

## 2021-01-11 PROCEDURE — 97166 OT EVAL MOD COMPLEX 45 MIN: CPT | Performed by: OCCUPATIONAL THERAPIST

## 2021-01-11 PROCEDURE — 10004172 HB COUNTER-THERAPY VISIT OT: Performed by: OCCUPATIONAL THERAPIST

## 2021-01-14 ENCOUNTER — TELEPHONE (OUTPATIENT)
Dept: INTERNAL MEDICINE | Age: 71
End: 2021-01-14

## 2021-01-19 ENCOUNTER — HOSPITAL ENCOUNTER (OUTPATIENT)
Dept: REHABILITATION | Age: 71
Discharge: STILL ADMITTED OR ADMINISTRATIVE | End: 2021-01-19
Attending: INTERNAL MEDICINE

## 2021-01-19 PROCEDURE — 97140 MANUAL THERAPY 1/> REGIONS: CPT | Performed by: OCCUPATIONAL THERAPIST

## 2021-01-19 PROCEDURE — 10004172 HB COUNTER-THERAPY VISIT OT: Performed by: OCCUPATIONAL THERAPIST

## 2021-01-22 ENCOUNTER — HOSPITAL ENCOUNTER (OUTPATIENT)
Dept: REHABILITATION | Age: 71
Discharge: STILL ADMITTED OR ADMINISTRATIVE | End: 2021-01-22
Attending: INTERNAL MEDICINE

## 2021-01-22 PROCEDURE — 97140 MANUAL THERAPY 1/> REGIONS: CPT | Performed by: OCCUPATIONAL THERAPIST

## 2021-01-22 PROCEDURE — 10004172 HB COUNTER-THERAPY VISIT OT: Performed by: OCCUPATIONAL THERAPIST

## 2021-01-26 ENCOUNTER — TELEPHONE (OUTPATIENT)
Dept: REHABILITATION | Age: 71
End: 2021-01-26

## 2021-01-26 ENCOUNTER — APPOINTMENT (OUTPATIENT)
Dept: REHABILITATION | Age: 71
End: 2021-01-26
Attending: INTERNAL MEDICINE

## 2021-01-28 ENCOUNTER — HOSPITAL ENCOUNTER (OUTPATIENT)
Dept: REHABILITATION | Age: 71
Discharge: STILL ADMITTED OR ADMINISTRATIVE | End: 2021-01-28
Attending: INTERNAL MEDICINE

## 2021-01-28 PROCEDURE — 97140 MANUAL THERAPY 1/> REGIONS: CPT | Performed by: OCCUPATIONAL THERAPIST

## 2021-01-28 PROCEDURE — 10004172 HB COUNTER-THERAPY VISIT OT: Performed by: OCCUPATIONAL THERAPIST

## 2021-02-01 ENCOUNTER — APPOINTMENT (OUTPATIENT)
Dept: REHABILITATION | Age: 71
End: 2021-02-01
Attending: INTERNAL MEDICINE

## 2021-02-04 ENCOUNTER — TELEPHONE (OUTPATIENT)
Dept: REHABILITATION | Age: 71
End: 2021-02-04

## 2021-02-15 ENCOUNTER — HOSPITAL ENCOUNTER (OUTPATIENT)
Dept: CV DIAGNOSTICS | Age: 71
Discharge: HOME | End: 2021-02-15
Attending: INTERNAL MEDICINE

## 2021-02-15 DIAGNOSIS — R60.0 EDEMA OF BOTH LEGS: ICD-10-CM

## 2021-02-15 DIAGNOSIS — I10 ESSENTIAL HYPERTENSION: ICD-10-CM

## 2021-02-15 LAB
AORTIC VALVE AREA: 2.8 CM2
ASCENDING AORTA (AAD): 2.8 CM
AV MEAN GRADIENT (AVMG): 3.9 MMHG
AV PEAK GRADIENT (AVPG): 10 MMHG
AV PEAK VELOCITY (AVPV): 1.6 M/S
DOP CALC LVOT PEAK VEL (LVOTPV): 1.2 M/S
E WAVE DECELARATION TIME (MDT): 233.8 MS
INTERVENTRICULAR SEPTUM IN END DIASTOLE (IVSD): 1.3 CM
LEFT VENTRICULAR INTERNAL DIMENSION IN DIASTOLE (LVDD): 3.6 CM
LEFT VENTRICULAR POSTERIOR WALL IN END DIASTOLE (LVPW): 1.1 CM
LV EF: 60 %
LV END SYSTOLIC LONGITUDINAL STRAIN GLOBAL (LVGS): -21 %
LVOT VTI (LVOTVTI): 25.1 CM
MV E TISSUE VEL LAT (MELV): 9.6 CM/S
MV E TISSUE VEL MED (MESV): 5.7 CM/S
MV E WAVE VEL/E TISSUE VEL MED(MSR): 11.8
MV PEAK A VELOCITY (MVPAV): 0.8 M/S
MV PEAK E VELOCITY (MVPEV): 0.7 M/S
RV TISSUE DOPPLER FREE WALL HEART RATE (RVSTV): 0.2 M/S
TV ESTIMATED RIGHT ARTERIAL PRESSURE (RAP): 3 MMHG

## 2021-02-15 PROCEDURE — 93306 TTE W/DOPPLER COMPLETE: CPT | Performed by: INTERNAL MEDICINE

## 2021-02-15 PROCEDURE — 93356 MYOCRD STRAIN IMG SPCKL TRCK: CPT

## 2021-02-15 PROCEDURE — 93356 MYOCRD STRAIN IMG SPCKL TRCK: CPT | Performed by: INTERNAL MEDICINE

## 2021-02-17 ENCOUNTER — TELEPHONE (OUTPATIENT)
Dept: INTERNAL MEDICINE | Age: 71
End: 2021-02-17

## 2021-04-07 DIAGNOSIS — R60.0 EDEMA OF BOTH LEGS: ICD-10-CM

## 2021-04-07 RX ORDER — FUROSEMIDE 20 MG/1
TABLET ORAL
Qty: 90 TABLET | Refills: 0 | Status: SHIPPED | OUTPATIENT
Start: 2021-04-07 | End: 2021-06-23

## 2021-04-15 ENCOUNTER — TELEPHONE (OUTPATIENT)
Dept: ADMISSION | Age: 71
End: 2021-04-15

## 2021-04-20 DIAGNOSIS — N18.2 CKD (CHRONIC KIDNEY DISEASE) STAGE 2, GFR 60-89 ML/MIN: ICD-10-CM

## 2021-04-20 DIAGNOSIS — I10 ESSENTIAL HYPERTENSION: ICD-10-CM

## 2021-04-20 RX ORDER — LOSARTAN POTASSIUM 25 MG/1
25 TABLET ORAL DAILY
Qty: 90 TABLET | Refills: 0 | Status: SHIPPED | OUTPATIENT
Start: 2021-04-20 | End: 2021-07-23

## 2021-05-24 VITALS
BODY MASS INDEX: 39.18 KG/M2 | HEART RATE: 78 BPM | HEIGHT: 64 IN | SYSTOLIC BLOOD PRESSURE: 134 MMHG | OXYGEN SATURATION: 98 % | DIASTOLIC BLOOD PRESSURE: 77 MMHG | WEIGHT: 229.5 LBS | TEMPERATURE: 96.5 F

## 2021-06-22 ENCOUNTER — OFFICE VISIT (OUTPATIENT)
Dept: OPHTHALMOLOGY | Age: 71
End: 2021-06-22

## 2021-06-22 DIAGNOSIS — H52.03 HYPEROPIA OF BOTH EYES WITH ASTIGMATISM AND PRESBYOPIA: ICD-10-CM

## 2021-06-22 DIAGNOSIS — H52.203 HYPEROPIA OF BOTH EYES WITH ASTIGMATISM AND PRESBYOPIA: ICD-10-CM

## 2021-06-22 DIAGNOSIS — H25.813 COMBINED FORMS OF AGE-RELATED CATARACT OF BOTH EYES: Primary | ICD-10-CM

## 2021-06-22 DIAGNOSIS — H52.4 HYPEROPIA OF BOTH EYES WITH ASTIGMATISM AND PRESBYOPIA: ICD-10-CM

## 2021-06-22 PROCEDURE — 92014 COMPRE OPH EXAM EST PT 1/>: CPT | Performed by: OPTOMETRIST

## 2021-06-22 PROCEDURE — 92015 DETERMINE REFRACTIVE STATE: CPT | Performed by: OPTOMETRIST

## 2021-06-22 ASSESSMENT — REFRACTION_WEARINGRX
OD_CYLINDER: +1.25
OS_ADD: +2.25
OD_ADD: +2.25
OD_SPHERE: +0.75
OD_AXIS: 177
OS_AXIS: 017
OS_SPHERE: +1.00
OS_CYLINDER: +0.50

## 2021-06-22 ASSESSMENT — CUP TO DISC RATIO
OD_RATIO: 0.35
OS_RATIO: 0.35

## 2021-06-22 ASSESSMENT — VISUAL ACUITY
OS_SC: 20/70
OD_SC: 20/50
OD_SC: 20/100
OS_SC: 20/70
METHOD: SNELLEN - LINEAR
OS_SC+: -2
OS_PH_SC: 20/50
OD_PH_SC: 20/40
OD_SC+: +2
OD_PH_SC+: -1

## 2021-06-22 ASSESSMENT — REFRACTION
OS_CYLINDER: +0.75
OS_SPHERE: +0.75
OD_AXIS: 179
OD_CYLINDER: +1.50
OD_ADD: +2.25
OS_ADD: +2.25
OD_SPHERE: PLANO
OS_AXIS: 018

## 2021-06-22 ASSESSMENT — REFRACTION_MANIFEST
OS_AXIS: 019
METHOD_AUTOREFRACTION: 1
OS_CYLINDER: +0.75
OS_SPHERE: +1.00
OD_SPHERE: -0.25
OD_CYLINDER: +1.75
OD_AXIS: 177

## 2021-06-22 ASSESSMENT — TONOMETRY
OS_IOP_MMHG: 16
OD_IOP_MMHG: 14
IOP_METHOD: NON-CONTACT AIR PUFF

## 2021-06-22 ASSESSMENT — EXTERNAL EXAM - LEFT EYE: OS_EXAM: NORMAL

## 2021-06-22 ASSESSMENT — CONF VISUAL FIELD
METHOD: COUNTING FINGERS
OD_NORMAL: 1
OS_NORMAL: 1

## 2021-06-22 ASSESSMENT — SLIT LAMP EXAM - LIDS
COMMENTS: 1+ DERMATOCHALASIS - UPPER LID
COMMENTS: 1+ DERMATOCHALASIS - UPPER LID

## 2021-06-22 ASSESSMENT — EXTERNAL EXAM - RIGHT EYE: OD_EXAM: NORMAL

## 2021-06-23 DIAGNOSIS — R60.0 EDEMA OF BOTH LEGS: ICD-10-CM

## 2021-06-23 RX ORDER — FUROSEMIDE 20 MG/1
TABLET ORAL
Qty: 90 TABLET | Refills: 0 | Status: SHIPPED | OUTPATIENT
Start: 2021-06-23 | End: 2021-12-28 | Stop reason: SDUPTHER

## 2021-06-29 ENCOUNTER — APPOINTMENT (OUTPATIENT)
Dept: ULTRASOUND IMAGING | Age: 71
End: 2021-06-29
Attending: EMERGENCY MEDICINE

## 2021-06-29 ENCOUNTER — APPOINTMENT (OUTPATIENT)
Dept: GENERAL RADIOLOGY | Age: 71
End: 2021-06-29
Attending: EMERGENCY MEDICINE

## 2021-06-29 ENCOUNTER — HOSPITAL ENCOUNTER (EMERGENCY)
Age: 71
Discharge: HOME | End: 2021-06-29
Attending: EMERGENCY MEDICINE

## 2021-06-29 VITALS
OXYGEN SATURATION: 100 % | HEART RATE: 65 BPM | DIASTOLIC BLOOD PRESSURE: 87 MMHG | RESPIRATION RATE: 18 BRPM | SYSTOLIC BLOOD PRESSURE: 172 MMHG | TEMPERATURE: 98.7 F

## 2021-06-29 DIAGNOSIS — M79.672 LEFT FOOT PAIN: Primary | ICD-10-CM

## 2021-06-29 DIAGNOSIS — M77.42 METATARSALGIA OF LEFT FOOT: ICD-10-CM

## 2021-06-29 LAB
ALBUMIN SERPL-MCNC: 3.3 G/DL (ref 3.6–5.1)
ALBUMIN/GLOB SERPL: 0.8 {RATIO} (ref 1–2.4)
ALP SERPL-CCNC: 97 UNITS/L (ref 45–117)
ALT SERPL-CCNC: 16 UNITS/L
ANION GAP BLD CALC-SCNC: 18 MMOL/L (ref 10–20)
ANION GAP SERPL CALC-SCNC: 13 MMOL/L (ref 10–20)
AST SERPL-CCNC: 12 UNITS/L
BASOPHILS # BLD: 0 K/MCL (ref 0–0.3)
BASOPHILS NFR BLD: 0 %
BILIRUB SERPL-MCNC: 0.3 MG/DL (ref 0.2–1)
BUN BLD-MCNC: 9 MG/DL (ref 6–20)
BUN SERPL-MCNC: 10 MG/DL (ref 6–20)
BUN/CREAT SERPL: 12 (ref 7–25)
CA-I BLD-SCNC: 1.3 MMOL/L (ref 1.15–1.29)
CALCIUM SERPL-MCNC: 9.4 MG/DL (ref 8.4–10.2)
CHLORIDE BLD-SCNC: 104 MMOL/L (ref 98–107)
CHLORIDE SERPL-SCNC: 107 MMOL/L (ref 98–107)
CO2 BLD-SCNC: 26 MMOL/L (ref 19–24)
CO2 SERPL-SCNC: 28 MMOL/L (ref 21–32)
CREAT SERPL-MCNC: 0.84 MG/DL (ref 0.51–0.95)
CREAT SERPL-MCNC: 0.9 MG/DL (ref 0.51–0.95)
DEPRECATED RDW RBC: 44.4 FL (ref 39–50)
EOSINOPHIL # BLD: 0.1 K/MCL (ref 0–0.5)
EOSINOPHIL NFR BLD: 1 %
ERYTHROCYTE [DISTWIDTH] IN BLOOD: 13.5 % (ref 11–15)
FASTING DURATION TIME PATIENT: ABNORMAL H
GFR SERPLBLD BASED ON 1.73 SQ M-ARVRAT: 74 ML/MIN/1.73M2
GFR SERPLBLD BASED ON 1.73 SQ M-ARVRAT: 80 ML/MIN/1.73M2
GLOBULIN SER-MCNC: 3.9 G/DL (ref 2–4)
GLUCOSE BLD-MCNC: 94 MG/DL (ref 70–99)
GLUCOSE SERPL-MCNC: 95 MG/DL (ref 65–99)
HCT VFR BLD CALC: 36.8 % (ref 36–46.5)
HCT VFR BLD CALC: 38 % (ref 36–46.5)
HGB BLD CALC-MCNC: 12.9 G/DL (ref 12–15.5)
HGB BLD-MCNC: 11.8 G/DL (ref 12–15.5)
IMM GRANULOCYTES # BLD AUTO: 0 K/MCL (ref 0–0.2)
IMM GRANULOCYTES # BLD: 0 %
LYMPHOCYTES # BLD: 2.4 K/MCL (ref 1–4)
LYMPHOCYTES NFR BLD: 23 %
MCH RBC QN AUTO: 28.5 PG (ref 26–34)
MCHC RBC AUTO-ENTMCNC: 32.1 G/DL (ref 32–36.5)
MCV RBC AUTO: 88.9 FL (ref 78–100)
MONOCYTES # BLD: 0.5 K/MCL (ref 0.3–0.9)
MONOCYTES NFR BLD: 5 %
NEUTROPHILS # BLD: 7.2 K/MCL (ref 1.8–7.7)
NEUTROPHILS NFR BLD: 71 %
NRBC BLD MANUAL-RTO: 0 /100 WBC
PLATELET # BLD AUTO: 309 K/MCL (ref 140–450)
POTASSIUM BLD-SCNC: 3.8 MMOL/L (ref 3.4–5.1)
POTASSIUM SERPL-SCNC: 3.9 MMOL/L (ref 3.4–5.1)
PROT SERPL-MCNC: 7.2 G/DL (ref 6.4–8.2)
RBC # BLD: 4.14 MIL/MCL (ref 4–5.2)
SODIUM BLD-SCNC: 144 MMOL/L (ref 135–145)
SODIUM SERPL-SCNC: 144 MMOL/L (ref 135–145)
WBC # BLD: 10.3 K/MCL (ref 4.2–11)

## 2021-06-29 PROCEDURE — 10004157 US LOWER EXTREMITY VENOUS DUPLEX LEFT

## 2021-06-29 PROCEDURE — 93971 EXTREMITY STUDY: CPT | Performed by: RADIOLOGY

## 2021-06-29 PROCEDURE — 96374 THER/PROPH/DIAG INJ IV PUSH: CPT

## 2021-06-29 PROCEDURE — 99284 EMERGENCY DEPT VISIT MOD MDM: CPT

## 2021-06-29 PROCEDURE — 85025 COMPLETE CBC W/AUTO DIFF WBC: CPT | Performed by: EMERGENCY MEDICINE

## 2021-06-29 PROCEDURE — 36415 COLL VENOUS BLD VENIPUNCTURE: CPT

## 2021-06-29 PROCEDURE — 10002800 HB RX 250 W HCPCS: Performed by: EMERGENCY MEDICINE

## 2021-06-29 PROCEDURE — 73630 X-RAY EXAM OF FOOT: CPT | Performed by: RADIOLOGY

## 2021-06-29 PROCEDURE — 73630 X-RAY EXAM OF FOOT: CPT

## 2021-06-29 PROCEDURE — 80047 BASIC METABLC PNL IONIZED CA: CPT

## 2021-06-29 PROCEDURE — 80053 COMPREHEN METABOLIC PANEL: CPT | Performed by: EMERGENCY MEDICINE

## 2021-06-29 PROCEDURE — 10004157 XR FOOT 3+ VW LEFT

## 2021-06-29 PROCEDURE — 93971 EXTREMITY STUDY: CPT

## 2021-06-29 RX ORDER — TRAMADOL HYDROCHLORIDE 50 MG/1
50 TABLET ORAL EVERY 6 HOURS PRN
Qty: 12 TABLET | Refills: 0 | Status: SHIPPED | OUTPATIENT
Start: 2021-06-29 | End: 2022-03-08 | Stop reason: ALTCHOICE

## 2021-06-29 RX ADMIN — KETOROLAC TROMETHAMINE 15 MG: 15 INJECTION, SOLUTION INTRAMUSCULAR; INTRAVENOUS at 11:53

## 2021-06-29 ASSESSMENT — PAIN SCALES - GENERAL
PAINLEVEL_OUTOF10: 8
PAINLEVEL_OUTOF10: 9
PAINLEVEL_OUTOF10: 9

## 2021-06-29 ASSESSMENT — PAIN DESCRIPTION - PAIN TYPE
TYPE: ACUTE PAIN
TYPE: ACUTE PAIN

## 2021-07-22 DIAGNOSIS — I10 ESSENTIAL HYPERTENSION: ICD-10-CM

## 2021-07-22 DIAGNOSIS — N18.2 CKD (CHRONIC KIDNEY DISEASE) STAGE 2, GFR 60-89 ML/MIN: ICD-10-CM

## 2021-07-23 RX ORDER — LOSARTAN POTASSIUM 25 MG/1
25 TABLET ORAL DAILY
Qty: 90 TABLET | Refills: 0 | Status: SHIPPED | OUTPATIENT
Start: 2021-07-23 | End: 2021-12-28 | Stop reason: SDUPTHER

## 2021-08-31 ENCOUNTER — TELEPHONE (OUTPATIENT)
Dept: TELEHEALTH | Age: 71
End: 2021-08-31

## 2021-09-07 ENCOUNTER — TELEPHONE (OUTPATIENT)
Dept: TELEHEALTH | Age: 71
End: 2021-09-07

## 2021-12-02 ENCOUNTER — CLERICAL ORDERS (OUTPATIENT)
Dept: ADMINISTRATIVE | Age: 71
End: 2021-12-02

## 2021-12-02 DIAGNOSIS — Z12.31 VISIT FOR SCREENING MAMMOGRAM: Primary | ICD-10-CM

## 2021-12-28 ENCOUNTER — OFFICE VISIT (OUTPATIENT)
Dept: INTERNAL MEDICINE | Age: 71
End: 2021-12-28
Attending: STUDENT IN AN ORGANIZED HEALTH CARE EDUCATION/TRAINING PROGRAM

## 2021-12-28 ENCOUNTER — HOSPITAL ENCOUNTER (OUTPATIENT)
Dept: MAMMOGRAPHY | Age: 71
Discharge: HOME | End: 2021-12-28
Attending: INTERNAL MEDICINE

## 2021-12-28 VITALS
BODY MASS INDEX: 37.46 KG/M2 | TEMPERATURE: 98.4 F | DIASTOLIC BLOOD PRESSURE: 93 MMHG | SYSTOLIC BLOOD PRESSURE: 153 MMHG | OXYGEN SATURATION: 98 % | HEIGHT: 64 IN | WEIGHT: 219.4 LBS | RESPIRATION RATE: 20 BRPM | HEART RATE: 78 BPM

## 2021-12-28 DIAGNOSIS — Z12.31 VISIT FOR SCREENING MAMMOGRAM: ICD-10-CM

## 2021-12-28 DIAGNOSIS — N18.2 CKD (CHRONIC KIDNEY DISEASE) STAGE 2, GFR 60-89 ML/MIN: ICD-10-CM

## 2021-12-28 DIAGNOSIS — I10 ESSENTIAL HYPERTENSION: ICD-10-CM

## 2021-12-28 DIAGNOSIS — K22.2 GERD WITH STRICTURE: Primary | ICD-10-CM

## 2021-12-28 DIAGNOSIS — K21.9 GERD WITH STRICTURE: Primary | ICD-10-CM

## 2021-12-28 DIAGNOSIS — R60.0 EDEMA OF BOTH LEGS: ICD-10-CM

## 2021-12-28 PROCEDURE — 77063 BREAST TOMOSYNTHESIS BI: CPT

## 2021-12-28 PROCEDURE — 77067 SCR MAMMO BI INCL CAD: CPT | Performed by: RADIOLOGY

## 2021-12-28 PROCEDURE — 99214 OFFICE O/P EST MOD 30 MIN: CPT | Performed by: INTERNAL MEDICINE

## 2021-12-28 PROCEDURE — 99211 OFF/OP EST MAY X REQ PHY/QHP: CPT

## 2021-12-28 PROCEDURE — 77063 BREAST TOMOSYNTHESIS BI: CPT | Performed by: RADIOLOGY

## 2021-12-28 RX ORDER — FUROSEMIDE 20 MG/1
TABLET ORAL
Qty: 90 TABLET | Refills: 0 | Status: SHIPPED | OUTPATIENT
Start: 2021-12-28 | End: 2023-03-28 | Stop reason: ALTCHOICE

## 2021-12-28 RX ORDER — LOSARTAN POTASSIUM 25 MG/1
25 TABLET ORAL DAILY
Qty: 90 TABLET | Refills: 0 | Status: SHIPPED | OUTPATIENT
Start: 2021-12-28 | End: 2022-04-06

## 2021-12-28 ASSESSMENT — PATIENT HEALTH QUESTIONNAIRE - PHQ9
SUM OF ALL RESPONSES TO PHQ9 QUESTIONS 1 AND 2: 0
SUM OF ALL RESPONSES TO PHQ9 QUESTIONS 1 AND 2: 0
2. FEELING DOWN, DEPRESSED OR HOPELESS: NOT AT ALL
1. LITTLE INTEREST OR PLEASURE IN DOING THINGS: NOT AT ALL
CLINICAL INTERPRETATION OF PHQ2 SCORE: NO FURTHER SCREENING NEEDED

## 2022-02-10 ENCOUNTER — OFFICE VISIT (OUTPATIENT)
Dept: GASTROENTEROLOGY | Age: 72
End: 2022-02-10

## 2022-02-10 VITALS
BODY MASS INDEX: 37.17 KG/M2 | DIASTOLIC BLOOD PRESSURE: 76 MMHG | HEIGHT: 64 IN | TEMPERATURE: 97.4 F | WEIGHT: 217.7 LBS | SYSTOLIC BLOOD PRESSURE: 132 MMHG

## 2022-02-10 DIAGNOSIS — K22.10 EROSIVE ESOPHAGITIS: ICD-10-CM

## 2022-02-10 DIAGNOSIS — K21.9 GERD WITH STRICTURE: ICD-10-CM

## 2022-02-10 DIAGNOSIS — K21.9 GERD WITHOUT ESOPHAGITIS: Primary | ICD-10-CM

## 2022-02-10 DIAGNOSIS — K22.2 GERD WITH STRICTURE: ICD-10-CM

## 2022-02-10 PROCEDURE — 99204 OFFICE O/P NEW MOD 45 MIN: CPT | Performed by: INTERNAL MEDICINE

## 2022-02-10 RX ORDER — PANTOPRAZOLE SODIUM 40 MG/1
40 TABLET, DELAYED RELEASE ORAL DAILY
Qty: 30 TABLET | Refills: 3 | Status: SHIPPED | OUTPATIENT
Start: 2022-02-10 | End: 2022-02-10

## 2022-02-10 RX ORDER — PANTOPRAZOLE SODIUM 40 MG/1
40 TABLET, DELAYED RELEASE ORAL DAILY
Qty: 90 TABLET | Refills: 1 | Status: SHIPPED | OUTPATIENT
Start: 2022-02-10 | End: 2022-03-08 | Stop reason: SDUPTHER

## 2022-02-11 ENCOUNTER — TELEPHONE (OUTPATIENT)
Dept: INTERNAL MEDICINE | Age: 72
End: 2022-02-11

## 2022-02-15 ENCOUNTER — PREP FOR CASE (OUTPATIENT)
Dept: GASTROENTEROLOGY | Age: 72
End: 2022-02-15

## 2022-02-15 DIAGNOSIS — Z12.11 COLON CANCER SCREENING: Primary | ICD-10-CM

## 2022-02-15 DIAGNOSIS — Z01.812 ENCOUNTER FOR PREPROCEDURE SCREENING LABORATORY TESTING FOR COVID-19: ICD-10-CM

## 2022-02-15 DIAGNOSIS — K22.10 EROSIVE ESOPHAGITIS: ICD-10-CM

## 2022-02-15 DIAGNOSIS — Z11.52 ENCOUNTER FOR PREPROCEDURE SCREENING LABORATORY TESTING FOR COVID-19: ICD-10-CM

## 2022-02-15 RX ORDER — POLYETHYLENE GLYCOL 3350, SODIUM CHLORIDE, SODIUM BICARBONATE, POTASSIUM CHLORIDE 420; 11.2; 5.72; 1.48 G/4L; G/4L; G/4L; G/4L
4000 POWDER, FOR SOLUTION ORAL
Qty: 4000 ML | Refills: 0 | Status: SHIPPED | OUTPATIENT
Start: 2022-02-15 | End: 2022-03-15 | Stop reason: ALTCHOICE

## 2022-03-08 ASSESSMENT — ACTIVITIES OF DAILY LIVING (ADL)
NEEDS_ASSIST: NO
ADL_SHORT_OF_BREATH: NO
ADL_SCORE: 12
ADL_BEFORE_ADMISSION: INDEPENDENT
SENSORY_SUPPORT_DEVICES: EYEGLASSES
MOBILITY_ASSIST_DEVICES: CANE;STANDARD WALKER
RECENT_DECLINE_ADL: NO
HISTORY OF FALLING IN THE LAST YEAR (PRIOR TO ADMISSION): YES

## 2022-03-08 ASSESSMENT — COGNITIVE AND FUNCTIONAL STATUS - GENERAL
ARE YOU DEAF OR DO YOU HAVE SERIOUS DIFFICULTY  HEARING: NO
ARE YOU BLIND OR DO YOU HAVE SERIOUS DIFFICULTY SEEING, EVEN WHEN WEARING GLASSES: NO

## 2022-03-12 ENCOUNTER — LAB SERVICES (OUTPATIENT)
Dept: LAB | Age: 72
End: 2022-03-12
Attending: INTERNAL MEDICINE

## 2022-03-12 DIAGNOSIS — Z11.52 ENCOUNTER FOR PREPROCEDURE SCREENING LABORATORY TESTING FOR COVID-19: ICD-10-CM

## 2022-03-12 DIAGNOSIS — Z01.812 ENCOUNTER FOR PREPROCEDURE SCREENING LABORATORY TESTING FOR COVID-19: ICD-10-CM

## 2022-03-12 LAB
SARS-COV-2 RNA RESP QL NAA+PROBE: NOT DETECTED
SERVICE CMNT-IMP: NORMAL
SERVICE CMNT-IMP: NORMAL

## 2022-03-12 PROCEDURE — U0005 INFEC AGEN DETEC AMPLI PROBE: HCPCS

## 2022-03-14 ENCOUNTER — ANESTHESIA EVENT (OUTPATIENT)
Dept: SURGERY | Age: 72
End: 2022-03-14

## 2022-03-15 ENCOUNTER — ANESTHESIA (OUTPATIENT)
Dept: SURGERY | Age: 72
End: 2022-03-15

## 2022-03-15 ENCOUNTER — HOSPITAL ENCOUNTER (OUTPATIENT)
Age: 72
Discharge: HOME | End: 2022-03-15
Attending: INTERNAL MEDICINE | Admitting: INTERNAL MEDICINE

## 2022-03-15 DIAGNOSIS — K22.10 EROSIVE ESOPHAGITIS: ICD-10-CM

## 2022-03-15 DIAGNOSIS — Z12.11 COLON CANCER SCREENING: ICD-10-CM

## 2022-03-15 PROCEDURE — G0121 COLON CA SCRN NOT HI RSK IND: HCPCS | Performed by: CLINIC/CENTER

## 2022-03-15 PROCEDURE — G0121 COLON CA SCRN NOT HI RSK IND: HCPCS | Performed by: INTERNAL MEDICINE

## 2022-03-15 PROCEDURE — 88312 SPECIAL STAINS GROUP 1: CPT | Performed by: CLINICAL MEDICAL LABORATORY

## 2022-03-15 PROCEDURE — 88305 TISSUE EXAM BY PATHOLOGIST: CPT | Performed by: CLINICAL MEDICAL LABORATORY

## 2022-03-15 PROCEDURE — 43249 ESOPH EGD DILATION <30 MM: CPT | Performed by: INTERNAL MEDICINE

## 2022-03-15 PROCEDURE — A45378 ANES COLONOSCOPY FLEX PROX SPLEN FLEX D: Performed by: ANESTHESIOLOGY

## 2022-03-15 PROCEDURE — 43239 EGD BIOPSY SINGLE/MULTIPLE: CPT | Performed by: INTERNAL MEDICINE

## 2022-03-15 PROCEDURE — A45378 ANES COLONOSCOPY FLEX PROX SPLEN FLEX D: Performed by: ANESTHESIOLOGIST ASSISTANT

## 2022-03-15 PROCEDURE — 43239 EGD BIOPSY SINGLE/MULTIPLE: CPT | Performed by: CLINIC/CENTER

## 2022-03-15 PROCEDURE — 88341 IMHCHEM/IMCYTCHM EA ADD ANTB: CPT | Performed by: CLINICAL MEDICAL LABORATORY

## 2022-03-15 PROCEDURE — 43249 ESOPH EGD DILATION <30 MM: CPT | Performed by: CLINIC/CENTER

## 2022-03-15 PROCEDURE — 88342 IMHCHEM/IMCYTCHM 1ST ANTB: CPT | Performed by: CLINICAL MEDICAL LABORATORY

## 2022-03-15 RX ORDER — PROPOFOL 10 MG/ML
INJECTION, EMULSION INTRAVENOUS PRN
Status: DISCONTINUED | OUTPATIENT
Start: 2022-03-15 | End: 2022-03-15

## 2022-03-15 RX ORDER — DEXTROSE MONOHYDRATE 25 G/50ML
25 INJECTION, SOLUTION INTRAVENOUS PRN
Status: DISCONTINUED | OUTPATIENT
Start: 2022-03-15 | End: 2022-03-17 | Stop reason: HOSPADM

## 2022-03-15 RX ORDER — HYDRALAZINE HYDROCHLORIDE 20 MG/ML
5 INJECTION INTRAMUSCULAR; INTRAVENOUS EVERY 10 MIN PRN
Status: DISCONTINUED | OUTPATIENT
Start: 2022-03-15 | End: 2022-03-17 | Stop reason: HOSPADM

## 2022-03-15 RX ORDER — SODIUM CHLORIDE 9 MG/ML
INJECTION, SOLUTION INTRAVENOUS CONTINUOUS
Status: DISCONTINUED | OUTPATIENT
Start: 2022-03-15 | End: 2022-03-17 | Stop reason: HOSPADM

## 2022-03-15 RX ORDER — 0.9 % SODIUM CHLORIDE 0.9 %
2 VIAL (ML) INJECTION EVERY 12 HOURS SCHEDULED
Status: DISCONTINUED | OUTPATIENT
Start: 2022-03-15 | End: 2022-03-17 | Stop reason: HOSPADM

## 2022-03-15 RX ORDER — DEXTROSE MONOHYDRATE 50 MG/ML
INJECTION, SOLUTION INTRAVENOUS CONTINUOUS PRN
Status: DISCONTINUED | OUTPATIENT
Start: 2022-03-15 | End: 2022-03-17 | Stop reason: HOSPADM

## 2022-03-15 RX ORDER — SODIUM CHLORIDE, SODIUM LACTATE, POTASSIUM CHLORIDE, CALCIUM CHLORIDE 600; 310; 30; 20 MG/100ML; MG/100ML; MG/100ML; MG/100ML
INJECTION, SOLUTION INTRAVENOUS CONTINUOUS
Status: DISCONTINUED | OUTPATIENT
Start: 2022-03-15 | End: 2022-03-17 | Stop reason: HOSPADM

## 2022-03-15 RX ORDER — LIDOCAINE HYDROCHLORIDE 20 MG/ML
10 SOLUTION OROPHARYNGEAL
Status: DISCONTINUED | OUTPATIENT
Start: 2022-03-15 | End: 2022-03-17 | Stop reason: HOSPADM

## 2022-03-15 RX ORDER — NICOTINE POLACRILEX 4 MG
30 LOZENGE BUCCAL
Status: DISCONTINUED | OUTPATIENT
Start: 2022-03-15 | End: 2022-03-17 | Stop reason: HOSPADM

## 2022-03-15 RX ORDER — DIPHENHYDRAMINE HYDROCHLORIDE 50 MG/ML
12.5 INJECTION INTRAMUSCULAR; INTRAVENOUS EVERY 4 HOURS PRN
Status: DISCONTINUED | OUTPATIENT
Start: 2022-03-15 | End: 2022-03-17 | Stop reason: HOSPADM

## 2022-03-15 RX ORDER — HUMAN INSULIN 100 [IU]/ML
INJECTION, SOLUTION SUBCUTANEOUS
Status: DISCONTINUED | OUTPATIENT
Start: 2022-03-15 | End: 2022-03-17 | Stop reason: HOSPADM

## 2022-03-15 RX ORDER — ONDANSETRON 2 MG/ML
4 INJECTION INTRAMUSCULAR; INTRAVENOUS 2 TIMES DAILY PRN
Status: DISCONTINUED | OUTPATIENT
Start: 2022-03-15 | End: 2022-03-17 | Stop reason: HOSPADM

## 2022-03-15 RX ORDER — PROCHLORPERAZINE EDISYLATE 5 MG/ML
5 INJECTION INTRAMUSCULAR; INTRAVENOUS EVERY 4 HOURS PRN
Status: DISCONTINUED | OUTPATIENT
Start: 2022-03-15 | End: 2022-03-17 | Stop reason: HOSPADM

## 2022-03-15 RX ORDER — SUCRALFATE ORAL 1 G/10ML
1 SUSPENSION ORAL 4 TIMES DAILY
Qty: 1200 ML | Refills: 1 | Status: SHIPPED | OUTPATIENT
Start: 2022-03-15 | End: 2022-07-26 | Stop reason: SDUPTHER

## 2022-03-15 RX ORDER — SODIUM CHLORIDE 9 MG/ML
INJECTION, SOLUTION INTRAVENOUS CONTINUOUS PRN
Status: DISCONTINUED | OUTPATIENT
Start: 2022-03-15 | End: 2022-03-15

## 2022-03-15 RX ORDER — LIDOCAINE HYDROCHLORIDE 20 MG/ML
INJECTION, SOLUTION INFILTRATION; PERINEURAL PRN
Status: DISCONTINUED | OUTPATIENT
Start: 2022-03-15 | End: 2022-03-15

## 2022-03-15 RX ADMIN — PROPOFOL 50 MG: 10 INJECTION, EMULSION INTRAVENOUS at 08:19

## 2022-03-15 RX ADMIN — LIDOCAINE HYDROCHLORIDE 50 MG: 20 INJECTION, SOLUTION INFILTRATION; PERINEURAL at 08:19

## 2022-03-15 RX ADMIN — PROPOFOL 40 MG: 10 INJECTION, EMULSION INTRAVENOUS at 08:17

## 2022-03-15 RX ADMIN — SODIUM CHLORIDE: 9 INJECTION, SOLUTION INTRAVENOUS at 08:12

## 2022-03-15 ASSESSMENT — PAIN SCALES - GENERAL
PAINLEVEL_OUTOF10: 0
PAINLEVEL_OUTOF10: 0

## 2022-03-15 ASSESSMENT — ENCOUNTER SYMPTOMS: EXERCISE TOLERANCE: GOOD (>4 METS)

## 2022-03-16 VITALS
HEART RATE: 65 BPM | WEIGHT: 213.85 LBS | DIASTOLIC BLOOD PRESSURE: 80 MMHG | BODY MASS INDEX: 36.71 KG/M2 | SYSTOLIC BLOOD PRESSURE: 162 MMHG | RESPIRATION RATE: 18 BRPM | TEMPERATURE: 97.5 F | OXYGEN SATURATION: 100 %

## 2022-03-19 PROBLEM — R56.9 SEIZURE (HCC): Status: ACTIVE | Noted: 2020-10-10

## 2022-03-19 PROBLEM — D72.829 LEUKOCYTOSIS: Status: ACTIVE | Noted: 2020-10-10

## 2022-03-19 PROBLEM — R55 SYNCOPE: Status: ACTIVE | Noted: 2020-10-11

## 2022-03-19 PROBLEM — S42.202A CLOSED FRACTURE OF LEFT PROXIMAL HUMERUS: Status: ACTIVE | Noted: 2020-10-10

## 2022-03-22 LAB
ASR DISCLAIMER: NORMAL
CASE RPRT: NORMAL
CLINICAL INFO: NORMAL
PATH REPORT.FINAL DX SPEC: NORMAL
PATH REPORT.FINAL DX SPEC: NORMAL
PATH REPORT.GROSS SPEC: NORMAL
PATH REPORT.MICROSCOPIC SPEC OTHER STN: NORMAL

## 2022-03-23 ENCOUNTER — TELEPHONE (OUTPATIENT)
Dept: GASTROENTEROLOGY | Age: 72
End: 2022-03-23

## 2022-03-23 DIAGNOSIS — Z11.52 ENCOUNTER FOR PREPROCEDURE SCREENING LABORATORY TESTING FOR COVID-19: Primary | ICD-10-CM

## 2022-03-23 DIAGNOSIS — K22.10 EROSIVE ESOPHAGITIS: ICD-10-CM

## 2022-03-23 DIAGNOSIS — K22.2 GERD WITH STRICTURE: ICD-10-CM

## 2022-03-23 DIAGNOSIS — Z01.812 ENCOUNTER FOR PREPROCEDURE SCREENING LABORATORY TESTING FOR COVID-19: Primary | ICD-10-CM

## 2022-03-23 DIAGNOSIS — K21.9 GERD WITH STRICTURE: ICD-10-CM

## 2022-03-23 RX ORDER — PANTOPRAZOLE SODIUM 40 MG/1
TABLET, DELAYED RELEASE ORAL
Qty: 180 TABLET | Refills: 0 | Status: SHIPPED | OUTPATIENT
Start: 2022-03-23 | End: 2022-09-13 | Stop reason: DRUGHIGH

## 2022-03-24 ENCOUNTER — PREP FOR CASE (OUTPATIENT)
Dept: GASTROENTEROLOGY | Age: 72
End: 2022-03-24

## 2022-03-24 DIAGNOSIS — K22.2 ESOPHAGEAL STRICTURE: ICD-10-CM

## 2022-03-24 DIAGNOSIS — K22.10 EROSIVE ESOPHAGITIS: Primary | ICD-10-CM

## 2022-04-06 DIAGNOSIS — I10 ESSENTIAL HYPERTENSION: ICD-10-CM

## 2022-04-06 DIAGNOSIS — N18.2 CKD (CHRONIC KIDNEY DISEASE) STAGE 2, GFR 60-89 ML/MIN: ICD-10-CM

## 2022-04-06 RX ORDER — LOSARTAN POTASSIUM 25 MG/1
25 TABLET ORAL DAILY
Qty: 90 TABLET | Refills: 0 | Status: SHIPPED | OUTPATIENT
Start: 2022-04-06 | End: 2022-07-26 | Stop reason: SDUPTHER

## 2022-05-24 ENCOUNTER — OFFICE VISIT (OUTPATIENT)
Dept: ORTHOPEDIC SURGERY | Age: 72
End: 2022-05-24
Payer: MEDICARE

## 2022-05-24 VITALS — BODY MASS INDEX: 37.56 KG/M2 | WEIGHT: 220 LBS | HEIGHT: 64 IN

## 2022-05-24 DIAGNOSIS — M47.816 LUMBAR SPONDYLOSIS: Primary | ICD-10-CM

## 2022-05-24 DIAGNOSIS — M43.10 ACQUIRED SPONDYLOLISTHESIS: ICD-10-CM

## 2022-05-24 DIAGNOSIS — M51.36 DISC DEGENERATION, LUMBAR: ICD-10-CM

## 2022-05-24 PROCEDURE — 99214 OFFICE O/P EST MOD 30 MIN: CPT | Performed by: PHYSICAL MEDICINE & REHABILITATION

## 2022-05-24 RX ORDER — DULOXETIN HYDROCHLORIDE 30 MG/1
30 CAPSULE, DELAYED RELEASE ORAL 2 TIMES DAILY
Qty: 60 CAPSULE | Refills: 5 | Status: SHIPPED | OUTPATIENT
Start: 2022-05-24 | End: 2022-06-23

## 2022-05-24 NOTE — PROGRESS NOTES
[facility]  Consultation Note    Patient ID:  Name: Ashtyn Garcia  MRN: [id number]  AGE: 67 y.o.  : 1950    Date of Consultation:  May 24, 2022    CC:   Chief Complaint   Patient presents with    Back Pain         HPI:  Ms. Chantell Davenport is a 69-year-old female who presents today for follow-up of low back pain. She has been performing physician guided home exercises daily since 2022. These have made her pain worse. Cymbalta has helped mildly. Tizanidine has helped mildly. The pain severely affects her functioning. The pain is aggravated with walking or standing greater than 10 minutes. The pain affects her ability to cook or take care of her home. The pain is associated with nighttime awakening. The symptoms are alleviated when she sits down. She rates the pain as 8/10 in severity. The symptoms are getting worse with time. X-rays of the lumbar spine 2022 showed anterolisthesis at L4-5-S1 with multilevel degenerative change of the lumbar spine.      Past Medical History Includes:   Past Medical History:   Diagnosis Date    Arthritis     osteo    Chronic pain     r/t arthritis in hip and knees    DDD (degenerative disc disease)     \"back\"    GERD (gastroesophageal reflux disease)     controlled w/med    Psychiatric disorder     PTSD, severe anxiety with medical procedures    Seizure ([address]     Thyroid disease     history of hypo in past. No current issues, no meds   ,   Past Surgical History:   Procedure Laterality Date    JOINT REPLACEMENT Left 2018    SHOULDER SURGERY Left 2019    TOTAL HIP ARTHROPLASTY Right 2014     Family History:   Family History   Problem Relation Age of Onset    Diabetes Mother     Osteoarthritis Mother     Osteoarthritis Father     Lung Disease Father     Cancer Mother       Social History:   Social History     Tobacco Use    Smoking status: Never Smoker    Smokeless tobacco: Never Used   Substance Use Topics    Alcohol use: Yes       ALLERGIES:   Allergies   Allergen Reactions    Oxycodone Nausea And Vomiting and Other (See Comments)     \" incoherent \"     Sulfa Antibiotics Rash        Patient Medications    Current Outpatient Medications   Medication Sig    DULoxetine (CYMBALTA) 30 MG extended release capsule Take 1 capsule by mouth 2 times daily    acetaminophen (TYLENOL) 325 MG tablet Take 500 mg by mouth 2 times daily    ALPRAZolam (XANAX) 1 MG tablet Take 1 tablet by mouth 1 hour prior to MRI    diphenhydrAMINE (BENADRYL) 25 MG capsule Take 25 mg by mouth    hydrALAZINE (APRESOLINE) 10 MG tablet Take 10 mg by mouth every 8 hours as needed    ondansetron (ZOFRAN) 4 MG/2ML injection Infuse 4 mg intravenously every 6 hours as needed    raNITIdine (ZANTAC) 150 MG tablet Take 150 mg by mouth daily    tiZANidine (ZANAFLEX) 2 MG tablet TAKE 1 TABLET BY MOUTH THREE TIMES A DAY    tiZANidine (ZANAFLEX) 6 MG capsule Take 5 mg by mouth every 6 hours     No current facility-administered medications for this visit. ROS/Meds/PSH/PMH/FH/SH: I personally reviewed the patients standard intake form. Physical Exam:      Limited: PE: General: Awake, alert, no distress. HEENT: Mucous membranes moist and pink. Psych: Appropriate and conversant. Derm: No rash Gait: Unassisted, non-ataxic MS: She has tenderness to palpation over the lower lumbar spine and paraspinals. No pain with lumbar flexion. She has pain with lumbar extension. No pain with lumbar facet load. VITALS: @IPVITALS(8:)@ , [id number]       No flowsheet data found. No results found for any visits on 05/24/22. Assessment and Plan:     ICD-10-CM    1. Lumbar spondylosis  M47.816 MRI LUMBAR SPINE WO CONTRAST     External Referral To Orthopedic Surgery   2. Disc degeneration, lumbar  M51.36 MRI LUMBAR SPINE WO CONTRAST     External Referral To Orthopedic Surgery   3.  Acquired spondylolisthesis  M43.10 MRI LUMBAR SPINE WO CONTRAST External Referral To Orthopedic Surgery       Orders Placed This Encounter   Procedures    MRI LUMBAR SPINE WO CONTRAST     Standing Status:   Future     Standing Expiration Date:   5/24/2023     Order Specific Question:   Reason for exam:     Answer:   lumbar spine pain    External Referral To Orthopedic Surgery     Referral Priority:   Routine     Referral Type:   Eval and Treat     Referral Reason:   Specialty Services Required     Requested Specialty:   Orthopedic Surgery     Number of Visits Requested:   1       4   This is a chronic illness/condition with exacerbation and progression  Treatment at this time: Prescription Drug Management we will increase the Cymbalta to see if that may help more with her neuropathic pain. She would like to seek surgical consultation with Dr. Meghann Jean, and we will arrange for this after the new MRI. She will take a copy of her images with her to that appointment.     Studies ordered today: MRI      Anne Stroud MD  5/24/2022,  3:22 PM

## 2022-05-24 NOTE — LETTER
Gates Adjutant  1950  ______________________________________________________________________    Radiographic Studies:    Cervical MRI/ Contrast        Thoracic MRI/ Contrast        Lumbar MRI/ Contrast    CT Myelogram __________________________________________________    NCS/EMG______________________________________________________    MRI of ________________________________________________________    Other__________________________________________________________      Injections:    _______________________________________________________________    Authorization to stop blood thinners________________________________      Medications:    Oral steroids___________________    Muscle Relaxers___________________    Pain medications_____________________    NSAIDS_____________________    Neuropathic pain medication_________________________________________      Physical Therapy:    Lumbar     Thoracic     Cervical       Other_______________________________      Follow up/ Referral:    Pain referral_______________________________________________________    Referral___________________________________________________________    Follow up_________________________________________________________    Handicapped Parking_______________________________________________    Other_____________________________________________________________

## 2022-06-01 ENCOUNTER — TELEPHONE (OUTPATIENT)
Dept: ORTHOPEDIC SURGERY | Age: 72
End: 2022-06-01

## 2022-06-01 NOTE — TELEPHONE ENCOUNTER
Spoke with the patient and told her we have to cancel [address] mri appointment because [employer](third party) has denied the mri request due to a previous denial for the same thing but a appeal will have to be done on the previous mri request. Patient asked for mahesh to call her back.

## 2022-06-14 ASSESSMENT — ACTIVITIES OF DAILY LIVING (ADL)
SENSORY_SUPPORT_DEVICES: EYEGLASSES
ADL_SHORT_OF_BREATH: NO
MOBILITY_ASSIST_DEVICES: CANE;STANDARD WALKER
ADL_SCORE: 12
HISTORY OF FALLING IN THE LAST YEAR (PRIOR TO ADMISSION): NO
RECENT_DECLINE_ADL: NO
ADL_BEFORE_ADMISSION: INDEPENDENT
NEEDS_ASSIST: NO

## 2022-06-17 ENCOUNTER — ANESTHESIA EVENT (OUTPATIENT)
Dept: SURGERY | Age: 72
End: 2022-06-17

## 2022-06-17 ENCOUNTER — LAB SERVICES (OUTPATIENT)
Dept: LAB | Age: 72
End: 2022-06-17
Attending: INTERNAL MEDICINE

## 2022-06-17 DIAGNOSIS — Z01.812 ENCOUNTER FOR PREPROCEDURE SCREENING LABORATORY TESTING FOR COVID-19: ICD-10-CM

## 2022-06-17 DIAGNOSIS — Z11.52 ENCOUNTER FOR PREPROCEDURE SCREENING LABORATORY TESTING FOR COVID-19: ICD-10-CM

## 2022-06-17 PROCEDURE — U0003 INFECTIOUS AGENT DETECTION BY NUCLEIC ACID (DNA OR RNA); SEVERE ACUTE RESPIRATORY SYNDROME CORONAVIRUS 2 (SARS-COV-2) (CORONAVIRUS DISEASE [COVID-19]), AMPLIFIED PROBE TECHNIQUE, MAKING USE OF HIGH THROUGHPUT TECHNOLOGIES AS DESCRIBED BY CMS-2020-01-R: HCPCS

## 2022-06-18 LAB
SARS-COV-2 RNA RESP QL NAA+PROBE: NOT DETECTED
SERVICE CMNT-IMP: NORMAL
SERVICE CMNT-IMP: NORMAL

## 2022-06-19 ENCOUNTER — NURSE TRIAGE (OUTPATIENT)
Dept: TELEHEALTH | Age: 72
End: 2022-06-19

## 2022-06-20 ENCOUNTER — ANESTHESIA (OUTPATIENT)
Dept: SURGERY | Age: 72
End: 2022-06-20

## 2022-06-20 ENCOUNTER — HOSPITAL ENCOUNTER (OUTPATIENT)
Age: 72
Discharge: HOME | End: 2022-06-20
Attending: INTERNAL MEDICINE | Admitting: INTERNAL MEDICINE

## 2022-06-20 VITALS
SYSTOLIC BLOOD PRESSURE: 159 MMHG | RESPIRATION RATE: 16 BRPM | TEMPERATURE: 97.8 F | WEIGHT: 220.46 LBS | BODY MASS INDEX: 37.84 KG/M2 | OXYGEN SATURATION: 96 % | HEART RATE: 80 BPM | DIASTOLIC BLOOD PRESSURE: 82 MMHG

## 2022-06-20 DIAGNOSIS — K22.2 ESOPHAGEAL STRICTURE: ICD-10-CM

## 2022-06-20 DIAGNOSIS — K22.10 EROSIVE ESOPHAGITIS: ICD-10-CM

## 2022-06-20 PROCEDURE — 88305 TISSUE EXAM BY PATHOLOGIST: CPT | Performed by: CLINICAL MEDICAL LABORATORY

## 2022-06-20 PROCEDURE — 43249 ESOPH EGD DILATION <30 MM: CPT | Performed by: INTERNAL MEDICINE

## 2022-06-20 PROCEDURE — A43249 ANES UGI ENDO; W/BALLOON DILAT ESOPHAGUS: Performed by: ANESTHESIOLOGY

## 2022-06-20 PROCEDURE — 43249 ESOPH EGD DILATION <30 MM: CPT | Performed by: CLINIC/CENTER

## 2022-06-20 PROCEDURE — 43239 EGD BIOPSY SINGLE/MULTIPLE: CPT | Performed by: CLINIC/CENTER

## 2022-06-20 PROCEDURE — A43249 ANES UGI ENDO; W/BALLOON DILAT ESOPHAGUS: Performed by: ANESTHESIOLOGIST ASSISTANT

## 2022-06-20 PROCEDURE — 43239 EGD BIOPSY SINGLE/MULTIPLE: CPT | Performed by: INTERNAL MEDICINE

## 2022-06-20 RX ORDER — DEXTROSE MONOHYDRATE 50 MG/ML
INJECTION, SOLUTION INTRAVENOUS CONTINUOUS PRN
Status: DISCONTINUED | OUTPATIENT
Start: 2022-06-20 | End: 2022-06-22 | Stop reason: HOSPADM

## 2022-06-20 RX ORDER — 0.9 % SODIUM CHLORIDE 0.9 %
2 VIAL (ML) INJECTION EVERY 12 HOURS SCHEDULED
Status: DISCONTINUED | OUTPATIENT
Start: 2022-06-20 | End: 2022-06-22 | Stop reason: HOSPADM

## 2022-06-20 RX ORDER — LIDOCAINE HYDROCHLORIDE 10 MG/ML
5-10 INJECTION, SOLUTION INFILTRATION; PERINEURAL PRN
Status: DISCONTINUED | OUTPATIENT
Start: 2022-06-20 | End: 2022-06-22 | Stop reason: HOSPADM

## 2022-06-20 RX ORDER — SODIUM CHLORIDE, SODIUM LACTATE, POTASSIUM CHLORIDE, CALCIUM CHLORIDE 600; 310; 30; 20 MG/100ML; MG/100ML; MG/100ML; MG/100ML
INJECTION, SOLUTION INTRAVENOUS CONTINUOUS
Status: DISCONTINUED | OUTPATIENT
Start: 2022-06-20 | End: 2022-06-22 | Stop reason: HOSPADM

## 2022-06-20 RX ORDER — NICOTINE POLACRILEX 4 MG
30 LOZENGE BUCCAL
Status: DISCONTINUED | OUTPATIENT
Start: 2022-06-20 | End: 2022-06-22 | Stop reason: HOSPADM

## 2022-06-20 RX ORDER — PROPOFOL 10 MG/ML
INJECTION, EMULSION INTRAVENOUS PRN
Status: DISCONTINUED | OUTPATIENT
Start: 2022-06-20 | End: 2022-06-20

## 2022-06-20 RX ORDER — LIDOCAINE HYDROCHLORIDE 20 MG/ML
10 SOLUTION OROPHARYNGEAL
Status: DISCONTINUED | OUTPATIENT
Start: 2022-06-20 | End: 2022-06-22 | Stop reason: HOSPADM

## 2022-06-20 RX ORDER — PROPOFOL 10 MG/ML
INJECTION, EMULSION INTRAVENOUS
Status: COMPLETED
Start: 2022-06-20 | End: 2022-06-20

## 2022-06-20 RX ORDER — SODIUM CHLORIDE 9 MG/ML
INJECTION, SOLUTION INTRAVENOUS CONTINUOUS
Status: DISCONTINUED | OUTPATIENT
Start: 2022-06-20 | End: 2022-06-22 | Stop reason: HOSPADM

## 2022-06-20 RX ORDER — DEXTROSE MONOHYDRATE 25 G/50ML
25 INJECTION, SOLUTION INTRAVENOUS PRN
Status: DISCONTINUED | OUTPATIENT
Start: 2022-06-20 | End: 2022-06-22 | Stop reason: HOSPADM

## 2022-06-20 RX ORDER — HUMAN INSULIN 100 [IU]/ML
INJECTION, SOLUTION SUBCUTANEOUS
Status: DISCONTINUED | OUTPATIENT
Start: 2022-06-20 | End: 2022-06-22 | Stop reason: HOSPADM

## 2022-06-20 RX ADMIN — PROPOFOL 50 MG: 10 INJECTION, EMULSION INTRAVENOUS at 09:53

## 2022-06-20 RX ADMIN — PROPOFOL 60 MG: 10 INJECTION, EMULSION INTRAVENOUS at 09:52

## 2022-06-20 RX ADMIN — SODIUM CHLORIDE: 9 INJECTION, SOLUTION INTRAVENOUS at 09:48

## 2022-06-20 RX ADMIN — PROPOFOL 30 MG: 10 INJECTION, EMULSION INTRAVENOUS at 09:56

## 2022-06-20 ASSESSMENT — ACTIVITIES OF DAILY LIVING (ADL)
ADL_SCORE: 12
MOBILITY_ASSIST_DEVICES: CANE;STANDARD WALKER
NEEDS_ASSIST: NO
ADL_BEFORE_ADMISSION: INDEPENDENT
RECENT_DECLINE_ADL: NO
SENSORY_SUPPORT_DEVICES: EYEGLASSES
ADL_SHORT_OF_BREATH: NO

## 2022-06-20 ASSESSMENT — ENCOUNTER SYMPTOMS: EXERCISE TOLERANCE: GOOD (>4 METS)

## 2022-06-20 ASSESSMENT — PAIN SCALES - GENERAL
PAINLEVEL_OUTOF10: 0

## 2022-06-22 ENCOUNTER — TELEPHONE (OUTPATIENT)
Dept: GASTROENTEROLOGY | Age: 72
End: 2022-06-22

## 2022-06-22 LAB
ASR DISCLAIMER: NORMAL
CASE RPRT: NORMAL
CLINICAL INFO: NORMAL
PATH REPORT.FINAL DX SPEC: NORMAL
PATH REPORT.GROSS SPEC: NORMAL
PATH REPORT.MICROSCOPIC SPEC OTHER STN: NORMAL

## 2022-07-26 ENCOUNTER — OFFICE VISIT (OUTPATIENT)
Dept: INTERNAL MEDICINE | Age: 72
End: 2022-07-26
Attending: INTERNAL MEDICINE

## 2022-07-26 ENCOUNTER — APPOINTMENT (OUTPATIENT)
Dept: LAB | Age: 72
End: 2022-07-26
Attending: INTERNAL MEDICINE

## 2022-07-26 VITALS
TEMPERATURE: 97.1 F | OXYGEN SATURATION: 99 % | DIASTOLIC BLOOD PRESSURE: 84 MMHG | WEIGHT: 217.2 LBS | HEART RATE: 77 BPM | HEIGHT: 64 IN | BODY MASS INDEX: 37.08 KG/M2 | SYSTOLIC BLOOD PRESSURE: 145 MMHG

## 2022-07-26 DIAGNOSIS — I10 UNCONTROLLED HYPERTENSION: Primary | ICD-10-CM

## 2022-07-26 DIAGNOSIS — E66.01 CLASS 2 SEVERE OBESITY DUE TO EXCESS CALORIES WITH SERIOUS COMORBIDITY AND BODY MASS INDEX (BMI) OF 37.0 TO 37.9 IN ADULT (CMD): ICD-10-CM

## 2022-07-26 DIAGNOSIS — H61.23 BILATERAL IMPACTED CERUMEN: ICD-10-CM

## 2022-07-26 DIAGNOSIS — R41.3 MEMORY LOSS, SHORT TERM: ICD-10-CM

## 2022-07-26 DIAGNOSIS — K21.00 GASTROESOPHAGEAL REFLUX DISEASE WITH ESOPHAGITIS WITHOUT HEMORRHAGE: ICD-10-CM

## 2022-07-26 DIAGNOSIS — H91.93 DECREASED HEARING OF BOTH EARS: ICD-10-CM

## 2022-07-26 PROBLEM — R73.09 ELEVATED HEMOGLOBIN A1C: Status: RESOLVED | Noted: 2017-09-06 | Resolved: 2022-07-26

## 2022-07-26 LAB
ALBUMIN SERPL-MCNC: 3.4 G/DL (ref 3.6–5.1)
ALBUMIN/GLOB SERPL: 0.9 {RATIO} (ref 1–2.4)
ALP SERPL-CCNC: 89 UNITS/L (ref 45–117)
ALT SERPL-CCNC: 15 UNITS/L
ANION GAP SERPL CALC-SCNC: 8 MMOL/L (ref 7–19)
AST SERPL-CCNC: 11 UNITS/L
BILIRUB SERPL-MCNC: 0.5 MG/DL (ref 0.2–1)
BUN SERPL-MCNC: 11 MG/DL (ref 6–20)
BUN/CREAT SERPL: 13 (ref 7–25)
CALCIUM SERPL-MCNC: 9.3 MG/DL (ref 8.4–10.2)
CHLORIDE SERPL-SCNC: 109 MMOL/L (ref 97–110)
CHOLEST SERPL-MCNC: 169 MG/DL
CHOLEST/HDLC SERPL: 3.7 {RATIO}
CO2 SERPL-SCNC: 27 MMOL/L (ref 21–32)
CREAT SERPL-MCNC: 0.87 MG/DL (ref 0.51–0.95)
CREAT UR-MCNC: 182 MG/DL
DEPRECATED RDW RBC: 44 FL (ref 39–50)
ERYTHROCYTE [DISTWIDTH] IN BLOOD: 13.2 % (ref 11–15)
FASTING DURATION TIME PATIENT: ABNORMAL H
FASTING DURATION TIME PATIENT: ABNORMAL H
GFR SERPLBLD BASED ON 1.73 SQ M-ARVRAT: 70 ML/MIN
GLOBULIN SER-MCNC: 3.6 G/DL (ref 2–4)
GLUCOSE SERPL-MCNC: 92 MG/DL (ref 70–99)
HCT VFR BLD CALC: 38.9 % (ref 36–46.5)
HDLC SERPL-MCNC: 46 MG/DL
HGB BLD-MCNC: 12 G/DL (ref 12–15.5)
LDLC SERPL CALC-MCNC: 106 MG/DL
MCH RBC QN AUTO: 28.4 PG (ref 26–34)
MCHC RBC AUTO-ENTMCNC: 30.8 G/DL (ref 32–36.5)
MCV RBC AUTO: 92 FL (ref 78–100)
MICROALBUMIN UR-MCNC: 25.7 MG/DL
MICROALBUMIN/CREAT UR: 141.2 MG/G
NONHDLC SERPL-MCNC: 123 MG/DL
NRBC BLD MANUAL-RTO: 0 /100 WBC
PLATELET # BLD AUTO: 361 K/MCL (ref 140–450)
POTASSIUM SERPL-SCNC: 4.4 MMOL/L (ref 3.4–5.1)
PROT SERPL-MCNC: 7 G/DL (ref 6.4–8.2)
RBC # BLD: 4.23 MIL/MCL (ref 4–5.2)
SODIUM SERPL-SCNC: 140 MMOL/L (ref 135–145)
TRIGL SERPL-MCNC: 86 MG/DL
WBC # BLD: 8.2 K/MCL (ref 4.2–11)

## 2022-07-26 PROCEDURE — 84443 ASSAY THYROID STIM HORMONE: CPT | Performed by: INTERNAL MEDICINE

## 2022-07-26 PROCEDURE — 85027 COMPLETE CBC AUTOMATED: CPT | Performed by: INTERNAL MEDICINE

## 2022-07-26 PROCEDURE — 82043 UR ALBUMIN QUANTITATIVE: CPT | Performed by: INTERNAL MEDICINE

## 2022-07-26 PROCEDURE — 99214 OFFICE O/P EST MOD 30 MIN: CPT | Performed by: INTERNAL MEDICINE

## 2022-07-26 PROCEDURE — 82607 VITAMIN B-12: CPT | Performed by: INTERNAL MEDICINE

## 2022-07-26 PROCEDURE — 36415 COLL VENOUS BLD VENIPUNCTURE: CPT | Performed by: INTERNAL MEDICINE

## 2022-07-26 PROCEDURE — G0439 PPPS, SUBSEQ VISIT: HCPCS | Performed by: INTERNAL MEDICINE

## 2022-07-26 PROCEDURE — 80053 COMPREHEN METABOLIC PANEL: CPT | Performed by: INTERNAL MEDICINE

## 2022-07-26 PROCEDURE — 99211 OFF/OP EST MAY X REQ PHY/QHP: CPT

## 2022-07-26 PROCEDURE — 80061 LIPID PANEL: CPT | Performed by: INTERNAL MEDICINE

## 2022-07-26 RX ORDER — SUCRALFATE ORAL 1 G/10ML
1 SUSPENSION ORAL 4 TIMES DAILY
Qty: 1200 ML | Refills: 1 | Status: SHIPPED | OUTPATIENT
Start: 2022-07-26 | End: 2022-09-13 | Stop reason: ALTCHOICE

## 2022-07-26 RX ORDER — LIDOCAINE 40 MG/G
CREAM TOPICAL PRN
Qty: 30 G | Refills: 4 | Status: SHIPPED | OUTPATIENT
Start: 2022-07-26

## 2022-07-26 RX ORDER — LOSARTAN POTASSIUM 25 MG/1
25 TABLET ORAL DAILY
Qty: 90 TABLET | Refills: 3 | Status: SHIPPED | OUTPATIENT
Start: 2022-07-26 | End: 2023-03-28 | Stop reason: DRUGHIGH

## 2022-07-26 ASSESSMENT — MONTREAL COGNITIVE ASSESSMENT (MOCA): WHAT IS THE TOTAL SCORE (OUT OF 30): 20

## 2022-07-26 ASSESSMENT — PATIENT HEALTH QUESTIONNAIRE - PHQ9
1. LITTLE INTEREST OR PLEASURE IN DOING THINGS: NOT AT ALL
SUM OF ALL RESPONSES TO PHQ9 QUESTIONS 1 AND 2: 0
SUM OF ALL RESPONSES TO PHQ9 QUESTIONS 1 AND 2: 0
CLINICAL INTERPRETATION OF PHQ2 SCORE: NO FURTHER SCREENING NEEDED
2. FEELING DOWN, DEPRESSED OR HOPELESS: NOT AT ALL

## 2022-07-27 LAB
TSH SERPL-ACNC: 3 MCUNITS/ML (ref 0.35–5)
VIT B12 SERPL-MCNC: 312 PG/ML (ref 211–911)

## 2022-07-28 PROBLEM — D58.2 ELEVATED HEMOGLOBIN (CMD): Status: RESOLVED | Noted: 2021-01-09 | Resolved: 2022-07-28

## 2022-08-02 ENCOUNTER — TELEPHONE (OUTPATIENT)
Dept: INTERNAL MEDICINE | Age: 72
End: 2022-08-02

## 2022-08-09 ENCOUNTER — APPOINTMENT (OUTPATIENT)
Dept: AUDIOLOGY | Age: 72
End: 2022-08-09

## 2022-09-06 ENCOUNTER — APPOINTMENT (OUTPATIENT)
Dept: INTERNAL MEDICINE | Age: 72
End: 2022-09-06
Attending: INTERNAL MEDICINE

## 2022-09-13 ENCOUNTER — OFFICE VISIT (OUTPATIENT)
Dept: INTERNAL MEDICINE | Age: 72
End: 2022-09-13
Attending: INTERNAL MEDICINE

## 2022-09-13 ENCOUNTER — OFFICE VISIT (OUTPATIENT)
Dept: GASTROENTEROLOGY | Age: 72
End: 2022-09-13

## 2022-09-13 VITALS
WEIGHT: 219.8 LBS | TEMPERATURE: 97.4 F | RESPIRATION RATE: 14 BRPM | HEIGHT: 64 IN | BODY MASS INDEX: 37.52 KG/M2 | DIASTOLIC BLOOD PRESSURE: 87 MMHG | OXYGEN SATURATION: 98 % | SYSTOLIC BLOOD PRESSURE: 166 MMHG | HEART RATE: 83 BPM

## 2022-09-13 VITALS
TEMPERATURE: 97.7 F | DIASTOLIC BLOOD PRESSURE: 100 MMHG | SYSTOLIC BLOOD PRESSURE: 150 MMHG | BODY MASS INDEX: 37.84 KG/M2 | WEIGHT: 220.46 LBS

## 2022-09-13 DIAGNOSIS — K22.10 EROSIVE ESOPHAGITIS: ICD-10-CM

## 2022-09-13 DIAGNOSIS — I10 UNCONTROLLED HYPERTENSION: Primary | ICD-10-CM

## 2022-09-13 DIAGNOSIS — K22.2 GERD WITH STRICTURE: ICD-10-CM

## 2022-09-13 DIAGNOSIS — K21.9 GERD WITH STRICTURE: ICD-10-CM

## 2022-09-13 DIAGNOSIS — K21.00 GASTROESOPHAGEAL REFLUX DISEASE WITH ESOPHAGITIS WITHOUT HEMORRHAGE: Primary | ICD-10-CM

## 2022-09-13 PROCEDURE — 99213 OFFICE O/P EST LOW 20 MIN: CPT | Performed by: STUDENT IN AN ORGANIZED HEALTH CARE EDUCATION/TRAINING PROGRAM

## 2022-09-13 PROCEDURE — 99214 OFFICE O/P EST MOD 30 MIN: CPT | Performed by: INTERNAL MEDICINE

## 2022-09-13 RX ORDER — PANTOPRAZOLE SODIUM 40 MG/1
40 TABLET, DELAYED RELEASE ORAL DAILY
Qty: 180 TABLET | Refills: 0 | Status: SHIPPED | OUTPATIENT
Start: 2022-09-13 | End: 2023-07-17

## 2022-09-13 RX ORDER — AMLODIPINE BESYLATE 5 MG/1
5 TABLET ORAL DAILY
Qty: 30 TABLET | Refills: 11 | Status: SHIPPED | OUTPATIENT
Start: 2022-09-13 | End: 2022-09-13

## 2022-09-13 RX ORDER — AMLODIPINE BESYLATE 5 MG/1
5 TABLET ORAL DAILY
Qty: 90 TABLET | Refills: 0 | Status: SHIPPED | OUTPATIENT
Start: 2022-09-13 | End: 2023-03-28 | Stop reason: SDUPTHER

## 2022-09-13 ASSESSMENT — PATIENT HEALTH QUESTIONNAIRE - PHQ9
1. LITTLE INTEREST OR PLEASURE IN DOING THINGS: NOT AT ALL
SUM OF ALL RESPONSES TO PHQ9 QUESTIONS 1 AND 2: 0
SUM OF ALL RESPONSES TO PHQ9 QUESTIONS 1 AND 2: 0
2. FEELING DOWN, DEPRESSED OR HOPELESS: NOT AT ALL
CLINICAL INTERPRETATION OF PHQ2 SCORE: NO FURTHER SCREENING NEEDED

## 2022-09-16 ENCOUNTER — APPOINTMENT (OUTPATIENT)
Dept: GASTROENTEROLOGY | Age: 72
End: 2022-09-16

## 2022-09-19 ENCOUNTER — OFFICE VISIT (OUTPATIENT)
Dept: AUDIOLOGY | Age: 72
End: 2022-09-19

## 2022-09-19 DIAGNOSIS — H90.3 BILATERAL SENSORINEURAL HEARING LOSS: Primary | ICD-10-CM

## 2022-09-19 PROCEDURE — 92557 COMPREHENSIVE HEARING TEST: CPT | Performed by: AUDIOLOGIST

## 2022-10-24 ENCOUNTER — PATIENT OUTREACH (OUTPATIENT)
Dept: INTERNAL MEDICINE | Age: 72
End: 2022-10-24

## 2023-01-10 ENCOUNTER — HOSPITAL ENCOUNTER (OUTPATIENT)
Dept: MAMMOGRAPHY | Age: 73
Discharge: HOME | End: 2023-01-10
Attending: STUDENT IN AN ORGANIZED HEALTH CARE EDUCATION/TRAINING PROGRAM

## 2023-01-10 DIAGNOSIS — Z12.31 VISIT FOR SCREENING MAMMOGRAM: ICD-10-CM

## 2023-01-10 PROCEDURE — 77063 BREAST TOMOSYNTHESIS BI: CPT | Performed by: STUDENT IN AN ORGANIZED HEALTH CARE EDUCATION/TRAINING PROGRAM

## 2023-01-10 PROCEDURE — 77067 SCR MAMMO BI INCL CAD: CPT | Performed by: STUDENT IN AN ORGANIZED HEALTH CARE EDUCATION/TRAINING PROGRAM

## 2023-01-10 PROCEDURE — 77063 BREAST TOMOSYNTHESIS BI: CPT

## 2023-01-24 ENCOUNTER — TELEPHONE (OUTPATIENT)
Dept: INTERNAL MEDICINE | Age: 73
End: 2023-01-24

## 2023-01-25 ENCOUNTER — PREP FOR PROCEDURE (OUTPATIENT)
Dept: CARDIOTHORACIC SURGERY | Age: 73
End: 2023-01-25

## 2023-01-25 ENCOUNTER — INITIAL CONSULT (OUTPATIENT)
Dept: CARDIOTHORACIC SURGERY | Age: 73
End: 2023-01-25
Payer: MEDICARE

## 2023-01-25 VITALS
HEIGHT: 64 IN | SYSTOLIC BLOOD PRESSURE: 188 MMHG | BODY MASS INDEX: 33.63 KG/M2 | HEART RATE: 76 BPM | WEIGHT: 197 LBS | DIASTOLIC BLOOD PRESSURE: 93 MMHG

## 2023-01-25 DIAGNOSIS — I50.20 HEART FAILURE WITH REDUCED EJECTION FRACTION (HCC): ICD-10-CM

## 2023-01-25 DIAGNOSIS — I25.10 CAD, MULTIPLE VESSEL: ICD-10-CM

## 2023-01-25 DIAGNOSIS — Z01.818 PRE-OP TESTING: ICD-10-CM

## 2023-01-25 DIAGNOSIS — I25.10 CAD, MULTIPLE VESSEL: Primary | ICD-10-CM

## 2023-01-25 PROBLEM — R07.9 CHEST PAIN: Status: ACTIVE | Noted: 2023-01-25

## 2023-01-25 PROBLEM — R94.39 ABNORMAL STRESS TEST: Status: ACTIVE | Noted: 2023-01-25

## 2023-01-25 PROBLEM — I25.110 ATHEROSCLEROTIC HEART DISEASE OF NATIVE CORONARY ARTERY WITH UNSTABLE ANGINA PECTORIS (HCC): Status: ACTIVE | Noted: 2023-01-25

## 2023-01-25 PROCEDURE — 99205 OFFICE O/P NEW HI 60 MIN: CPT | Performed by: THORACIC SURGERY (CARDIOTHORACIC VASCULAR SURGERY)

## 2023-01-25 PROCEDURE — 1123F ACP DISCUSS/DSCN MKR DOCD: CPT | Performed by: THORACIC SURGERY (CARDIOTHORACIC VASCULAR SURGERY)

## 2023-01-25 RX ORDER — AMIODARONE HYDROCHLORIDE 200 MG/1
600 TABLET ORAL ONCE
Status: CANCELLED | OUTPATIENT
Start: 2023-01-25 | End: 2023-01-25

## 2023-01-25 RX ORDER — AMIODARONE HYDROCHLORIDE 200 MG/1
600 TABLET ORAL ONCE
Qty: 3 TABLET | Refills: 0 | Status: SHIPPED | OUTPATIENT
Start: 2023-01-25 | End: 2023-01-25

## 2023-01-25 RX ORDER — SODIUM CHLORIDE 0.9 % (FLUSH) 0.9 %
5-40 SYRINGE (ML) INJECTION PRN
Status: CANCELLED | OUTPATIENT
Start: 2023-01-25

## 2023-01-25 RX ORDER — SODIUM CHLORIDE 0.9 % (FLUSH) 0.9 %
5-40 SYRINGE (ML) INJECTION EVERY 12 HOURS SCHEDULED
Status: CANCELLED | OUTPATIENT
Start: 2023-01-25

## 2023-01-25 RX ORDER — SODIUM CHLORIDE 9 MG/ML
INJECTION, SOLUTION INTRAVENOUS PRN
Status: CANCELLED | OUTPATIENT
Start: 2023-01-25

## 2023-01-25 NOTE — PROGRESS NOTES
[address] [facility]  Angeles Williamson. Penelope Moder, MD Griffin Buerger. MD Zena Medrano S, PAGilbertC        Dany       [id number]  1/25/2023        1950        CHIEF COMPLAINT: abnormal ECG    HISTORY OF PRESENT ILLNESS:  The patient is a 67 y.o. female who is seen for evaluation of MVCAD, she was being worked up for back surgery and noted abnormal ECG, referred to cardiology, abnormal stress test, [facility] severe MVCAD, good LV, denies CP, SOB, tightness.     Past Medical History:   Diagnosis Date    Arthritis     osteo    CAD, multiple vessel 1/25/2023    Chronic pain     r/t arthritis in hip and knees    DDD (degenerative disc disease)     \"back\"    GERD (gastroesophageal reflux disease)     controlled w/med    Psychiatric disorder     PTSD, severe anxiety with medical procedures    Seizure ([address]     Thyroid disease     history of hypo in past. No current issues, no meds       Past Surgical History:   Procedure Laterality Date    JOINT REPLACEMENT Left 2018    SHOULDER SURGERY Left 2019    TOTAL HIP ARTHROPLASTY Right 04/2014       Family History   Problem Relation Age of Onset    Diabetes Mother     Osteoarthritis Mother     Osteoarthritis Father     Lung Disease Father     Cancer Mother        Social History     Socioeconomic History    Marital status:      Spouse name: Not on file    Number of children: Not on file    Years of education: Not on file    Highest education level: Not on file   Occupational History    Not on file   Tobacco Use    Smoking status: Never    Smokeless tobacco: Never   Substance and Sexual Activity    Alcohol use: Yes    Drug use: No    Sexual activity: Not on file   Other Topics Concern    Not on file   Social History Narrative    Not on file     Social Determinants of Health     Financial Resource Strain: Not on file   Food Insecurity: Not on file   Transportation Needs: Not on file   Physical Activity: Not on file   Stress: Not on file Social Connections: Not on file   Intimate Partner Violence: Not on file   Housing Stability: Not on file       Allergies   Allergen Reactions    Oxycodone Nausea And Vomiting and Other (See Comments)     \" incoherent \"     Sulfa Antibiotics Rash       Current Outpatient Medications on File Prior to Visit   Medication Sig Dispense Refill    acetaminophen (TYLENOL) 325 MG tablet Take 500 mg by mouth 2 times daily      hydrALAZINE (APRESOLINE) 10 MG tablet Take 10 mg by mouth every 8 hours as needed      ondansetron (ZOFRAN) 4 MG/2ML injection Infuse 4 mg intravenously every 6 hours as needed      tiZANidine (ZANAFLEX) 2 MG tablet TAKE 1 TABLET BY MOUTH THREE TIMES A DAY      tiZANidine (ZANAFLEX) 6 MG capsule Take 5 mg by mouth every 6 hours      DULoxetine (CYMBALTA) 30 MG extended release capsule Take 1 capsule by mouth 2 times daily 60 capsule 5    ALPRAZolam (XANAX) 1 MG tablet Take 1 tablet by mouth 1 hour prior to MRI      diphenhydrAMINE (BENADRYL) 25 MG capsule Take 25 mg by mouth      raNITIdine (ZANTAC) 150 MG tablet Take 150 mg by mouth daily       No current facility-administered medications on file prior to visit. REVIEW OF SYSTEMS:  GENERAL:  No weight loss. No fever. EYES:  No diplopia. No vision loss. ENTM:  No hearing loss. No trouble swallowing. No hoarseness. CARDIAC:  See present illness. PULMONARY:  Denies asthma or chronic pulmonary disease. GI:  No change in bowel habits. No bleeding. :  No dysuria. No history of renal stones or renal insufficiency. MS:  Denies osteoarthritis. NEURO:  No stroke or seizure disorder  HEME/LYMPHATIC:  No history of bleeding tendencies. PSYCHIATRIC:  No history of depression. PHYSICAL EXAMINATION:  GENERAL APPEARANCE:  Well developed. Well nourished. Alert, cooperative and oriented times three. HEENT:  Normocephalic. Extraocular muscles are intact. No scleral icterus. NECK/LYMPHATIC:  Supple with no carotic bruits.   No jugular venous distention. LUNGS: Lungs are clear to auscultation. HEART:  Heart is regular rate and rhythm without a murmur. ABDOMEN:  Soft and non-tender. SKIN:  No rashes, cyanosis, jaundice, ecchymoses or evidence of skin breakdown present. EXTREMITIES:  Full range of motion. No deformity. No peripheral edema. VASCULAR:  Pulses are full and equal at the radial arteries and the popliteal arteries bilaterally. There is no venous stasis disease. NEURO:  Grossly intact. IMPRESSION:  Encounter Diagnoses   Name Primary? CAD, multiple vessel     Heart failure with reduced ejection fraction (HCC)        PLAN:  I discussed in detail CAD, both the alternatives to and risks and benefits of CABG. Patient saw our teaching video.  Risk  include but are not limited to:  Bleeding  Infection including mediastinitis  Myocardial infarction  Stroke  Potential death  All questions answered      Meera Almaguer MD

## 2023-01-26 ENCOUNTER — ANESTHESIA EVENT (OUTPATIENT)
Dept: SURGERY | Age: 73
End: 2023-01-26
Payer: COMMERCIAL

## 2023-01-27 ENCOUNTER — HOSPITAL ENCOUNTER (OUTPATIENT)
Dept: PREADMISSION TESTING | Age: 73
End: 2023-01-27
Payer: COMMERCIAL

## 2023-01-27 ENCOUNTER — HOSPITAL ENCOUNTER (OUTPATIENT)
Dept: GENERAL RADIOLOGY | Age: 73
End: 2023-01-27
Payer: MEDICARE

## 2023-01-27 VITALS
DIASTOLIC BLOOD PRESSURE: 93 MMHG | HEART RATE: 75 BPM | WEIGHT: 196.5 LBS | TEMPERATURE: 97.9 F | HEIGHT: 64 IN | BODY MASS INDEX: 33.55 KG/M2 | SYSTOLIC BLOOD PRESSURE: 171 MMHG | OXYGEN SATURATION: 100 %

## 2023-01-27 DIAGNOSIS — Z01.818 PRE-OP TESTING: Primary | ICD-10-CM

## 2023-01-27 DIAGNOSIS — I25.10 CAD, MULTIPLE VESSEL: ICD-10-CM

## 2023-01-27 LAB
ALBUMIN SERPL-MCNC: 3.6 G/DL (ref 3.2–4.6)
ALBUMIN/GLOB SERPL: 1.2 (ref 0.4–1.6)
ALP SERPL-CCNC: 82 U/L (ref 50–130)
ALT SERPL-CCNC: 16 U/L (ref 12–65)
ANION GAP SERPL CALC-SCNC: 1 MMOL/L (ref 2–11)
APPEARANCE UR: ABNORMAL
APTT PPP: 27.5 SEC (ref 24.5–34.2)
AST SERPL-CCNC: 9 U/L (ref 15–37)
BACTERIA SPEC CULT: ABNORMAL
BACTERIA URNS QL MICRO: ABNORMAL /HPF
BILIRUB SERPL-MCNC: 0.4 MG/DL (ref 0.2–1.1)
BILIRUB UR QL: ABNORMAL
BUN SERPL-MCNC: 16 MG/DL (ref 8–23)
CALCIUM SERPL-MCNC: 9.9 MG/DL (ref 8.3–10.4)
CASTS URNS QL MICRO: 0 /LPF
CHLORIDE SERPL-SCNC: 109 MMOL/L (ref 101–110)
CO2 SERPL-SCNC: 30 MMOL/L (ref 21–32)
COLOR UR: ABNORMAL
CREAT SERPL-MCNC: 0.84 MG/DL (ref 0.6–1)
CRYSTALS URNS QL MICRO: 0 /LPF
EKG ATRIAL RATE: 62 BPM
EKG DIAGNOSIS: NORMAL
EKG P AXIS: 65 DEGREES
EKG P-R INTERVAL: 156 MS
EKG Q-T INTERVAL: 426 MS
EKG QRS DURATION: 92 MS
EKG QTC CALCULATION (BAZETT): 432 MS
EKG R AXIS: -3 DEGREES
EKG T AXIS: 125 DEGREES
EKG VENTRICULAR RATE: 62 BPM
EPI CELLS #/AREA URNS HPF: ABNORMAL /HPF
EST. AVERAGE GLUCOSE BLD GHB EST-MCNC: 117 MG/DL
GLOBULIN SER CALC-MCNC: 3.1 G/DL (ref 2.8–4.5)
GLUCOSE SERPL-MCNC: 85 MG/DL (ref 65–100)
GLUCOSE UR STRIP.AUTO-MCNC: NEGATIVE MG/DL
HBA1C MFR BLD: 5.7 % (ref 4.8–5.6)
HGB UR QL STRIP: NEGATIVE
INR PPP: 1
KETONES UR QL STRIP.AUTO: ABNORMAL MG/DL
LEUKOCYTE ESTERASE UR QL STRIP.AUTO: ABNORMAL
MAGNESIUM SERPL-MCNC: 2.3 MG/DL (ref 1.8–2.4)
MUCOUS THREADS URNS QL MICRO: 0 /LPF
NITRITE UR QL STRIP.AUTO: NEGATIVE
PH UR STRIP: 5 (ref 5–9)
POTASSIUM SERPL-SCNC: 4 MMOL/L (ref 3.5–5.1)
PROT SERPL-MCNC: 6.7 G/DL (ref 6.3–8.2)
PROT UR STRIP-MCNC: ABNORMAL MG/DL
PROTHROMBIN TIME: 13.6 SEC (ref 12.6–14.3)
RBC #/AREA URNS HPF: 0 /HPF
SERVICE CMNT-IMP: ABNORMAL
SODIUM SERPL-SCNC: 140 MMOL/L (ref 133–143)
SP GR UR REFRACTOMETRY: 1.02 (ref 1–1.02)
UROBILINOGEN UR QL STRIP.AUTO: 0.2 EU/DL (ref 0.2–1)
WBC URNS QL MICRO: >100 /HPF

## 2023-01-27 PROCEDURE — 80053 COMPREHEN METABOLIC PANEL: CPT

## 2023-01-27 PROCEDURE — 81015 MICROSCOPIC EXAM OF URINE: CPT

## 2023-01-27 PROCEDURE — 71046 X-RAY EXAM CHEST 2 VIEWS: CPT

## 2023-01-27 PROCEDURE — 83735 ASSAY OF MAGNESIUM: CPT

## 2023-01-27 PROCEDURE — 85730 THROMBOPLASTIN TIME PARTIAL: CPT

## 2023-01-27 PROCEDURE — 81001 URINALYSIS AUTO W/SCOPE: CPT

## 2023-01-27 PROCEDURE — 83036 HEMOGLOBIN GLYCOSYLATED A1C: CPT

## 2023-01-27 PROCEDURE — 85610 PROTHROMBIN TIME: CPT

## 2023-01-27 PROCEDURE — 87641 MR-STAPH DNA AMP PROBE: CPT

## 2023-01-27 PROCEDURE — 93005 ELECTROCARDIOGRAM TRACING: CPT

## 2023-01-27 RX ORDER — MELOXICAM 7.5 MG/1
7.5 TABLET ORAL 2 TIMES DAILY
Status: ON HOLD | COMMUNITY

## 2023-01-27 RX ORDER — METOPROLOL SUCCINATE 25 MG/1
25 TABLET, EXTENDED RELEASE ORAL DAILY
Status: ON HOLD | COMMUNITY

## 2023-01-27 ASSESSMENT — PAIN SCALES - GENERAL: PAINLEVEL_OUTOF10: 8

## 2023-01-27 ASSESSMENT — PAIN DESCRIPTION - DESCRIPTORS: DESCRIPTORS: ACHING

## 2023-01-27 ASSESSMENT — PAIN DESCRIPTION - PAIN TYPE: TYPE: CHRONIC PAIN

## 2023-01-27 ASSESSMENT — PAIN DESCRIPTION - FREQUENCY: FREQUENCY: CONTINUOUS

## 2023-01-27 ASSESSMENT — PAIN DESCRIPTION - LOCATION: LOCATION: BACK

## 2023-01-27 ASSESSMENT — PAIN DESCRIPTION - ORIENTATION: ORIENTATION: LOWER

## 2023-01-27 ASSESSMENT — PAIN - FUNCTIONAL ASSESSMENT: PAIN_FUNCTIONAL_ASSESSMENT: PREVENTS OR INTERFERES WITH MANY ACTIVE NOT PASSIVE ACTIVITIES

## 2023-01-27 NOTE — PERIOP NOTE
Latest Reference Range & Units 1/27/23 09:10   Sodium 133 - 143 mmol/L 140   Potassium 3.5 - 5.1 mmol/L 4.0   Chloride 101 - 110 mmol/L 109   CO2 21 - 32 mmol/L 30   BUN,BUNPL 8 - 23 MG/DL 16   Creatinine 0.6 - 1.0 MG/DL 0.84   Anion Gap 2 - 11 mmol/L 1 (L)   Est, Glom Filt Rate >60 ml/min/1.73m2 >60   Magnesium 1.8 - 2.4 mg/dL 2.3   Glucose, Random 65 - 100 mg/dL 85   CALCIUM, SERUM, 039955 8.3 - 10.4 MG/DL 9.9   ALBUMIN/GLOBULIN RATIO 0.4 - 1.6   1.2   Total Protein 6.3 - 8.2 g/dL 6.7   Albumin 3.2 - 4.6 g/dL 3.6   Globulin 2.8 - 4.5 g/dL 3.1   Alk Phosphatase 50 - 130 U/L 82   ALT 12 - 65 U/L 16   AST 15 - 37 U/L 9 (L)   Bilirubin 0.2 - 1.1 MG/DL 0.4   Hemoglobin A1C 4.8 - 5.6 % 5.7 (H)   eAG (mg/dL) mg/dL 117   (L): Data is abnormally low  (H): Data is abnormally high   Latest Reference Range & Units 1/27/23 09:10   Prothrombin Time 12.6 - 14.3 sec 13.6   INR -   1.0   PTT 24.5 - 34.2 SEC 27.5      Latest Reference Range & Units 1/27/23 09:10   Color, UA -   DARK YELLOW   Glucose, UA mg/dL Negative   Bilirubin, Urine NEG   SMALL ! Ketones, Urine NEG mg/dL TRACE ! Specific Gravity, UA 1.001 - 1.023   1.022   Blood, Urine NEG   Negative   Protein, UA NEG mg/dL TRACE ! Urobilinogen, Urine 0.2 - 1.0 EU/dL 0.2   Nitrite, Urine NEG   Negative   Leukocyte Esterase, Urine NEG   MODERATE ! Appearance -   CLOUDY   pH, Urine 5.0 - 9.0   5.0   Casts 0 /lpf 0   Mucus, UA 0 /lpf 0   WBC, UA 0 /hpf >100   RBC, UA 0 /hpf 0   Epithelial Cells, UA 0 /hpf 0-3   Bacteria, UA 0 /hpf 2+ (H)   Crystals 0 /LPF 0   !: Data is abnormal  (H): Data is abnormally high    Labs reviewed and routed to Dr. Rafael Baumann.  UA results E-mailed to [facility], [address]

## 2023-01-27 NOTE — PERIOP NOTE
PLEASE CONTINUE TAKING ALL PRESCRIPTION MEDICATIONS UP TO THE DAY OF SURGERY UNLESS OTHERWISE DIRECTED BELOW. DISCONTINUE all vitamins and supplements 7 days prior to surgery. DISCONTINUE Non-Steriodal Anti-Inflammatory (NSAIDS) such as Advil and Aleve 5 days prior to surgery. Home Medications to take  the day of surgery    Duloxetine (Cymbalta)    Metoprolol succinate (Toprol XL)           Home Medications   to Hold   Tizanidine (Zanaflex) hold morning of surgery  Meloxicam (Mobic) hold 5 days prior to surgery        Comments   Amiodarone (Cordarone) 200 mg - take 3 tablets after 4 pm the evening before surgery. This medication has been called in to your pharmacy for you to     Monday 1/30/23, please return to [address] , [address] [place] [address], [address] to have labs drawn for your Type & Screen and  Type & Cross. Lab hours are 8 am-3 pm.      On the day before surgery please take Acetaminophen 1000mg in the morning and then again before bed. You may substitute for Tylenol 650 mg. Please do not bring home medications with you on the day of surgery unless otherwise directed by your nurse. If you are instructed to bring home medications, please give them to your nurse as they will be administered by the nursing staff. If you have any questions, please call [address] [place] [address] [phone] or [facility] [facility] [phone]. A copy of this note was provided to the patient for reference.

## 2023-01-27 NOTE — PERIOP NOTE
[place], [address] notified of UA results with 2+ Bacteria. Telephone order received for Urine culture. Placed call to lab spoke with Tangela. Urine culture to be run on Urine specimen in lab. Also notified [place], PA of patient's /93 during PAT assessment. Patient was very anxious ( has Hx of PTSD with medical procedures and visits) and rated lower back pain as 8 / 10. Denied chest pain.

## 2023-01-27 NOTE — PERIOP NOTE
Patient verified name and     Order for consent was found in EHR and matches case posting; patient verified. Type III surgery, walk in assessment complete. Labs per surgeon: CMP, PT/INR, APTT, Mag, A1c, UA, CXR, MRSA,Prepare  (crossmatch) RBC's , Platelets, FFP, , T & S  Labs per anesthesia protocol: CBC; results from 23 in Care Every where and acceptable per anesthesia protocol  EKG: done today     MRSA/MSSA swab collected; pharmacy to review and dose antibiotic as appropriate. Hospital approved surgical skin cleanser and instructions given per hospital policy. Patient provided with and instructed on educational handouts including Guide to Surgery, Pain Management, Hand Hygiene, Blood Transfusion Education, and [facility] Brochure. Patient answered medical/surgical history questions at their best of ability. All prior to admission medications documented in [facility]. Original medication prescription bottle were visualized during patient appointment. Patient instructed to hold all vitamins 7 days prior to surgery and NSAIDS 5 days prior to surgery, patient verbalized understanding. Patient teach back successful and patient demonstrates knowledge of instructions.

## 2023-01-30 ENCOUNTER — HOSPITAL ENCOUNTER (OUTPATIENT)
Dept: LAB | Age: 73
Discharge: HOME | End: 2023-02-02

## 2023-01-30 DIAGNOSIS — Z01.818 PRE-OP TESTING: ICD-10-CM

## 2023-01-30 LAB — HISTORY CHECK: NORMAL

## 2023-01-30 PROCEDURE — 86900 BLOOD TYPING SEROLOGIC ABO: CPT

## 2023-01-30 PROCEDURE — 36415 COLL VENOUS BLD VENIPUNCTURE: CPT

## 2023-01-30 PROCEDURE — 86923 COMPATIBILITY TEST ELECTRIC: CPT

## 2023-01-30 NOTE — ANESTHESIA PRE PROCEDURE
Department of Anesthesiology  Preprocedure Note       Name:  Nataliia Dozier   Age:  67 y.o.  :  1950                                          MRN:  [id number]         Date:  2023      Surgeon: Josiah Gomez):  Ghislaine Estrella MD    Procedure: Procedure(s):  CABG CORONARY ARTERY BYPASS    Medications prior to admission:   Prior to Admission medications    Medication Sig Start Date End Date Taking? Authorizing Provider   meloxicam (MOBIC) 7.5 MG tablet Take 7.5 mg by mouth in the morning and at bedtime    Historical Provider, MD   metoprolol succinate (TOPROL XL) 25 MG extended release tablet Take 25 mg by mouth daily    Historical Provider, MD   amiodarone (CORDARONE) 200 MG tablet Take 3 tablets by mouth once for 1 dose Take 3 tablets after 4pm the evening before surgery. 23  ALICIA Curry   DULoxetine (CYMBALTA) 30 MG extended release capsule Take 1 capsule by mouth 2 times daily 22  Riddhi Dietz MD   acetaminophen (TYLENOL) 325 MG tablet Take 500 mg by mouth 2 times daily    Ar Automatic Reconciliation   ALPRAZolam (XANAX) 1 MG tablet Take 1 tablet by mouth 1 hour prior to MRI 3/1/22   Ar Automatic Reconciliation   diphenhydrAMINE (BENADRYL) 25 MG capsule Take 25 mg by mouth    Ar Automatic Reconciliation   ondansetron (ZOFRAN) 4 MG/2ML injection Infuse 4 mg intravenously every 6 hours as needed 10/12/20   Ar Automatic Reconciliation   raNITIdine (ZANTAC) 150 MG tablet Take 150 mg by mouth daily    Ar Automatic Reconciliation   tiZANidine (ZANAFLEX) 2 MG tablet every 8 hours as needed TAKE 1 TABLET BY MOUTH THREE TIMES A DAY 21   Ar Automatic Reconciliation       Current medications:    No current facility-administered medications for this encounter.      Current Outpatient Medications   Medication Sig Dispense Refill    meloxicam (MOBIC) 7.5 MG tablet Take 7.5 mg by mouth in the morning and at bedtime      metoprolol succinate (TOPROL XL) 25 MG extended release tablet Take 25 mg by mouth daily      amiodarone (CORDARONE) 200 MG tablet Take 3 tablets by mouth once for 1 dose Take 3 tablets after 4pm the evening before surgery. 3 tablet 0    DULoxetine (CYMBALTA) 30 MG extended release capsule Take 1 capsule by mouth 2 times daily 60 capsule 5    acetaminophen (TYLENOL) 325 MG tablet Take 500 mg by mouth 2 times daily      ALPRAZolam (XANAX) 1 MG tablet Take 1 tablet by mouth 1 hour prior to MRI      diphenhydrAMINE (BENADRYL) 25 MG capsule Take 25 mg by mouth      ondansetron (ZOFRAN) 4 MG/2ML injection Infuse 4 mg intravenously every 6 hours as needed      raNITIdine (ZANTAC) 150 MG tablet Take 150 mg by mouth daily      tiZANidine (ZANAFLEX) 2 MG tablet every 8 hours as needed TAKE 1 TABLET BY MOUTH THREE TIMES A DAY         Allergies:     Allergies   Allergen Reactions    Contrast [Barium-Containing Compounds] Other (See Comments)     \"Blood Poisoning\"  States, Can take if premedicated with benadryl and prednisone    Oxycodone Nausea And Vomiting and Other (See Comments)     \" incoherent \"     Sulfa Antibiotics Rash       Problem List:    Patient Active Problem List   Diagnosis Code    S/P total knee arthroplasty Z96.659    Syncope R55    Closed fracture of left proximal humerus S42.202A    Leukocytosis D72.829    Seizure ([address] R56.9    Osteoarthritis M19.90    Status post hip replacement Z96.649    CAD, multiple vessel I25.10    Heart failure with reduced ejection fraction ([address] I50.20    Atherosclerotic heart disease of native coronary artery with unstable angina pectoris ([address] I25.110    Chest pain R07.9    Abnormal stress test R94.39       Past Medical History:        Diagnosis Date    Arthritis     osteo    CAD, multiple vessel 01/25/2023    Chronic pain     r/t arthritis in hip and knees    COVID-19 01/2020    never formally tested, reports symptoms of severe headache, fevers, loss of taste and smell    DDD (degenerative disc disease)     \"back\", chronic pain in lower back    GERD (gastroesophageal reflux disease)     controlled w/ OTC med    Psychiatric disorder     PTSD, severe anxiety with medical procedures    Seizure [facility])     patient denies seizure, states was evaluated by Neurologist and was told she only had syncopal episode    Spondylolisthesis of lumbar region     severe spinal canal stenosis L4-5    Thyroid disease     history of hypo in past. No current issues, no meds       Past Surgical History:        Procedure Laterality Date    JOINT REPLACEMENT Left 2018    SHOULDER SURGERY Left 2019    TOTAL HIP ARTHROPLASTY Right 04/2014       Social History:    Social History     Tobacco Use    Smoking status: Never    Smokeless tobacco: Never   Substance Use Topics    Alcohol use: Yes     Comment: rarely only on social occasions                                Counseling given: Not Answered      Vital Signs (Current): There were no vitals filed for this visit.                                            BP Readings from Last 3 Encounters:   01/27/23 (!) 171/93   01/25/23 (!) 188/93       NPO Status:                                                                                 BMI:   Wt Readings from Last 3 Encounters:   01/27/23 196 lb 8 oz (89.1 kg)   01/25/23 197 lb (89.4 kg)   05/24/22 220 lb (99.8 kg)     There is no height or weight on file to calculate BMI.    CBC:   Lab Results   Component Value Date/Time    WBC 11.2 10/11/2020 03:18 AM    RBC 4.53 10/11/2020 03:18 AM    HGB 12.1 10/11/2020 03:18 AM    HCT 37.1 10/11/2020 03:18 AM    MCV 81.9 10/11/2020 03:18 AM    RDW 12.8 10/11/2020 03:18 AM     10/11/2020 03:18 AM       CMP:   Lab Results   Component Value Date/Time     01/27/2023 09:10 AM    K 4.0 01/27/2023 09:10 AM     01/27/2023 09:10 AM    CO2 30 01/27/2023 09:10 AM    BUN 16 01/27/2023 09:10 AM    CREATININE 0.84 01/27/2023 09:10 AM    GFRAA >60 10/13/2020 06:05 AM    AGRATIO 1.1 10/10/2020 05:47 PM    LABGLOM >60 01/27/2023 09:10 AM    GLUCOSE 85 01/27/2023 09:10 AM    PROT 6.7 01/27/2023 09:10 AM    CALCIUM 9.9 01/27/2023 09:10 AM    BILITOT 0.4 01/27/2023 09:10 AM    ALKPHOS 82 01/27/2023 09:10 AM    ALKPHOS 79 10/10/2020 05:47 PM    AST 9 01/27/2023 09:10 AM    ALT 16 01/27/2023 09:10 AM       POC Tests: No results for input(s): POCGLU, POCNA, POCK, POCCL, POCBUN, POCHEMO, POCHCT in the last 72 hours. Coags:   Lab Results   Component Value Date/Time    PROTIME 13.6 01/27/2023 09:10 AM    INR 1.0 01/27/2023 09:10 AM    APTT 27.5 01/27/2023 09:10 AM       HCG (If Applicable): No results found for: PREGTESTUR, PREGSERUM, HCG, HCGQUANT     ABGs: No results found for: PHART, PO2ART, LVO7NRU, JLJ0TLL, BEART, K5ZTHGMV     Type & Screen (If Applicable):  No results found for: LABABO, LABRH    Drug/Infectious Status (If Applicable):  No results found for: HIV, HEPCAB    COVID-19 Screening (If Applicable): No results found for: COVID19        Anesthesia Evaluation  Patient summary reviewed  Airway: Mallampati: II  TM distance: >3 FB   Neck ROM: full  Mouth opening: > = 3 FB   Dental: normal exam         Pulmonary:normal exam                               Cardiovascular:  Exercise tolerance: poor (<4 METS),   (+) angina:, CAD:, CHF: systolic,                ROS comment: TTE 1/10/2023:    Moderately reduced left ventricular systolic function with a visually   estimated EF of 35 - 40%.   Mildly dilated left ventricle.   The Posterior wall is akinetic.   Mild tricuspid valve regurgitation. Cath 1/10/2023: MV CAD     Neuro/Psych:   (+) psychiatric history (PTSD):            GI/Hepatic/Renal:   (+) GERD:,           Endo/Other:    (+) hypothyroidism::., .                 Abdominal:             Vascular:           Other Findings:           Anesthesia Plan      general     ASA 4         arterial line, central line, YAQUELIN, CVP, BIS and continuous noninvasive hemodynamic monitor    Anesthetic plan and risks discussed with patient.                         Mehrdad Luna MD   1/30/2023

## 2023-01-31 ENCOUNTER — HOSPITAL ENCOUNTER (INPATIENT)
Age: 73
LOS: 6 days | Discharge: HOME HEALTH | DRG: 236 | End: 2023-02-06
Attending: THORACIC SURGERY (CARDIOTHORACIC VASCULAR SURGERY) | Admitting: THORACIC SURGERY (CARDIOTHORACIC VASCULAR SURGERY)
Payer: COMMERCIAL

## 2023-01-31 ENCOUNTER — APPOINTMENT (OUTPATIENT)
Dept: GENERAL RADIOLOGY | Age: 73
DRG: 236 | End: 2023-01-31
Attending: THORACIC SURGERY (CARDIOTHORACIC VASCULAR SURGERY)
Payer: COMMERCIAL

## 2023-01-31 ENCOUNTER — ANESTHESIA (OUTPATIENT)
Dept: SURGERY | Age: 73
End: 2023-01-31
Payer: COMMERCIAL

## 2023-01-31 DIAGNOSIS — Z95.1 S/P CABG X 5: Primary | ICD-10-CM

## 2023-01-31 DIAGNOSIS — R94.39 ABNORMAL STRESS TEST: ICD-10-CM

## 2023-01-31 LAB
ACT AVERAGE - AAVG: 119 SEC
ACT AVERAGE - AAVG: 583 SEC
ACT AVERAGE - AAVG: 594 SEC
ACT AVERAGE - AAVG: 638 SEC
ANION GAP SERPL CALC-SCNC: 7 MMOL/L (ref 2–11)
APPEARANCE UR: CLEAR
APTT PPP: 33.3 SEC (ref 24.5–34.2)
ARTERIAL PATENCY WRIST A: ABNORMAL
BACTERIA URNS QL MICRO: NEGATIVE /HPF
BASE DEFICIT BLD-SCNC: 0.1 MMOL/L
BASE DEFICIT BLD-SCNC: 2.9 MMOL/L
BASE DEFICIT BLD-SCNC: 2.9 MMOL/L
BASE DEFICIT BLD-SCNC: 3.1 MMOL/L
BASE DEFICIT BLD-SCNC: 3.2 MMOL/L
BASE DEFICIT BLD-SCNC: 3.4 MMOL/L
BASE DEFICIT BLD-SCNC: 4.4 MMOL/L
BASELINE ACT: 131 SEC
BDY SITE: ABNORMAL
BILIRUB UR QL: NEGATIVE
BUN SERPL-MCNC: 13 MG/DL (ref 8–23)
CA-I BLD-MCNC: 1.15 MMOL/L (ref 1.12–1.32)
CA-I BLD-MCNC: 1.17 MMOL/L (ref 1.12–1.32)
CA-I BLD-MCNC: 1.18 MMOL/L (ref 1.12–1.32)
CA-I BLD-MCNC: 1.23 MMOL/L (ref 1.12–1.32)
CA-I BLD-MCNC: 1.23 MMOL/L (ref 1.12–1.32)
CA-I BLD-MCNC: 1.33 MMOL/L (ref 1.12–1.32)
CALCIUM SERPL-MCNC: 8.3 MG/DL (ref 8.3–10.4)
CASTS URNS QL MICRO: ABNORMAL /LPF
CHLORIDE SERPL-SCNC: 116 MMOL/L (ref 101–110)
CO2 BLD-SCNC: 20 MMOL/L (ref 13–23)
CO2 BLD-SCNC: 21 MMOL/L (ref 13–23)
CO2 BLD-SCNC: 22 MMOL/L (ref 13–23)
CO2 BLD-SCNC: 23 MMOL/L (ref 13–23)
CO2 SERPL-SCNC: 23 MMOL/L (ref 21–32)
COLOR UR: ABNORMAL
CREAT SERPL-MCNC: 0.8 MG/DL (ref 0.6–1)
EKG ATRIAL RATE: 48 BPM
EKG DIAGNOSIS: NORMAL
EKG P AXIS: 89 DEGREES
EKG P-R INTERVAL: 168 MS
EKG Q-T INTERVAL: 564 MS
EKG QRS DURATION: 88 MS
EKG QTC CALCULATION (BAZETT): 503 MS
EKG R AXIS: 32 DEGREES
EKG T AXIS: 193 DEGREES
EKG VENTRICULAR RATE: 48 BPM
EPI CELLS #/AREA URNS HPF: ABNORMAL /HPF
ERYTHROCYTE [DISTWIDTH] IN BLOOD BY AUTOMATED COUNT: 14.2 % (ref 11.9–14.6)
ERYTHROCYTE [DISTWIDTH] IN BLOOD BY AUTOMATED COUNT: 14.4 % (ref 11.9–14.6)
FIBRINOGEN PPP-MCNC: 142 MG/DL (ref 190–501)
GAS FLOW.O2 O2 DELIVERY SYS: ABNORMAL
GLUCOSE BLD STRIP.AUTO-MCNC: 105 MG/DL (ref 65–100)
GLUCOSE BLD STRIP.AUTO-MCNC: 108 MG/DL (ref 65–100)
GLUCOSE BLD STRIP.AUTO-MCNC: 116 MG/DL (ref 65–100)
GLUCOSE BLD STRIP.AUTO-MCNC: 116 MG/DL (ref 65–100)
GLUCOSE BLD STRIP.AUTO-MCNC: 126 MG/DL (ref 65–100)
GLUCOSE BLD STRIP.AUTO-MCNC: 127 MG/DL (ref 65–100)
GLUCOSE BLD STRIP.AUTO-MCNC: 139 MG/DL (ref 65–100)
GLUCOSE BLD STRIP.AUTO-MCNC: 140 MG/DL (ref 65–100)
GLUCOSE BLD STRIP.AUTO-MCNC: 143 MG/DL (ref 65–100)
GLUCOSE BLD STRIP.AUTO-MCNC: 144 MG/DL (ref 65–100)
GLUCOSE BLD STRIP.AUTO-MCNC: 146 MG/DL (ref 65–100)
GLUCOSE BLD STRIP.AUTO-MCNC: 152 MG/DL (ref 65–100)
GLUCOSE BLD STRIP.AUTO-MCNC: 164 MG/DL (ref 65–100)
GLUCOSE BLD STRIP.AUTO-MCNC: 58 MG/DL (ref 65–100)
GLUCOSE BLD STRIP.AUTO-MCNC: 86 MG/DL (ref 65–100)
GLUCOSE SERPL-MCNC: 112 MG/DL (ref 65–100)
GLUCOSE UR STRIP.AUTO-MCNC: NEGATIVE MG/DL
HCO3 BLD-SCNC: 20.8 MMOL/L (ref 22–26)
HCO3 BLD-SCNC: 22.1 MMOL/L (ref 22–26)
HCO3 BLD-SCNC: 22.3 MMOL/L (ref 22–26)
HCO3 BLD-SCNC: 22.4 MMOL/L (ref 22–26)
HCO3 BLD-SCNC: 22.6 MMOL/L (ref 22–26)
HCO3 BLD-SCNC: 22.6 MMOL/L (ref 22–26)
HCO3 BLD-SCNC: 23.7 MMOL/L (ref 22–26)
HCT VFR BLD AUTO: 27.9 % (ref 35.8–46.3)
HCT VFR BLD AUTO: 29 % (ref 35.8–46.3)
HCT VFR BLD AUTO: 31.9 % (ref 35.8–46.3)
HEPARIN BOLUS-PATIENT: NORMAL UNITS
HEPARIN BOLUS-PUMP: 0 UNITS
HEPARIN BOLUS-TOTAL: NORMAL UNITS
HEPARIN REQUIRED-PATIENT: 1647
HEPARIN REQUIRED-TOTAL: 2000 UNITS
HEPARIN TEST CONCENTRATE: 2.5 MG/KG
HGB BLD-MCNC: 10.2 G/DL (ref 11.7–15.4)
HGB BLD-MCNC: 8.9 G/DL (ref 11.7–15.4)
HGB BLD-MCNC: 9.4 G/DL (ref 11.7–15.4)
HGB UR QL STRIP: NEGATIVE
INR PPP: 1.6
KETONES UR QL STRIP.AUTO: NEGATIVE MG/DL
LEUKOCYTE ESTERASE UR QL STRIP.AUTO: ABNORMAL
MAGNESIUM SERPL-MCNC: 2.3 MG/DL (ref 1.8–2.4)
MAGNESIUM SERPL-MCNC: 2.5 MG/DL (ref 1.8–2.4)
MAGNESIUM SERPL-MCNC: 2.9 MG/DL (ref 1.8–2.4)
MCH RBC QN AUTO: 27.9 PG (ref 26.1–32.9)
MCH RBC QN AUTO: 27.9 PG (ref 26.1–32.9)
MCHC RBC AUTO-ENTMCNC: 32 G/DL (ref 31.4–35)
MCHC RBC AUTO-ENTMCNC: 32.4 G/DL (ref 31.4–35)
MCV RBC AUTO: 86.1 FL (ref 82–102)
MCV RBC AUTO: 87.2 FL (ref 82–102)
MUCOUS THREADS URNS QL MICRO: 0 /LPF
NITRITE UR QL STRIP.AUTO: NEGATIVE
NRBC # BLD: 0 K/UL (ref 0–0.2)
NRBC # BLD: 0 K/UL (ref 0–0.2)
O2/TOTAL GAS SETTING VFR VENT: 100 %
PCO2 BLD: 34.9 MMHG (ref 35–45)
PCO2 BLD: 37.9 MMHG (ref 35–45)
PCO2 BLD: 39.6 MMHG (ref 35–45)
PCO2 BLD: 40.1 MMHG (ref 35–45)
PCO2 BLD: 41.9 MMHG (ref 35–45)
PCO2 BLD: 42.2 MMHG (ref 35–45)
PCO2 BLD: 43.2 MMHG (ref 35–45)
PEEP RESPIRATORY: 8 CMH2O
PH BLD: 7.32 (ref 7.35–7.45)
PH BLD: 7.34 (ref 7.35–7.45)
PH BLD: 7.34 (ref 7.35–7.45)
PH BLD: 7.35 (ref 7.35–7.45)
PH BLD: 7.35 (ref 7.35–7.45)
PH BLD: 7.36 (ref 7.35–7.45)
PH BLD: 7.44 (ref 7.35–7.45)
PH UR STRIP: 5.5 (ref 5–9)
PLATELET # BLD AUTO: 158 K/UL (ref 150–450)
PLATELET # BLD AUTO: 232 K/UL (ref 150–450)
PMV BLD AUTO: 10.8 FL (ref 9.4–12.3)
PMV BLD AUTO: 11.1 FL (ref 9.4–12.3)
PO2 BLD: 207 MMHG (ref 75–100)
PO2 BLD: 234 MMHG (ref 75–100)
PO2 BLD: 250 MMHG (ref 75–100)
PO2 BLD: 257 MMHG (ref 75–100)
PO2 BLD: 258 MMHG (ref 75–100)
PO2 BLD: 383 MMHG (ref 75–100)
PO2 BLD: 413 MMHG (ref 75–100)
POTASSIUM BLD-SCNC: 3.5 MMOL/L (ref 3.5–5.1)
POTASSIUM BLD-SCNC: 3.7 MMOL/L (ref 3.5–5.1)
POTASSIUM BLD-SCNC: 3.8 MMOL/L (ref 3.5–5.1)
POTASSIUM BLD-SCNC: 4.2 MMOL/L (ref 3.5–5.1)
POTASSIUM BLD-SCNC: 4.2 MMOL/L (ref 3.5–5.1)
POTASSIUM BLD-SCNC: 4.8 MMOL/L (ref 3.5–5.1)
POTASSIUM SERPL-SCNC: 3.8 MMOL/L (ref 3.5–5.1)
POTASSIUM SERPL-SCNC: 4.1 MMOL/L (ref 3.5–5.1)
POTASSIUM SERPL-SCNC: 4.6 MMOL/L (ref 3.5–5.1)
PROJECTED HEPARIN CONCENTATION: 2.8 MG/KG
PROT UR STRIP-MCNC: NEGATIVE MG/DL
PROTAMINE DOSE-PUMP: 34 MG
PROTAMINE DOSE-TOTAL: 193 MG
PROTHROMBIN TIME: 19.6 SEC (ref 12.6–14.3)
RBC # BLD AUTO: 3.37 M/UL (ref 4.05–5.2)
RBC # BLD AUTO: 3.66 M/UL (ref 4.05–5.2)
RBC #/AREA URNS HPF: ABNORMAL /HPF
SAO2 % BLD: 100 %
SAO2 % BLD: 100 % (ref 95–98)
SERVICE CMNT-IMP: ABNORMAL
SERVICE CMNT-IMP: NORMAL
SLOPE: 93
SODIUM BLD-SCNC: 138 MMOL/L (ref 136–145)
SODIUM BLD-SCNC: 141 MMOL/L (ref 136–145)
SODIUM BLD-SCNC: 141 MMOL/L (ref 136–145)
SODIUM BLD-SCNC: 142 MMOL/L (ref 136–145)
SODIUM BLD-SCNC: 143 MMOL/L (ref 136–145)
SODIUM BLD-SCNC: 143 MMOL/L (ref 136–145)
SODIUM SERPL-SCNC: 146 MMOL/L (ref 133–143)
SP GR UR REFRACTOMETRY: 1.02 (ref 1–1.02)
SPECIMEN SITE: ABNORMAL
SPECIMEN TYPE: ABNORMAL
URINE CULTURE IF INDICATED: ABNORMAL
UROBILINOGEN UR QL STRIP.AUTO: 0.2 EU/DL (ref 0.2–1)
VENTILATION MODE VENT: ABNORMAL
VT SETTING VENT: 420 ML
WBC # BLD AUTO: 17.8 K/UL (ref 4.3–11.1)
WBC # BLD AUTO: 25.7 K/UL (ref 4.3–11.1)
WBC URNS QL MICRO: ABNORMAL /HPF

## 2023-01-31 PROCEDURE — 2500000003 HC RX 250 WO HCPCS: Performed by: NURSE ANESTHETIST, CERTIFIED REGISTERED

## 2023-01-31 PROCEDURE — 84132 ASSAY OF SERUM POTASSIUM: CPT

## 2023-01-31 PROCEDURE — 2500000003 HC RX 250 WO HCPCS: Performed by: PHYSICIAN ASSISTANT

## 2023-01-31 PROCEDURE — A4216 STERILE WATER/SALINE, 10 ML: HCPCS | Performed by: PHYSICIAN ASSISTANT

## 2023-01-31 PROCEDURE — 2580000003 HC RX 258

## 2023-01-31 PROCEDURE — 85018 HEMOGLOBIN: CPT

## 2023-01-31 PROCEDURE — 6360000002 HC RX W HCPCS: Performed by: THORACIC SURGERY (CARDIOTHORACIC VASCULAR SURGERY)

## 2023-01-31 PROCEDURE — 85730 THROMBOPLASTIN TIME PARTIAL: CPT

## 2023-01-31 PROCEDURE — 02100Z9 BYPASS CORONARY ARTERY, ONE ARTERY FROM LEFT INTERNAL MAMMARY, OPEN APPROACH: ICD-10-PCS | Performed by: THORACIC SURGERY (CARDIOTHORACIC VASCULAR SURGERY)

## 2023-01-31 PROCEDURE — 6370000000 HC RX 637 (ALT 250 FOR IP): Performed by: PHYSICIAN ASSISTANT

## 2023-01-31 PROCEDURE — 5A1221Z PERFORMANCE OF CARDIAC OUTPUT, CONTINUOUS: ICD-10-PCS | Performed by: THORACIC SURGERY (CARDIOTHORACIC VASCULAR SURGERY)

## 2023-01-31 PROCEDURE — C1769 GUIDE WIRE: HCPCS | Performed by: THORACIC SURGERY (CARDIOTHORACIC VASCULAR SURGERY)

## 2023-01-31 PROCEDURE — 06BQ4ZZ EXCISION OF LEFT SAPHENOUS VEIN, PERCUTANEOUS ENDOSCOPIC APPROACH: ICD-10-PCS | Performed by: THORACIC SURGERY (CARDIOTHORACIC VASCULAR SURGERY)

## 2023-01-31 PROCEDURE — 87086 URINE CULTURE/COLONY COUNT: CPT

## 2023-01-31 PROCEDURE — 85347 COAGULATION TIME ACTIVATED: CPT

## 2023-01-31 PROCEDURE — 2580000003 HC RX 258: Performed by: ANESTHESIOLOGY

## 2023-01-31 PROCEDURE — 3700000001 HC ADD 15 MINUTES (ANESTHESIA): Performed by: THORACIC SURGERY (CARDIOTHORACIC VASCULAR SURGERY)

## 2023-01-31 PROCEDURE — 021309W BYPASS CORONARY ARTERY, FOUR OR MORE ARTERIES FROM AORTA WITH AUTOLOGOUS VENOUS TISSUE, OPEN APPROACH: ICD-10-PCS | Performed by: THORACIC SURGERY (CARDIOTHORACIC VASCULAR SURGERY)

## 2023-01-31 PROCEDURE — 94002 VENT MGMT INPAT INIT DAY: CPT

## 2023-01-31 PROCEDURE — 81001 URINALYSIS AUTO W/SCOPE: CPT

## 2023-01-31 PROCEDURE — 2580000003 HC RX 258: Performed by: PHYSICIAN ASSISTANT

## 2023-01-31 PROCEDURE — P9045 ALBUMIN (HUMAN), 5%, 250 ML: HCPCS

## 2023-01-31 PROCEDURE — 85610 PROTHROMBIN TIME: CPT

## 2023-01-31 PROCEDURE — 85384 FIBRINOGEN ACTIVITY: CPT

## 2023-01-31 PROCEDURE — 2709999900 HC NON-CHARGEABLE SUPPLY: Performed by: THORACIC SURGERY (CARDIOTHORACIC VASCULAR SURGERY)

## 2023-01-31 PROCEDURE — 71045 X-RAY EXAM CHEST 1 VIEW: CPT

## 2023-01-31 PROCEDURE — 37799 UNLISTED PX VASCULAR SURGERY: CPT

## 2023-01-31 PROCEDURE — 2500000003 HC RX 250 WO HCPCS

## 2023-01-31 PROCEDURE — C1729 CATH, DRAINAGE: HCPCS | Performed by: THORACIC SURGERY (CARDIOTHORACIC VASCULAR SURGERY)

## 2023-01-31 PROCEDURE — 2100000000 HC CCU R&B

## 2023-01-31 PROCEDURE — 6360000002 HC RX W HCPCS: Performed by: PHYSICIAN ASSISTANT

## 2023-01-31 PROCEDURE — 3600000008 HC SURGERY OHS BASE: Performed by: THORACIC SURGERY (CARDIOTHORACIC VASCULAR SURGERY)

## 2023-01-31 PROCEDURE — P9045 ALBUMIN (HUMAN), 5%, 250 ML: HCPCS | Performed by: THORACIC SURGERY (CARDIOTHORACIC VASCULAR SURGERY)

## 2023-01-31 PROCEDURE — 6360000002 HC RX W HCPCS: Performed by: NURSE ANESTHETIST, CERTIFIED REGISTERED

## 2023-01-31 PROCEDURE — 85520 HEPARIN ASSAY: CPT

## 2023-01-31 PROCEDURE — 82803 BLOOD GASES ANY COMBINATION: CPT

## 2023-01-31 PROCEDURE — C1713 ANCHOR/SCREW BN/BN,TIS/BN: HCPCS | Performed by: THORACIC SURGERY (CARDIOTHORACIC VASCULAR SURGERY)

## 2023-01-31 PROCEDURE — 2580000003 HC RX 258: Performed by: THORACIC SURGERY (CARDIOTHORACIC VASCULAR SURGERY)

## 2023-01-31 PROCEDURE — 6360000002 HC RX W HCPCS

## 2023-01-31 PROCEDURE — P9047 ALBUMIN (HUMAN), 25%, 50ML: HCPCS

## 2023-01-31 PROCEDURE — 83735 ASSAY OF MAGNESIUM: CPT

## 2023-01-31 PROCEDURE — B245ZZ4 ULTRASONOGRAPHY OF LEFT HEART, TRANSESOPHAGEAL: ICD-10-PCS | Performed by: ANESTHESIOLOGY

## 2023-01-31 PROCEDURE — 3700000000 HC ANESTHESIA ATTENDED CARE: Performed by: THORACIC SURGERY (CARDIOTHORACIC VASCULAR SURGERY)

## 2023-01-31 PROCEDURE — 80048 BASIC METABOLIC PNL TOTAL CA: CPT

## 2023-01-31 PROCEDURE — 84295 ASSAY OF SERUM SODIUM: CPT

## 2023-01-31 PROCEDURE — 2580000003 HC RX 258: Performed by: NURSE ANESTHETIST, CERTIFIED REGISTERED

## 2023-01-31 PROCEDURE — C1751 CATH, INF, PER/CENT/MIDLINE: HCPCS | Performed by: THORACIC SURGERY (CARDIOTHORACIC VASCULAR SURGERY)

## 2023-01-31 PROCEDURE — A4217 STERILE WATER/SALINE, 500 ML: HCPCS | Performed by: THORACIC SURGERY (CARDIOTHORACIC VASCULAR SURGERY)

## 2023-01-31 PROCEDURE — 85530 HEPARIN-PROTAMINE TOLERANCE: CPT

## 2023-01-31 PROCEDURE — 3600000018 HC SURGERY OHS ADDTL 15MIN: Performed by: THORACIC SURGERY (CARDIOTHORACIC VASCULAR SURGERY)

## 2023-01-31 PROCEDURE — 99223 1ST HOSP IP/OBS HIGH 75: CPT | Performed by: INTERNAL MEDICINE

## 2023-01-31 PROCEDURE — 2720000010 HC SURG SUPPLY STERILE: Performed by: THORACIC SURGERY (CARDIOTHORACIC VASCULAR SURGERY)

## 2023-01-31 PROCEDURE — 85027 COMPLETE CBC AUTOMATED: CPT

## 2023-01-31 PROCEDURE — 82962 GLUCOSE BLOOD TEST: CPT

## 2023-01-31 PROCEDURE — 93005 ELECTROCARDIOGRAM TRACING: CPT | Performed by: PHYSICIAN ASSISTANT

## 2023-01-31 RX ORDER — AMIODARONE HYDROCHLORIDE 200 MG/1
200 TABLET ORAL 2 TIMES DAILY
Status: DISCONTINUED | OUTPATIENT
Start: 2023-01-31 | End: 2023-02-06 | Stop reason: HOSPADM

## 2023-01-31 RX ORDER — INSULIN LISPRO 100 [IU]/ML
0-6 INJECTION, SOLUTION INTRAVENOUS; SUBCUTANEOUS NIGHTLY
Status: DISCONTINUED | OUTPATIENT
Start: 2023-02-01 | End: 2023-02-01

## 2023-01-31 RX ORDER — MORPHINE SULFATE 4 MG/ML
4 INJECTION, SOLUTION INTRAMUSCULAR; INTRAVENOUS
Status: DISCONTINUED | OUTPATIENT
Start: 2023-01-31 | End: 2023-02-01

## 2023-01-31 RX ORDER — MIDAZOLAM HYDROCHLORIDE 2 MG/2ML
2 INJECTION, SOLUTION INTRAMUSCULAR; INTRAVENOUS
Status: DISCONTINUED | OUTPATIENT
Start: 2023-01-31 | End: 2023-01-31 | Stop reason: HOSPADM

## 2023-01-31 RX ORDER — AMIODARONE HYDROCHLORIDE 200 MG/1
600 TABLET ORAL ONCE
Status: COMPLETED | OUTPATIENT
Start: 2023-01-31 | End: 2023-01-31

## 2023-01-31 RX ORDER — LIDOCAINE HYDROCHLORIDE 10 MG/ML
1 INJECTION, SOLUTION INFILTRATION; PERINEURAL
Status: DISCONTINUED | OUTPATIENT
Start: 2023-01-31 | End: 2023-01-31 | Stop reason: HOSPADM

## 2023-01-31 RX ORDER — SODIUM CHLORIDE 9 MG/ML
INJECTION, SOLUTION INTRAVENOUS PRN
Status: DISCONTINUED | OUTPATIENT
Start: 2023-01-31 | End: 2023-01-31 | Stop reason: HOSPADM

## 2023-01-31 RX ORDER — INSULIN LISPRO 100 [IU]/ML
0-12 INJECTION, SOLUTION INTRAVENOUS; SUBCUTANEOUS
Status: DISCONTINUED | OUTPATIENT
Start: 2023-02-01 | End: 2023-02-01

## 2023-01-31 RX ORDER — AMINOCAPROIC ACID 250 MG/ML
INJECTION, SOLUTION INTRAVENOUS PRN
Status: DISCONTINUED | OUTPATIENT
Start: 2023-01-31 | End: 2023-01-31 | Stop reason: SDUPTHER

## 2023-01-31 RX ORDER — SODIUM CHLORIDE 0.9 % (FLUSH) 0.9 %
5-40 SYRINGE (ML) INJECTION PRN
Status: DISCONTINUED | OUTPATIENT
Start: 2023-01-31 | End: 2023-01-31 | Stop reason: HOSPADM

## 2023-01-31 RX ORDER — HEPARIN SODIUM 1000 [USP'U]/ML
INJECTION, SOLUTION INTRAVENOUS; SUBCUTANEOUS PRN
Status: DISCONTINUED | OUTPATIENT
Start: 2023-01-31 | End: 2023-01-31 | Stop reason: HOSPADM

## 2023-01-31 RX ORDER — MIDAZOLAM HYDROCHLORIDE 2 MG/2ML
1 INJECTION, SOLUTION INTRAMUSCULAR; INTRAVENOUS
Status: DISCONTINUED | OUTPATIENT
Start: 2023-01-31 | End: 2023-02-01

## 2023-01-31 RX ORDER — ETOMIDATE 2 MG/ML
INJECTION INTRAVENOUS PRN
Status: DISCONTINUED | OUTPATIENT
Start: 2023-01-31 | End: 2023-01-31 | Stop reason: SDUPTHER

## 2023-01-31 RX ORDER — CHLORHEXIDINE GLUCONATE 0.12 MG/ML
10 RINSE ORAL 2 TIMES DAILY
Status: DISCONTINUED | OUTPATIENT
Start: 2023-01-31 | End: 2023-02-01

## 2023-01-31 RX ORDER — PROTAMINE SULFATE 10 MG/ML
INJECTION, SOLUTION INTRAVENOUS PRN
Status: DISCONTINUED | OUTPATIENT
Start: 2023-01-31 | End: 2023-01-31 | Stop reason: SDUPTHER

## 2023-01-31 RX ORDER — SODIUM CHLORIDE 0.9 % (FLUSH) 0.9 %
5-40 SYRINGE (ML) INJECTION EVERY 12 HOURS SCHEDULED
Status: DISCONTINUED | OUTPATIENT
Start: 2023-01-31 | End: 2023-02-01

## 2023-01-31 RX ORDER — SODIUM CHLORIDE, SODIUM LACTATE, POTASSIUM CHLORIDE, CALCIUM CHLORIDE 600; 310; 30; 20 MG/100ML; MG/100ML; MG/100ML; MG/100ML
INJECTION, SOLUTION INTRAVENOUS CONTINUOUS PRN
Status: DISCONTINUED | OUTPATIENT
Start: 2023-01-31 | End: 2023-01-31 | Stop reason: SDUPTHER

## 2023-01-31 RX ORDER — ACETAMINOPHEN 325 MG/1
650 TABLET ORAL EVERY 4 HOURS PRN
Status: DISCONTINUED | OUTPATIENT
Start: 2023-01-31 | End: 2023-02-01

## 2023-01-31 RX ORDER — POTASSIUM CHLORIDE 29.8 MG/ML
20 INJECTION INTRAVENOUS PRN
Status: DISCONTINUED | OUTPATIENT
Start: 2023-01-31 | End: 2023-02-01

## 2023-01-31 RX ORDER — DEXMEDETOMIDINE HYDROCHLORIDE 4 UG/ML
.1-1.5 INJECTION, SOLUTION INTRAVENOUS CONTINUOUS
Status: DISCONTINUED | OUTPATIENT
Start: 2023-01-31 | End: 2023-02-01

## 2023-01-31 RX ORDER — MORPHINE SULFATE 4 MG/ML
3 INJECTION, SOLUTION INTRAMUSCULAR; INTRAVENOUS
Status: DISCONTINUED | OUTPATIENT
Start: 2023-01-31 | End: 2023-02-01

## 2023-01-31 RX ORDER — ROCURONIUM BROMIDE 10 MG/ML
INJECTION, SOLUTION INTRAVENOUS PRN
Status: DISCONTINUED | OUTPATIENT
Start: 2023-01-31 | End: 2023-01-31 | Stop reason: SDUPTHER

## 2023-01-31 RX ORDER — SODIUM CHLORIDE 0.9 % (FLUSH) 0.9 %
5-40 SYRINGE (ML) INJECTION EVERY 12 HOURS SCHEDULED
Status: DISCONTINUED | OUTPATIENT
Start: 2023-01-31 | End: 2023-01-31 | Stop reason: HOSPADM

## 2023-01-31 RX ORDER — NOREPINEPHRINE BIT/0.9 % NACL 16MG/250ML
.01-.1 INFUSION BOTTLE (ML) INTRAVENOUS CONTINUOUS PRN
Status: DISCONTINUED | OUTPATIENT
Start: 2023-01-31 | End: 2023-02-01

## 2023-01-31 RX ORDER — FENTANYL CITRATE 50 UG/ML
100 INJECTION, SOLUTION INTRAMUSCULAR; INTRAVENOUS
Status: DISCONTINUED | OUTPATIENT
Start: 2023-01-31 | End: 2023-01-31 | Stop reason: HOSPADM

## 2023-01-31 RX ORDER — OXYCODONE HYDROCHLORIDE AND ACETAMINOPHEN 5; 325 MG/1; MG/1
1 TABLET ORAL EVERY 4 HOURS PRN
Status: DISCONTINUED | OUTPATIENT
Start: 2023-01-31 | End: 2023-02-01

## 2023-01-31 RX ORDER — MAGNESIUM SULFATE 1 G/100ML
1000 INJECTION INTRAVENOUS PRN
Status: DISCONTINUED | OUTPATIENT
Start: 2023-01-31 | End: 2023-02-01

## 2023-01-31 RX ORDER — KETOROLAC TROMETHAMINE 15 MG/ML
15 INJECTION, SOLUTION INTRAMUSCULAR; INTRAVENOUS EVERY 6 HOURS PRN
Status: DISCONTINUED | OUTPATIENT
Start: 2023-01-31 | End: 2023-02-01

## 2023-01-31 RX ORDER — DEXTROSE, SODIUM CHLORIDE, AND POTASSIUM CHLORIDE 5; .45; .15 G/100ML; G/100ML; G/100ML
INJECTION INTRAVENOUS CONTINUOUS
Status: DISCONTINUED | OUTPATIENT
Start: 2023-01-31 | End: 2023-02-01

## 2023-01-31 RX ORDER — NITROGLYCERIN 20 MG/100ML
INJECTION INTRAVENOUS PRN
Status: DISCONTINUED | OUTPATIENT
Start: 2023-01-31 | End: 2023-01-31 | Stop reason: SDUPTHER

## 2023-01-31 RX ORDER — NICARDIPINE HYDROCHLORIDE 0.1 MG/ML
INJECTION INTRAVENOUS PRN
Status: DISCONTINUED | OUTPATIENT
Start: 2023-01-31 | End: 2023-01-31 | Stop reason: SDUPTHER

## 2023-01-31 RX ORDER — ALBUMIN, HUMAN INJ 5% 5 %
SOLUTION INTRAVENOUS CONTINUOUS PRN
Status: COMPLETED | OUTPATIENT
Start: 2023-01-31 | End: 2023-01-31

## 2023-01-31 RX ORDER — GLYCOPYRROLATE 0.2 MG/ML
INJECTION INTRAMUSCULAR; INTRAVENOUS PRN
Status: DISCONTINUED | OUTPATIENT
Start: 2023-01-31 | End: 2023-01-31 | Stop reason: SDUPTHER

## 2023-01-31 RX ORDER — ONDANSETRON 2 MG/ML
4 INJECTION INTRAMUSCULAR; INTRAVENOUS EVERY 4 HOURS PRN
Status: DISCONTINUED | OUTPATIENT
Start: 2023-01-31 | End: 2023-02-01

## 2023-01-31 RX ORDER — SODIUM CHLORIDE 9 MG/ML
INJECTION, SOLUTION INTRAVENOUS PRN
Status: DISCONTINUED | OUTPATIENT
Start: 2023-01-31 | End: 2023-02-01

## 2023-01-31 RX ORDER — EPHEDRINE SULFATE/0.9% NACL/PF 50 MG/5 ML
SYRINGE (ML) INTRAVENOUS PRN
Status: DISCONTINUED | OUTPATIENT
Start: 2023-01-31 | End: 2023-01-31 | Stop reason: SDUPTHER

## 2023-01-31 RX ORDER — VECURONIUM BROMIDE 1 MG/ML
INJECTION, POWDER, LYOPHILIZED, FOR SOLUTION INTRAVENOUS PRN
Status: DISCONTINUED | OUTPATIENT
Start: 2023-01-31 | End: 2023-01-31 | Stop reason: SDUPTHER

## 2023-01-31 RX ORDER — FAMOTIDINE 20 MG/1
20 TABLET, FILM COATED ORAL 2 TIMES DAILY
Status: DISCONTINUED | OUTPATIENT
Start: 2023-01-31 | End: 2023-02-01

## 2023-01-31 RX ORDER — HEPARIN SODIUM 1000 [USP'U]/ML
INJECTION, SOLUTION INTRAVENOUS; SUBCUTANEOUS PRN
Status: DISCONTINUED | OUTPATIENT
Start: 2023-01-31 | End: 2023-01-31 | Stop reason: SDUPTHER

## 2023-01-31 RX ORDER — SODIUM CHLORIDE, SODIUM LACTATE, POTASSIUM CHLORIDE, CALCIUM CHLORIDE 600; 310; 30; 20 MG/100ML; MG/100ML; MG/100ML; MG/100ML
INJECTION, SOLUTION INTRAVENOUS CONTINUOUS
Status: DISCONTINUED | OUTPATIENT
Start: 2023-01-31 | End: 2023-01-31 | Stop reason: HOSPADM

## 2023-01-31 RX ORDER — SODIUM CHLORIDE 9 MG/ML
INJECTION, SOLUTION INTRAVENOUS CONTINUOUS PRN
Status: DISCONTINUED | OUTPATIENT
Start: 2023-01-31 | End: 2023-01-31 | Stop reason: SDUPTHER

## 2023-01-31 RX ORDER — NITROGLYCERIN 20 MG/100ML
0-100 INJECTION INTRAVENOUS CONTINUOUS PRN
Status: DISCONTINUED | OUTPATIENT
Start: 2023-01-31 | End: 2023-02-01

## 2023-01-31 RX ORDER — ASPIRIN 81 MG/1
81 TABLET ORAL DAILY
Status: DISCONTINUED | OUTPATIENT
Start: 2023-02-01 | End: 2023-02-06 | Stop reason: HOSPADM

## 2023-01-31 RX ORDER — FENTANYL CITRATE 50 UG/ML
25 INJECTION, SOLUTION INTRAMUSCULAR; INTRAVENOUS
Status: DISCONTINUED | OUTPATIENT
Start: 2023-01-31 | End: 2023-01-31 | Stop reason: HOSPADM

## 2023-01-31 RX ORDER — ATORVASTATIN CALCIUM 80 MG/1
80 TABLET, FILM COATED ORAL NIGHTLY
Status: DISCONTINUED | OUTPATIENT
Start: 2023-01-31 | End: 2023-02-06 | Stop reason: HOSPADM

## 2023-01-31 RX ORDER — LIDOCAINE HYDROCHLORIDE 20 MG/ML
INJECTION, SOLUTION EPIDURAL; INFILTRATION; INTRACAUDAL; PERINEURAL PRN
Status: DISCONTINUED | OUTPATIENT
Start: 2023-01-31 | End: 2023-01-31 | Stop reason: SDUPTHER

## 2023-01-31 RX ORDER — SODIUM CHLORIDE 0.9 % (FLUSH) 0.9 %
5-40 SYRINGE (ML) INJECTION PRN
Status: DISCONTINUED | OUTPATIENT
Start: 2023-01-31 | End: 2023-02-01

## 2023-01-31 RX ORDER — MIDAZOLAM HYDROCHLORIDE 1 MG/ML
INJECTION INTRAMUSCULAR; INTRAVENOUS PRN
Status: DISCONTINUED | OUTPATIENT
Start: 2023-01-31 | End: 2023-01-31 | Stop reason: SDUPTHER

## 2023-01-31 RX ORDER — FENTANYL CITRATE 0.05 MG/ML
INJECTION, SOLUTION INTRAMUSCULAR; INTRAVENOUS PRN
Status: DISCONTINUED | OUTPATIENT
Start: 2023-01-31 | End: 2023-01-31 | Stop reason: SDUPTHER

## 2023-01-31 RX ORDER — DEXMEDETOMIDINE HYDROCHLORIDE 4 UG/ML
INJECTION, SOLUTION INTRAVENOUS CONTINUOUS PRN
Status: DISCONTINUED | OUTPATIENT
Start: 2023-01-31 | End: 2023-01-31 | Stop reason: SDUPTHER

## 2023-01-31 RX ORDER — SODIUM CHLORIDE 9 MG/ML
INJECTION, SOLUTION INTRAVENOUS CONTINUOUS
Status: DISCONTINUED | OUTPATIENT
Start: 2023-01-31 | End: 2023-01-31 | Stop reason: HOSPADM

## 2023-01-31 RX ADMIN — MIDAZOLAM 2 MG: 1 INJECTION INTRAMUSCULAR; INTRAVENOUS at 07:34

## 2023-01-31 RX ADMIN — SODIUM CHLORIDE, PRESERVATIVE FREE 10 ML: 5 INJECTION INTRAVENOUS at 20:55

## 2023-01-31 RX ADMIN — PHENYLEPHRINE HYDROCHLORIDE 100 MCG: 10 INJECTION INTRAVENOUS at 10:02

## 2023-01-31 RX ADMIN — LIDOCAINE HYDROCHLORIDE 80 MG: 20 INJECTION, SOLUTION EPIDURAL; INFILTRATION; INTRACAUDAL; PERINEURAL at 07:47

## 2023-01-31 RX ADMIN — MIDAZOLAM 1 MG: 1 INJECTION INTRAMUSCULAR; INTRAVENOUS at 07:45

## 2023-01-31 RX ADMIN — DEXTROSE MONOHYDRATE 125 ML: 100 INJECTION, SOLUTION INTRAVENOUS at 14:23

## 2023-01-31 RX ADMIN — TUBERCULIN PURIFIED PROTEIN DERIVATIVE 5 UNITS: 5 INJECTION, SOLUTION INTRADERMAL at 21:18

## 2023-01-31 RX ADMIN — Medication 2000 MG: at 08:30

## 2023-01-31 RX ADMIN — ETOMIDATE 20 MG: 2 INJECTION, SOLUTION INTRAVENOUS at 07:47

## 2023-01-31 RX ADMIN — MIDAZOLAM 2 MG: 1 INJECTION INTRAMUSCULAR; INTRAVENOUS at 11:35

## 2023-01-31 RX ADMIN — MIDAZOLAM 2 MG: 1 INJECTION INTRAMUSCULAR; INTRAVENOUS at 07:29

## 2023-01-31 RX ADMIN — GLYCOPYRROLATE 0.1 MG: 0.2 INJECTION, SOLUTION INTRAMUSCULAR; INTRAVENOUS at 09:49

## 2023-01-31 RX ADMIN — Medication 0.02 MCG/KG/MIN: at 11:54

## 2023-01-31 RX ADMIN — NICARDIPINE HYDROCHLORIDE 0.2 MG: 0.1 INJECTION INTRAVENOUS at 12:14

## 2023-01-31 RX ADMIN — PHENYLEPHRINE HYDROCHLORIDE 50 MCG: 10 INJECTION INTRAVENOUS at 09:20

## 2023-01-31 RX ADMIN — NITROGLYCERIN 10 MCG/MIN: 20 INJECTION INTRAVENOUS at 08:16

## 2023-01-31 RX ADMIN — HEPARIN SODIUM 25000 UNITS: 1000 INJECTION, SOLUTION INTRAVENOUS; SUBCUTANEOUS at 10:01

## 2023-01-31 RX ADMIN — SODIUM CHLORIDE, POTASSIUM CHLORIDE, SODIUM LACTATE AND CALCIUM CHLORIDE: 600; 310; 30; 20 INJECTION, SOLUTION INTRAVENOUS at 06:49

## 2023-01-31 RX ADMIN — FENTANYL CITRATE 250 MCG: 0.05 INJECTION, SOLUTION INTRAMUSCULAR; INTRAVENOUS at 07:47

## 2023-01-31 RX ADMIN — Medication 2000 MG: at 12:09

## 2023-01-31 RX ADMIN — DEXMEDETOMIDINE HYDROCHLORIDE 0.5 MCG/KG/HR: 4 INJECTION, SOLUTION INTRAVENOUS at 11:37

## 2023-01-31 RX ADMIN — SODIUM CHLORIDE 3.44 UNITS/HR: 9 INJECTION, SOLUTION INTRAVENOUS at 22:15

## 2023-01-31 RX ADMIN — GLYCOPYRROLATE 0.1 MG: 0.2 INJECTION, SOLUTION INTRAMUSCULAR; INTRAVENOUS at 09:47

## 2023-01-31 RX ADMIN — VECURONIUM BROMIDE 2 MG: 1 INJECTION, POWDER, LYOPHILIZED, FOR SOLUTION INTRAVENOUS at 10:56

## 2023-01-31 RX ADMIN — AMINOCAPROIC ACID 10000 MG: 250 INJECTION, SOLUTION INTRAVENOUS at 08:16

## 2023-01-31 RX ADMIN — FENTANYL CITRATE 250 MCG: 0.05 INJECTION, SOLUTION INTRAMUSCULAR; INTRAVENOUS at 09:27

## 2023-01-31 RX ADMIN — ATORVASTATIN CALCIUM 80 MG: 80 TABLET, FILM COATED ORAL at 21:15

## 2023-01-31 RX ADMIN — ROCURONIUM BROMIDE 50 MG: 50 INJECTION, SOLUTION INTRAVENOUS at 11:35

## 2023-01-31 RX ADMIN — AMIODARONE HYDROCHLORIDE 600 MG: 200 TABLET ORAL at 06:49

## 2023-01-31 RX ADMIN — FENTANYL CITRATE 250 MCG: 0.05 INJECTION, SOLUTION INTRAMUSCULAR; INTRAVENOUS at 07:51

## 2023-01-31 RX ADMIN — Medication 5 MG: at 09:19

## 2023-01-31 RX ADMIN — DEXTROSE MONOHYDRATE, SODIUM CHLORIDE, AND POTASSIUM CHLORIDE: 50; 4.5; 1.49 INJECTION, SOLUTION INTRAVENOUS at 14:25

## 2023-01-31 RX ADMIN — SODIUM CHLORIDE: 9 INJECTION, SOLUTION INTRAVENOUS at 08:20

## 2023-01-31 RX ADMIN — PHENYLEPHRINE HYDROCHLORIDE 100 MCG: 10 INJECTION INTRAVENOUS at 10:23

## 2023-01-31 RX ADMIN — SODIUM CHLORIDE, SODIUM LACTATE, POTASSIUM CHLORIDE, AND CALCIUM CHLORIDE: 600; 310; 30; 20 INJECTION, SOLUTION INTRAVENOUS at 07:29

## 2023-01-31 RX ADMIN — NICARDIPINE HYDROCHLORIDE 0.2 MG: 0.1 INJECTION INTRAVENOUS at 09:12

## 2023-01-31 RX ADMIN — FAMOTIDINE 20 MG: 10 INJECTION, SOLUTION INTRAVENOUS at 20:58

## 2023-01-31 RX ADMIN — VECURONIUM BROMIDE 3 MG: 1 INJECTION, POWDER, LYOPHILIZED, FOR SOLUTION INTRAVENOUS at 08:31

## 2023-01-31 RX ADMIN — VECURONIUM BROMIDE 3 MG: 1 INJECTION, POWDER, LYOPHILIZED, FOR SOLUTION INTRAVENOUS at 09:16

## 2023-01-31 RX ADMIN — Medication 10 MG: at 09:39

## 2023-01-31 RX ADMIN — Medication 5 MG: at 08:37

## 2023-01-31 RX ADMIN — NITROGLYCERIN 50 MCG: 20 INJECTION INTRAVENOUS at 07:57

## 2023-01-31 RX ADMIN — CEFAZOLIN SODIUM 2000 MG: 100 INJECTION, POWDER, LYOPHILIZED, FOR SOLUTION INTRAVENOUS at 20:54

## 2023-01-31 RX ADMIN — AMINOCAPROIC ACID 1 G/HR: 250 INJECTION, SOLUTION INTRAVENOUS at 08:47

## 2023-01-31 RX ADMIN — PROTAMINE SULFATE 250 MG: 10 INJECTION, SOLUTION INTRAVENOUS at 12:03

## 2023-01-31 RX ADMIN — VECURONIUM BROMIDE 2 MG: 1 INJECTION, POWDER, LYOPHILIZED, FOR SOLUTION INTRAVENOUS at 10:13

## 2023-01-31 RX ADMIN — ROCURONIUM BROMIDE 50 MG: 50 INJECTION, SOLUTION INTRAVENOUS at 07:47

## 2023-01-31 RX ADMIN — FENTANYL CITRATE 100 MCG: 0.05 INJECTION, SOLUTION INTRAMUSCULAR; INTRAVENOUS at 12:08

## 2023-01-31 RX ADMIN — PHENYLEPHRINE HYDROCHLORIDE 50 MCG: 10 INJECTION INTRAVENOUS at 09:54

## 2023-01-31 RX ADMIN — FENTANYL CITRATE 150 MCG: 0.05 INJECTION, SOLUTION INTRAMUSCULAR; INTRAVENOUS at 10:09

## 2023-01-31 RX ADMIN — KETOROLAC TROMETHAMINE 15 MG: 15 INJECTION, SOLUTION INTRAMUSCULAR; INTRAVENOUS at 17:12

## 2023-01-31 RX ADMIN — ACETAMINOPHEN 650 MG: 325 TABLET ORAL at 21:15

## 2023-01-31 RX ADMIN — NICARDIPINE HYDROCHLORIDE 0.1 MG: 0.1 INJECTION INTRAVENOUS at 09:08

## 2023-01-31 ASSESSMENT — PAIN DESCRIPTION - LOCATION
LOCATION: BACK
LOCATION: BACK

## 2023-01-31 ASSESSMENT — PAIN DESCRIPTION - PAIN TYPE: TYPE: CHRONIC PAIN

## 2023-01-31 ASSESSMENT — PAIN SCALES - GENERAL
PAINLEVEL_OUTOF10: 10
PAINLEVEL_OUTOF10: 0

## 2023-01-31 ASSESSMENT — PAIN DESCRIPTION - DESCRIPTORS
DESCRIPTORS: ACHING
DESCRIPTORS: ACHING

## 2023-01-31 ASSESSMENT — PAIN DESCRIPTION - FREQUENCY: FREQUENCY: INTERMITTENT

## 2023-01-31 ASSESSMENT — PAIN DESCRIPTION - ONSET: ONSET: ON-GOING

## 2023-01-31 ASSESSMENT — PAIN DESCRIPTION - ORIENTATION
ORIENTATION: LOWER;POSTERIOR
ORIENTATION: UPPER;LOWER

## 2023-01-31 ASSESSMENT — PULMONARY FUNCTION TESTS: PIF_VALUE: 21

## 2023-01-31 ASSESSMENT — PAIN - FUNCTIONAL ASSESSMENT: PAIN_FUNCTIONAL_ASSESSMENT: 0-10

## 2023-01-31 NOTE — PROGRESS NOTES
Patient out from operating room and placed on the ventilator on documented settings. Patient is orally intubated with a # 7.0 ET Tube secured at the 22 cm ellie at the lip. Breath sounds are diminished. Trachea is midline. negative for subcutaneous air, chest excursion is symmetric.  negative for pitting edema. Patient is also negative for cyanosis. Patient has a Left Radial arterial line. Patient has 2 Chest tubes. All alarms are set and audible. Resuscitation bag is at the head of the bed. Ventilator Settings  Mode FIO2 Rate Tidal Volume Pressure PEEP I:E Ratio                           Peak airway pressure:     Minute ventilation:       ABG: No results for input(s): PH, PCO2, PO2, HCO3 in the last 72 hours.       Carol Kendrick RCP

## 2023-01-31 NOTE — ANESTHESIA PROCEDURE NOTES
Arterial Line:    An arterial line was placed using ultrasound guidance, in the OR for the following indication(s): continuous blood pressure monitoring and blood sampling needed. A 20 gauge (size) (length), Arrow (type) catheter was placed, Seldinger technique used, into the left radial artery, secured by Tegaderm and tape. Anesthesia type: Local  Local infiltration: Injection    Events:  patient tolerated procedure well with no complications and EBL < 5mL. Additional notes:  Left arm prepped with ChloraPrep, 0.8ml of 1% lidocaine infiltrated at skin, ultrasound guided Seldinger technique, good blood return, good waveform. Potential access sites were examined with ultrasound and acceptable patent access site selected as noted above. Needle path and artery access visualized in real time using ultrasound, an image of wire in vessel recorded for permanent record.     1/31/2023 7:36 AM1/31/2023 7:38 AM  Resident/CRNA: FERNANDO Hernandez CRNA  Performed: Resident/CRNA   Preanesthetic Checklist  Completed: patient identified, IV checked, site marked, risks and benefits discussed, surgical/procedural consents, equipment checked, pre-op evaluation, timeout performed, anesthesia consent given, oxygen available, monitors applied/VS acknowledged, fire risk safety assessment completed and verbalized and blood product R/B/A discussed and consented

## 2023-01-31 NOTE — PROGRESS NOTES
TIMEOUT performed prior to extubation on Gia Irene with RT, primary RN, and charge RN present on 1/31/2023 at 6:47 PM.     Per anesthesia team on admission, patient's intubation was no acute fracture or dislocation    The patient is hemodynamically stable and can demonstrate the following on a consistent basis:  follow commands , elevate head off the pillow, nods appropriately to questions. Dr. Zoe Bass notified of weaning parameters and order to extubate obtained. Patient extubated by (RT name) Chato Maya to (Type of O2 Device) AirVo at (Amount of of O2) 75/57 without complication.

## 2023-01-31 NOTE — OP NOTE
[facility]  OPERATIVE REPORT    Name:  Luma Holt  MR#:  [id number]  :  1950  ACCOUNT #:  [de-identified]  DATE OF SERVICE:  2023    PREOPERATIVE DIAGNOSIS:  Severe multivessel atherosclerotic coronary artery disease. POSTOPERATIVE DIAGNOSIS:  Severe multivessel atherosclerotic coronary artery disease. PROCEDURES PERFORMED:  1. Endoscopic vein harvest from left leg. 2.  Coronary artery bypass grafting x5 with grafts consisting of,       a. Left internal mammary artery to left anterior descending.       b.  Reverse saphenous vein graft to the diagonal.       c.  Reverse saphenous vein graft to the ramus. d.  Reverse saphenous vein graft to obtuse marginal.       e.  Reverse saphenous vein graft to the distal right coronary. SURGEON:  Zan Guerra MD    ASSISTANT:      ANESTHESIA:  General endotracheal with YAQUELIN. COMPLICATIONS:  .    SPECIMENS REMOVED:  .    IMPLANTS:  .    ESTIMATED BLOOD LOSS:  Minimal.    OPERATIVE FINDINGS:  Saphenous vein 2-5 mm. LIMA 2.5 mm. Aorta is soft, no palpable plaque. The patient is noted to have severe diffuse calcific coronary artery disease. LAD is 1.4 mm with severe distal disease. Diagonal is 1.5 mm with severe distal disease. Ramus 1.5 mm with severe distal disease. OM was chronically occluded 1.2 mm with severe distal disease. The distal right coronary is 1.3 mm with severe distal disease. INDICATIONS:  The patient is a very pleasant 79-year-old lady who is being worked up for back surgery. She was noted to have an abnormal EKG on her preop testing, was referred to Cardiology for workup. She had an abnormal stress test.  This lead to have a left heart cath, which showed severe multivessel disease with relatively preserved LV function. DESCRIPTION OF PROCEDURE:  After informed consent was obtained for the above-mentioned procedure, the patient was then taken to the operating room.   Appropriate monitoring lines GROUP THERAPY PROGRESS NOTE    Celiagregor Tori participated in Reading. Group time: 1 hour    Personal goal for participation:Discuss goals for the day, and also review rules of the unit. Goal orientation: community    Group therapy participation: active    Therapeutic interventions reviewed and discussed:  Pts discussed their goals for the day, asked questions regarding rules of unit which writer addressed. Also , pts discussed plan for when they are discharged. Impression of participation:  The pt shared she is ready to go home today, and she also plans on forming better communication between her and her mother. were placed by the Anesthesia Department. After administration of general endotracheal anesthesia, the patient was prepped and draped in the usual fashion with Betadine scrub and paint and Ioban cardiovascular drapes. The chest was then entered through a standard mediastinotomy incision while simultaneous harvesting of peripheral conduit was undertaken. After harvesting the conduit, it was inspected and noted to be adequate. The incisions were subsequently closed in a 2-layer fashion of 3-0 and 4-0 Vicryl. The left internal mammary harvest was then undertaken in pedicle fashion and was injected with Papaverine solution. After administration of heparin, the distal pedicle was incised and noted to bleed briskly. A thoracostomy tube was then placed. This was brought out through a separate stab incision inferiorly and affixed to the skin. The pericardium was then opened and tacked up into a pericardial well, revealing the heart. Pursestrings were then placed into the aorta, the right atrial appendage, and the right atrium. After adequate ACT was obtained, the patient was then cannulated through these pursestrings, at which point cardiopulmonary bypass was started. Target vessels were identified and marked. A crossclamp was then applied. Antegrade and retrograde cardioplegia was administered initially and then given intermittently throughout the case. A latticed heart support was placed to assist with the distal anastomoses, which were performed by using 7-0 Prolene for vein to artery anastomosis and 8-0 Prolene for artery-to-artery anastomosis. Proximal anastomoses to the aorta were also placed using 6-0 Prolene. At the conclusion of the final proximal anastomosis, the aorta and right ventricle were flushed of debris and the anastomosis was completed. The crossclamp was then removed. The heart then underwent meticulous de-airing. The patient was warmed and weaned from the cardiopulmonary bypass. The distal anastomoses were visualized and noted to be hemostatic. All grafts were dopplered and noted to have an excellent flow. The venous cannula was then removed. Ventricular and atrial pacing wires were then placed upon the heart, brought out through stab wounds inferiorly and affixed to the skin. A single straight mediastinal tube was placed, brought out through a separate stab incision inferiorly, and also affixed to the skin. After meticulous hemostasis was made, the sternum was reapproximated with heavy gauge stainless steel wire. The midline fascia was subsequently closed separately. Vicryl was then used in a running fashion for subcutaneous tissue and skin. Dressings were then applied to all wounds. The patient was then transported with monitors to the intensive care unit intubated and ventilated. ADDENDUM:  After induction of general anesthesia, prepping and draping, four segments of greater saphenous vein were then taken from her left leg using the [employer] system. These incisions were closed in two-layered fashion with 3-0 and 4-0 Vicryl. COMPLICATIONS:  None. All instruments and sponge counts were correct at the end of the case.         MD PHIL [email or web address]  D:  01/31/2023 13:11  T:  01/31/2023 18:37  JOB #:  [id number]

## 2023-01-31 NOTE — ANESTHESIA PROCEDURE NOTES
Central Venous Line:    A central venous line was placed using ultrasound guidance, in the OR for the following indication(s): central venous access. 1/31/2023 8:00 AM1/31/2023 8:10 AM    Sterility preparation included the following: hand hygiene performed prior to procedure, maximum sterile barriers used and sterile technique used to drape from head to toe. The patient was placed in Trendelenburg position. The right internal jugular vein was prepped. The site was prepped with Chloraprep. A 9 Fr (size), 15 (length), introducer double lumen was placed. During the procedure, the following specific steps were taken: target vein identified, needle advanced into vein and blood aspirated and guidewire advanced into vein. Intravenous verification was obtained by ultrasound, venous blood return and manometry. Post insertion care included: all ports aspirated, all ports flushed easily, guidewire removed intact, line sutured in place and dressing applied. During the procedure the patient experienced: patient tolerated procedure well with no complications and EBL < 5mL. Outcomes: uncomplicated  Real-time US image taken/store: yes  Anesthesia type: general.. No    Additional notes:  Potential access site(s) were examined with ultrasonography under sterile conditions (sterile skin prep, drapes and probe cover) and the most acceptable patent access site was selected (right internal jugular vein). The needle path and vein access were visualized using real time ultrasonography, and an image of the guidewire in vessel was recorded for permanent record. A 9 Khmer double lumen introducer catheter was inserted without difficulty over the guidewire, the guidewire was removed intact, and blood was easily aspirated from all ports. The patient tolerated the procedure well with no evidence of immediate complications.    Staffing  Performed: Anesthesiologist   Anesthesiologist: Diana Kelly MD  Preanesthetic Checklist  Completed: patient identified, IV checked, site marked, risks and benefits discussed, surgical/procedural consents, equipment checked, pre-op evaluation, timeout performed, anesthesia consent given, oxygen available, monitors applied/VS acknowledged, fire risk safety assessment completed and verbalized and blood product R/B/A discussed and consented

## 2023-01-31 NOTE — ANESTHESIA PROCEDURE NOTES
Procedure Performed: YAQUELIN       Start Time:  1/31/2023 8:15 AM       End Time:   1/31/2023 8:30 AM    Preanesthesia Checklist:  Patient identified, IV assessed, risks and benefits discussed, monitors and equipment assessed and anesthesia consent obtained. General Procedure Information  Diagnostic Indications for Echo:  hemodynamic monitoring    Echocardiographic and Doppler Measurements    Ventricles    Left Ventricle:  Cavity size dilated. Hypertrophy not present. Thrombus not present. Global Function moderately impaired. Ejection Fraction 35%.

## 2023-01-31 NOTE — PROGRESS NOTES
Spiritual Care visit. Initial Visit, Pre Surgery Consult. Visit and prayer before patient goes to surgery.     Visit by Erasto Rico M.Ed., Th.B. ,Staff

## 2023-01-31 NOTE — PERIOP NOTE
Pt placed on 3 L O2 per NC per protocol. Allevyn dsg placed on chart per protocol. Warming blanket applied. Hair clipped chin to toe per protocol. Wiped with CHG wipes. #18g IV placed in R hand. Amio, beta blocker, & 81mg ASA this AM. Pt watched pre-op teaching video. All necessary studies in pt's chart, reviewed. On heart monitor. Call light given. Family at bedside.

## 2023-01-31 NOTE — ANESTHESIA POSTPROCEDURE EVALUATION
Department of Anesthesiology  Postprocedure Note    Patient: Armando Adam  MRN: [id number]  YOB: 1950  Date of evaluation: 1/31/2023      Procedure Summary     Date: 01/31/23 Room / Location: [facility] OR 04 CARDIAC / [facility] OR    Anesthesia Start: 0720 Anesthesia Stop: 6623    Procedure: CABG CORONARY ARTERY BYPASS,(CABGX5), LIMA, ENDOSCOPIC GREATER SAPHENOUS VEIN HARVEST LEFT LEG, TRANSESOPHOGEAL ECHOCARDIOGRAM (Chest) Diagnosis:       Atherosclerotic heart disease of native coronary artery with unstable angina pectoris (HCC)      Chest pain      Heart failure, systolic (HCC)      Abnormal stress test      (Atherosclerotic heart disease of native coronary artery with unstable angina pectoris (HCC) [I25.110])      (Chest pain [R07.9])      (Heart failure, systolic (HCC) [A55.06])      (Abnormal stress test [R94.39])    Providers: Eusebio Everett MD Responsible Provider: Juliane Babin MD    Anesthesia Type: general ASA Status: 3          Anesthesia Type: No value filed.     Scottie Phase I: Scottie Score: 10    Scottie Phase II:        Anesthesia Post Evaluation    Patient location during evaluation: ICU  Patient participation: complete - patient cannot participate  Level of consciousness: sedated and ventilated  Pain score: 0  Airway patency: patent  Nausea & Vomiting: no nausea  Complications: no  Cardiovascular status: hemodynamically stable and vasoactive/inotropes  Respiratory status: acceptable, ventilator and intubated  Hydration status: stable  Multimodal analgesia pain management approach

## 2023-01-31 NOTE — ANESTHESIA PROCEDURE NOTES
Airway  Date/Time: 1/31/2023 7:53 AM  Urgency: elective    Airway not difficult    General Information and Staff    Patient location during procedure: OR  Resident/CRNA: FERNANDO Robert CRNA  Performed: resident/CRNA     Indications and Patient Condition  Indications for airway management: anesthesia  Spontaneous Ventilation: absent  Sedation level: deep  Preoxygenated: yes  Patient position: sniffing  MILS not maintained throughout  Mask difficulty assessment: vent by bag mask    Final Airway Details  Final airway type: endotracheal airway      Successful airway: ETT  Cuffed: yes   Successful intubation technique: direct laryngoscopy  Facilitating devices/methods: cricoid pressure  Endotracheal tube insertion site: oral  Blade: Nunes  Blade size: #3  Cormack-Lehane Classification: grade I - full view of glottis  Placement verified by: chest auscultation and capnometry   Inital cuff pressure (cm H2O): 25  Measured from: lips  ETT to lips (cm): 22  Number of attempts at approach: 1  Number of other approaches attempted: 0    no

## 2023-01-31 NOTE — PERIOP NOTE
TRANSFER - OUT REPORT:    Verbal report given to Nel Wong RN on [employer]  being transferred to CVICU for routine progression of patient care       Report consisted of patient's Situation, Background, Assessment and   Recommendations(SBAR). Information from the following report(s) Surgery Report was reviewed with the receiving nurse. Marlow Assessment: No data recorded  Lines:   Peripheral IV 01/31/23 Left Hand (Active)       Arterial Line 01/31/23 Radial (Active)        Opportunity for questions and clarification was provided.       Patient transported with:  Monitor, O2 @ 15 lpm, and Registered Nurse  CRNA  MDA

## 2023-01-31 NOTE — BRIEF OP NOTE
Brief Postoperative Note      Patient: Carlos Sierra  YOB: 1950  MRN: [id number]    Date of Procedure: 1/31/2023    Pre-Op Diagnosis: Atherosclerotic heart disease of native coronary artery with unstable angina pectoris ([address] [I25.110]  Chest pain [R07.9]  Heart failure, systolic (HCC) [W00.73]  Abnormal stress test [R94.39]    Post-Op Diagnosis: Same       Procedure(s):  CABG CORONARY ARTERY BYPASS,(CABGX5), LIMA, ENDOSCOPIC GREATER SAPHENOUS VEIN HARVEST LEFT LEG, TRANSESOPHOGEAL ECHOCARDIOGRAM    Surgeon(s):  Jordan Grady MD    Assistant:  * No surgical staff found *    Anesthesia: General    Estimated Blood Loss (mL): Minimal    Complications: None    Specimens:   * No specimens in log *    Implants:  * No implants in log *      Drains:   Chest Tube Left Pleural 1 (Active)   Chest Tube Airleak No 01/31/23 1300   Status Continuous Suction 01/31/23 1300   Suction -20 cm H2O 01/31/23 1300   Y Connector Used Yes 01/31/23 1300   Drainage Description Sanguinous 01/31/23 1300   Dressing Status Clean, dry & intact 01/31/23 1300   Chest Tube Dressing Petroleum Jelly 01/31/23 1300   Site Assessment Clean, dry & intact 01/31/23 1300   Surrounding Skin Clean, dry & intact 01/31/23 1300   Output (ml) 0 ml 01/31/23 1300       Chest Tube Anterior Mediastinal 2 (Active)   Chest Tube Airleak No 01/31/23 1300   Status Continuous Suction 01/31/23 1300   Suction -20 cm H2O 01/31/23 1300   Y Connector Used Yes 01/31/23 1300   Drainage Description Sanguinous 01/31/23 1300   Dressing Status Clean, dry & intact 01/31/23 1300   Chest Tube Dressing Petroleum Jelly 01/31/23 1300   Site Assessment Clean, dry & intact 01/31/23 1300   Surrounding Skin Clean, dry & intact 01/31/23 1300       NG/OG/NJ/NE Tube Orogastric 18 fr (Active)       Urinary Catheter 01/31/23 Menezes-Temperature (Active)   Catheter Indications Need for fluid volume management of the critically ill patient in a critical care setting;Perioperative use for selected surgical procedures 01/31/23 1300   Site Assessment No urethral drainage 01/31/23 1300   Urine Color Yellow 01/31/23 1300   Urine Appearance Clear 01/31/23 1300   Collection Container Standard 01/31/23 1300   Securement Method Tape 01/31/23 1300   Catheter Care Completed Yes 01/31/23 1300   Catheter Best Practices  Drainage tube clipped to bed;Catheter secured to thigh; Tamper seal intact; Bag below bladder;Lack of dependent loop in tubing;Bag not on floor;Drainage bag less than half full 01/31/23 1300   Status Draining;Patent 01/31/23 1300   Output (mL) 10 mL 01/31/23 1300       Findings: cad    Electronically signed by Alicia Muhammad MD on 1/31/2023 at 1:04 PM

## 2023-01-31 NOTE — PROGRESS NOTES
Respiratory Mechanics completed and are as follows:R 22, , RSBI 66.06, NIF -25     Patient extubated to a AIRVO 40L40% NC. Patient IS able to communicate and IS negative for stridor. Breath sounds are diminished. No complications with extubation.      Dennys Gillette RCP

## 2023-01-31 NOTE — CARE COORDINATION
Chart screened by  for discharge planning. No needs identified at this time. Please consult  if any new issues arise.        01/31/23 1300   Condition of Participation: Discharge Planning   The Plan for Transition of Care is related to the following treatment goals: Pending clinical progress

## 2023-01-31 NOTE — CONSULTS
Cardiovascular ICU Consult Note: 1/31/2023  Nicol Palomares                                                     Admission Date: 1/31/2023     The patient's chart is reviewed and the patient is discussed with the staff. Subjective:     Patient is seen at the request of Aline Urrutia MD  for respiratory management status post cardiac surgery. Patient has a history of  CAD, multiple vessel, chronic back pain due to spondylosis and radiculopathy, PTSD with anxiety with medical procedures, never smoking. Currently is sedated in CV-ICU and orally intubated receiving  mechanical ventilation. We have been asked to see in the CV-ICU for mechanical ventilation management and weaning after CABG  x 5. Current Outpatient Medications   Medication Instructions    acetaminophen (TYLENOL) 500 mg, Oral, 2 TIMES DAILY    ALPRAZolam (XANAX) 1 MG tablet Take 1 tablet by mouth 1 hour prior to MRI    amiodarone (CORDARONE) 600 mg, Oral, ONCE, Take 3 tablets after 4pm the evening before surgery.     diphenhydrAMINE (BENADRYL) 25 mg, Oral    DULoxetine (CYMBALTA) 30 mg, Oral, 2 TIMES DAILY    meloxicam (MOBIC) 7.5 mg, Oral, 2 times daily    metoprolol succinate (TOPROL XL) 25 mg, Oral, DAILY    ondansetron (ZOFRAN) 4 mg, IntraVENous, EVERY 6 HOURS PRN    raNITIdine (ZANTAC) 150 mg, Oral, DAILY    tiZANidine (ZANAFLEX) 2 MG tablet EVERY 8 HOURS PRN, TAKE 1 TABLET BY MOUTH THREE TIMES A DAY      Review of Systems: unable to determine due to intubated status  Past Medical History:   Diagnosis Date    Arthritis     osteo    CAD, multiple vessel 01/25/2023    Chronic pain     r/t arthritis in hip and knees    COVID-19 01/2020    never formally tested, reports symptoms of severe headache, fevers, loss of taste and smell    DDD (degenerative disc disease)     \"back\", chronic pain in lower back    GERD (gastroesophageal reflux disease)     controlled w/ OTC med    Psychiatric disorder     PTSD, severe anxiety with medical procedures    Seizure [facility])     patient denies seizure, states was evaluated by Neurologist and was told she only had syncopal episode    Spondylolisthesis of lumbar region     severe spinal canal stenosis L4-5    Thyroid disease     history of hypo in past. No current issues, no meds     Past Surgical History:   Procedure Laterality Date    JOINT REPLACEMENT Left 2018    SHOULDER SURGERY Left 2019    TOTAL HIP ARTHROPLASTY Right 04/2014     Social History     Socioeconomic History    Marital status:      Spouse name: Not on file    Number of children: Not on file    Years of education: Not on file    Highest education level: Not on file   Occupational History    Not on file   Tobacco Use    Smoking status: Never    Smokeless tobacco: Never   Vaping Use    Vaping Use: Never used   Substance and Sexual Activity    Alcohol use: Yes     Comment: rarely only on social occasions    Drug use: No    Sexual activity: Not on file   Other Topics Concern    Not on file   Social History Narrative    Not on file     Social Determinants of Health     Financial Resource Strain: Not on file   Food Insecurity: Not on file   Transportation Needs: Not on file   Physical Activity: Not on file   Stress: Not on file   Social Connections: Not on file   Intimate Partner Violence: Not on file   Housing Stability: Not on file     Family History   Problem Relation Age of Onset    Diabetes Mother     Osteoarthritis Mother     Osteoarthritis Father     Lung Disease Father     Cancer Mother      Allergies   Allergen Reactions    Contrast [Barium-Containing Compounds] Other (See Comments)     \"Blood Poisoning\"  States, Can take if premedicated with benadryl and prednisone    Oxycodone Nausea And Vomiting and Other (See Comments)     \" incoherent \"     Sulfa Antibiotics Rash     Objective:   Blood pressure 116/83, pulse (!) 49, temperature 97.3 °F (36.3 °C), temperature source Bladder, resp.  rate 17, height 5' 4\" (1.626 m), weight 196 lb 3.2 oz (89 kg), SpO2 100 %. Intake/Output Summary (Last 24 hours) at 1/31/2023 1338  Last data filed at 1/31/2023 1300  Gross per 24 hour   Intake 2200 ml   Output 660 ml   Net 1540 ml     Physical Exam:          Constitutional:  Sedated, orally intubated and mechanically ventilated. EENMT:  Sclera clear, pupils equal, oral mucosa moist and orally intubated  Respiratory: CTAB  Cardiovascular:  RRR with no M,G,R;  Gastrointestinal:  soft; decreased bowel sounds present  Musculoskeletal:  warm with no cyanosis, no lower extremity edema. [place] site left leg with ace wrap. Sedated with no movements. SKIN:  no jaundice or ecchymosis   Neurologic:  sedated but no gross neuro deficits  Psychiatric:  sedated and unable to assess at this time    CXR:          LINES:  ETT, kaplan, swan ba, arterial line, chest tubes times 2 in epigastric area without air leak. DRIPS:   Dose (mcg/kg/hr) Dexmedetomidine: 0.5 mcg/kg/hr  Dose (mcg/min) Epinephrine: 0 mcg/min  Dose (mcg/kg/min) Phenylephrine : *20 mcg/min  Dose (mg/hr) Nicardipine: *0.2 mg  Dose (units/hr) Insulin : 3 Units/hr     CI:       Ventilator Settings:  Ideal body weight: 54.7 kg (120 lb 9.5 oz)  Adjusted ideal body weight: 68.4 kg (150 lb 13.4 oz)  Mode FIO2 Rate Tidal Volume Pressure   AC/PRVC    100 %  16 bmp         8     Peak airway pressure:     Minute ventilation:    Recent Labs     01/31/23  1146 01/31/23  1224 01/31/23  1314   PHAPOC 7.32* 7.35 7.36   [id number] 43.2 37.9 39.6   [id number] 234* 207* 383*   RUV7UWF 22.4 20.8* 22.3     Recent Labs     01/31/23  1316   WBC 17.8*   HGB 9.4*   HCT 29.0*        No results for input(s): NA, K, CL, CO2, GLU, BUN, CREATININE, MG, PHOS, CA, TROPHS, ALB, NTPROBNP in the last 72 hours. Invalid input(s): TBIL, GPT, SGOT  No results for input(s): PROCAL, LACTATE in the last 72 hours. No results found for this or any previous visit.     Assessment and Plan :  (Medical Decision Making)   Impression: 67 y.o. y/o female s/p CV surgery for CAD, multiple vessel    Encounter for weaning from Ventilator:  Post op ABG and CXR review. Continue with weaning per protocol. Hypoxemia:   Once weaned from ventilator will work on IS, mobility and weaning O2. Bronchodilators per protocol. Atelectasis:   IS, mobility after extubated. Anxiety:  Will try precedex but if bradycardia limits this may need atypical antipsychotic or benzo. S/P Cardiovascular surgery:  Monitor chest tube output, removal per surgery. More than 50% of the time documented was spent in face-to-face contact with the patient and in the care of the patient on the floor/unit where the patient is located. Thank you for this referral.  We appreciate the opportunity to participate in this patient's care. Will follow along with you.     Phuc Beck MD

## 2023-02-01 ENCOUNTER — APPOINTMENT (OUTPATIENT)
Dept: GENERAL RADIOLOGY | Age: 73
DRG: 236 | End: 2023-02-01
Attending: THORACIC SURGERY (CARDIOTHORACIC VASCULAR SURGERY)
Payer: COMMERCIAL

## 2023-02-01 PROBLEM — Z95.1 S/P CABG X 5: Status: ACTIVE | Noted: 2023-01-31

## 2023-02-01 PROBLEM — R09.02 HYPOXEMIA: Status: ACTIVE | Noted: 2023-02-01

## 2023-02-01 LAB
ACT AVERAGE - AAVG: 543 SEC
ANION GAP SERPL CALC-SCNC: 5 MMOL/L (ref 2–11)
BUN SERPL-MCNC: 12 MG/DL (ref 8–23)
CALCIUM SERPL-MCNC: 7.9 MG/DL (ref 8.3–10.4)
CHLORIDE SERPL-SCNC: 116 MMOL/L (ref 101–110)
CO2 SERPL-SCNC: 23 MMOL/L (ref 21–32)
CREAT SERPL-MCNC: 0.8 MG/DL (ref 0.6–1)
EKG ATRIAL RATE: 73 BPM
EKG DIAGNOSIS: NORMAL
EKG P AXIS: 90 DEGREES
EKG P-R INTERVAL: 152 MS
EKG Q-T INTERVAL: 384 MS
EKG QRS DURATION: 82 MS
EKG QTC CALCULATION (BAZETT): 423 MS
EKG R AXIS: -11 DEGREES
EKG T AXIS: -20 DEGREES
EKG VENTRICULAR RATE: 73 BPM
ERYTHROCYTE [DISTWIDTH] IN BLOOD BY AUTOMATED COUNT: 14.6 % (ref 11.9–14.6)
GLUCOSE BLD STRIP.AUTO-MCNC: 101 MG/DL (ref 65–100)
GLUCOSE BLD STRIP.AUTO-MCNC: 106 MG/DL (ref 65–100)
GLUCOSE BLD STRIP.AUTO-MCNC: 109 MG/DL (ref 65–100)
GLUCOSE BLD STRIP.AUTO-MCNC: 110 MG/DL (ref 65–100)
GLUCOSE BLD STRIP.AUTO-MCNC: 65 MG/DL (ref 65–100)
GLUCOSE BLD STRIP.AUTO-MCNC: 87 MG/DL (ref 65–100)
GLUCOSE BLD STRIP.AUTO-MCNC: 97 MG/DL (ref 65–100)
GLUCOSE BLD STRIP.AUTO-MCNC: 97 MG/DL (ref 65–100)
GLUCOSE BLD STRIP.AUTO-MCNC: 99 MG/DL (ref 65–100)
GLUCOSE SERPL-MCNC: 102 MG/DL (ref 65–100)
HCT VFR BLD AUTO: 27.1 % (ref 35.8–46.3)
HEPARIN REQUIRED-PATIENT: 0
HEPARIN REQUIRED-TOTAL: 0 UNITS
HEPARIN TEST CONCENTRATE: 3 MG/KG
HGB BLD-MCNC: 8.7 G/DL (ref 11.7–15.4)
MAGNESIUM SERPL-MCNC: 2.4 MG/DL (ref 1.8–2.4)
MCH RBC QN AUTO: 27.8 PG (ref 26.1–32.9)
MCHC RBC AUTO-ENTMCNC: 32.1 G/DL (ref 31.4–35)
MCV RBC AUTO: 86.6 FL (ref 82–102)
MM INDURATION, POC: 0 MM (ref 0–5)
NRBC # BLD: 0 K/UL (ref 0–0.2)
PLATELET # BLD AUTO: 134 K/UL (ref 150–450)
PMV BLD AUTO: 11.7 FL (ref 9.4–12.3)
POTASSIUM SERPL-SCNC: 4.4 MMOL/L (ref 3.5–5.1)
PPD, POC: NEGATIVE
PROTAMINE DOSE-PUMP: 41 MG
PROTAMINE DOSE-TOTAL: 232 MG
RBC # BLD AUTO: 3.13 M/UL (ref 4.05–5.2)
SERVICE CMNT-IMP: ABNORMAL
SERVICE CMNT-IMP: NORMAL
SODIUM SERPL-SCNC: 144 MMOL/L (ref 133–143)
WBC # BLD AUTO: 7.8 K/UL (ref 4.3–11.1)

## 2023-02-01 PROCEDURE — 85027 COMPLETE CBC AUTOMATED: CPT

## 2023-02-01 PROCEDURE — 97161 PT EVAL LOW COMPLEX 20 MIN: CPT

## 2023-02-01 PROCEDURE — 6370000000 HC RX 637 (ALT 250 FOR IP): Performed by: PHYSICIAN ASSISTANT

## 2023-02-01 PROCEDURE — 36592 COLLECT BLOOD FROM PICC: CPT

## 2023-02-01 PROCEDURE — 93005 ELECTROCARDIOGRAM TRACING: CPT | Performed by: PHYSICIAN ASSISTANT

## 2023-02-01 PROCEDURE — 6370000000 HC RX 637 (ALT 250 FOR IP): Performed by: THORACIC SURGERY (CARDIOTHORACIC VASCULAR SURGERY)

## 2023-02-01 PROCEDURE — 83735 ASSAY OF MAGNESIUM: CPT

## 2023-02-01 PROCEDURE — 99233 SBSQ HOSP IP/OBS HIGH 50: CPT | Performed by: INTERNAL MEDICINE

## 2023-02-01 PROCEDURE — 2580000003 HC RX 258: Performed by: PHYSICIAN ASSISTANT

## 2023-02-01 PROCEDURE — 2140000001 HC CVICU INTERMEDIATE R&B

## 2023-02-01 PROCEDURE — 71045 X-RAY EXAM CHEST 1 VIEW: CPT

## 2023-02-01 PROCEDURE — 6360000002 HC RX W HCPCS: Performed by: PHYSICIAN ASSISTANT

## 2023-02-01 PROCEDURE — 6360000002 HC RX W HCPCS: Performed by: THORACIC SURGERY (CARDIOTHORACIC VASCULAR SURGERY)

## 2023-02-01 PROCEDURE — 82962 GLUCOSE BLOOD TEST: CPT

## 2023-02-01 PROCEDURE — 80048 BASIC METABOLIC PNL TOTAL CA: CPT

## 2023-02-01 PROCEDURE — 97530 THERAPEUTIC ACTIVITIES: CPT

## 2023-02-01 RX ORDER — INSULIN LISPRO 100 [IU]/ML
0-6 INJECTION, SOLUTION INTRAVENOUS; SUBCUTANEOUS NIGHTLY
Status: DISCONTINUED | OUTPATIENT
Start: 2023-02-01 | End: 2023-02-02

## 2023-02-01 RX ORDER — POTASSIUM CHLORIDE 20 MEQ/1
20 TABLET, EXTENDED RELEASE ORAL 2 TIMES DAILY PRN
Status: DISCONTINUED | OUTPATIENT
Start: 2023-02-01 | End: 2023-02-06 | Stop reason: HOSPADM

## 2023-02-01 RX ORDER — FUROSEMIDE 10 MG/ML
20 INJECTION INTRAMUSCULAR; INTRAVENOUS ONCE
Status: COMPLETED | OUTPATIENT
Start: 2023-02-01 | End: 2023-02-01

## 2023-02-01 RX ORDER — DULOXETIN HYDROCHLORIDE 60 MG/1
60 CAPSULE, DELAYED RELEASE ORAL DAILY
Status: DISCONTINUED | OUTPATIENT
Start: 2023-02-01 | End: 2023-02-06 | Stop reason: HOSPADM

## 2023-02-01 RX ORDER — LANOLIN ALCOHOL/MO/W.PET/CERES
400 CREAM (GRAM) TOPICAL 4 TIMES DAILY PRN
Status: DISCONTINUED | OUTPATIENT
Start: 2023-02-01 | End: 2023-02-06 | Stop reason: HOSPADM

## 2023-02-01 RX ORDER — ACETAMINOPHEN 325 MG/1
650 TABLET ORAL EVERY 4 HOURS PRN
Status: DISCONTINUED | OUTPATIENT
Start: 2023-02-01 | End: 2023-02-06 | Stop reason: HOSPADM

## 2023-02-01 RX ORDER — FUROSEMIDE 20 MG/1
40 TABLET ORAL DAILY
Status: COMPLETED | OUTPATIENT
Start: 2023-02-02 | End: 2023-02-04

## 2023-02-01 RX ORDER — GABAPENTIN 300 MG/1
300 CAPSULE ORAL 3 TIMES DAILY
Status: DISCONTINUED | OUTPATIENT
Start: 2023-02-01 | End: 2023-02-06 | Stop reason: HOSPADM

## 2023-02-01 RX ORDER — POTASSIUM CHLORIDE 20 MEQ/1
40 TABLET, EXTENDED RELEASE ORAL 2 TIMES DAILY PRN
Status: DISCONTINUED | OUTPATIENT
Start: 2023-02-01 | End: 2023-02-06 | Stop reason: HOSPADM

## 2023-02-01 RX ORDER — POTASSIUM CHLORIDE 750 MG/1
10 TABLET, EXTENDED RELEASE ORAL DAILY
Status: COMPLETED | OUTPATIENT
Start: 2023-02-02 | End: 2023-02-04

## 2023-02-01 RX ORDER — LANOLIN ALCOHOL/MO/W.PET/CERES
400 CREAM (GRAM) TOPICAL 3 TIMES DAILY PRN
Status: DISCONTINUED | OUTPATIENT
Start: 2023-02-01 | End: 2023-02-06 | Stop reason: HOSPADM

## 2023-02-01 RX ORDER — ONDANSETRON 2 MG/ML
4 INJECTION INTRAMUSCULAR; INTRAVENOUS EVERY 6 HOURS PRN
Status: DISCONTINUED | OUTPATIENT
Start: 2023-02-01 | End: 2023-02-06 | Stop reason: HOSPADM

## 2023-02-01 RX ORDER — FAMOTIDINE 20 MG/1
20 TABLET, FILM COATED ORAL 2 TIMES DAILY
Status: DISCONTINUED | OUTPATIENT
Start: 2023-02-01 | End: 2023-02-06 | Stop reason: HOSPADM

## 2023-02-01 RX ORDER — INSULIN LISPRO 100 [IU]/ML
0-12 INJECTION, SOLUTION INTRAVENOUS; SUBCUTANEOUS
Status: DISCONTINUED | OUTPATIENT
Start: 2023-02-01 | End: 2023-02-02

## 2023-02-01 RX ORDER — DEXTROSE MONOHYDRATE 100 MG/ML
INJECTION, SOLUTION INTRAVENOUS CONTINUOUS PRN
Status: DISCONTINUED | OUTPATIENT
Start: 2023-02-01 | End: 2023-02-06 | Stop reason: HOSPADM

## 2023-02-01 RX ORDER — TRAMADOL HYDROCHLORIDE 50 MG/1
100 TABLET ORAL EVERY 6 HOURS PRN
Status: DISCONTINUED | OUTPATIENT
Start: 2023-02-01 | End: 2023-02-06 | Stop reason: HOSPADM

## 2023-02-01 RX ORDER — ONDANSETRON 4 MG/1
4 TABLET, ORALLY DISINTEGRATING ORAL EVERY 8 HOURS PRN
Status: DISCONTINUED | OUTPATIENT
Start: 2023-02-01 | End: 2023-02-06 | Stop reason: HOSPADM

## 2023-02-01 RX ORDER — TIZANIDINE 2 MG/1
2 TABLET ORAL 3 TIMES DAILY
Status: DISCONTINUED | OUTPATIENT
Start: 2023-02-01 | End: 2023-02-03

## 2023-02-01 RX ORDER — TRAMADOL HYDROCHLORIDE 50 MG/1
50 TABLET ORAL EVERY 6 HOURS PRN
Status: DISCONTINUED | OUTPATIENT
Start: 2023-02-01 | End: 2023-02-06 | Stop reason: HOSPADM

## 2023-02-01 RX ORDER — SENNA AND DOCUSATE SODIUM 50; 8.6 MG/1; MG/1
1 TABLET, FILM COATED ORAL 2 TIMES DAILY
Status: DISCONTINUED | OUTPATIENT
Start: 2023-02-01 | End: 2023-02-03

## 2023-02-01 RX ORDER — SODIUM CHLORIDE 0.9 % (FLUSH) 0.9 %
5-40 SYRINGE (ML) INJECTION PRN
Status: DISCONTINUED | OUTPATIENT
Start: 2023-02-01 | End: 2023-02-06 | Stop reason: HOSPADM

## 2023-02-01 RX ORDER — SODIUM CHLORIDE 0.9 % (FLUSH) 0.9 %
5-40 SYRINGE (ML) INJECTION EVERY 12 HOURS SCHEDULED
Status: DISCONTINUED | OUTPATIENT
Start: 2023-02-01 | End: 2023-02-06 | Stop reason: HOSPADM

## 2023-02-01 RX ORDER — POLYETHYLENE GLYCOL 3350 17 G/17G
17 POWDER, FOR SOLUTION ORAL DAILY PRN
Status: DISCONTINUED | OUTPATIENT
Start: 2023-02-01 | End: 2023-02-06 | Stop reason: HOSPADM

## 2023-02-01 RX ADMIN — AMIODARONE HYDROCHLORIDE 200 MG: 200 TABLET ORAL at 08:20

## 2023-02-01 RX ADMIN — KETOROLAC TROMETHAMINE 15 MG: 15 INJECTION, SOLUTION INTRAMUSCULAR; INTRAVENOUS at 08:24

## 2023-02-01 RX ADMIN — CEFAZOLIN SODIUM 2000 MG: 100 INJECTION, POWDER, LYOPHILIZED, FOR SOLUTION INTRAVENOUS at 04:00

## 2023-02-01 RX ADMIN — ATORVASTATIN CALCIUM 80 MG: 80 TABLET, FILM COATED ORAL at 20:27

## 2023-02-01 RX ADMIN — Medication 1 AMPULE: at 20:57

## 2023-02-01 RX ADMIN — FAMOTIDINE 20 MG: 20 TABLET, FILM COATED ORAL at 20:28

## 2023-02-01 RX ADMIN — SODIUM CHLORIDE, PRESERVATIVE FREE 10 ML: 5 INJECTION INTRAVENOUS at 20:29

## 2023-02-01 RX ADMIN — ASPIRIN 81 MG: 81 TABLET, COATED ORAL at 08:20

## 2023-02-01 RX ADMIN — AMIODARONE HYDROCHLORIDE 200 MG: 200 TABLET ORAL at 20:28

## 2023-02-01 RX ADMIN — KETOROLAC TROMETHAMINE 15 MG: 15 INJECTION, SOLUTION INTRAMUSCULAR; INTRAVENOUS at 02:40

## 2023-02-01 RX ADMIN — SODIUM CHLORIDE, PRESERVATIVE FREE 10 ML: 5 INJECTION INTRAVENOUS at 08:21

## 2023-02-01 RX ADMIN — GABAPENTIN 300 MG: 300 CAPSULE ORAL at 16:47

## 2023-02-01 RX ADMIN — METOPROLOL TARTRATE 12.5 MG: 25 TABLET, FILM COATED ORAL at 11:21

## 2023-02-01 RX ADMIN — TIZANIDINE 2 MG: 2 TABLET ORAL at 16:47

## 2023-02-01 RX ADMIN — SENNOSIDES AND DOCUSATE SODIUM 1 TABLET: 8.6; 5 TABLET ORAL at 20:27

## 2023-02-01 RX ADMIN — FAMOTIDINE 20 MG: 20 TABLET, FILM COATED ORAL at 08:20

## 2023-02-01 RX ADMIN — FUROSEMIDE 20 MG: 10 INJECTION, SOLUTION INTRAMUSCULAR; INTRAVENOUS at 08:20

## 2023-02-01 RX ADMIN — TIZANIDINE 2 MG: 2 TABLET ORAL at 11:21

## 2023-02-01 RX ADMIN — GABAPENTIN 300 MG: 300 CAPSULE ORAL at 20:28

## 2023-02-01 RX ADMIN — DULOXETINE HYDROCHLORIDE 60 MG: 60 CAPSULE, DELAYED RELEASE ORAL at 08:19

## 2023-02-01 ASSESSMENT — PAIN DESCRIPTION - LOCATION
LOCATION: BACK

## 2023-02-01 ASSESSMENT — PAIN SCALES - GENERAL
PAINLEVEL_OUTOF10: 4
PAINLEVEL_OUTOF10: 3
PAINLEVEL_OUTOF10: 4
PAINLEVEL_OUTOF10: 7
PAINLEVEL_OUTOF10: 7
PAINLEVEL_OUTOF10: 4

## 2023-02-01 ASSESSMENT — PAIN DESCRIPTION - PAIN TYPE
TYPE: CHRONIC PAIN
TYPE: CHRONIC PAIN

## 2023-02-01 ASSESSMENT — PAIN DESCRIPTION - ORIENTATION
ORIENTATION: MID;POSTERIOR
ORIENTATION: LOWER;POSTERIOR
ORIENTATION: LOWER;POSTERIOR
ORIENTATION: LOWER

## 2023-02-01 ASSESSMENT — PAIN - FUNCTIONAL ASSESSMENT
PAIN_FUNCTIONAL_ASSESSMENT: ACTIVITIES ARE NOT PREVENTED
PAIN_FUNCTIONAL_ASSESSMENT: ACTIVITIES ARE NOT PREVENTED

## 2023-02-01 ASSESSMENT — PAIN DESCRIPTION - DESCRIPTORS
DESCRIPTORS: ACHING

## 2023-02-01 NOTE — PROGRESS NOTES
R IJ cordis, kaplan, MCT and PCT removed with no complications. Petroleum gauze, 4x4 dressing placed over CT sites. Occlusive dressing placed over cordis site after hemostasis achieved. VSS. Pt tolerated well.

## 2023-02-01 NOTE — PROGRESS NOTES
POD 1 Day Post-Op    Procedure:  Procedure(s):  CABG CORONARY ARTERY BYPASS,(CABGX5), LIMA, ENDOSCOPIC GREATER SAPHENOUS VEIN HARVEST LEFT LEG, TRANSESOPHOGEAL ECHOCARDIOGRAM      Subjective:     Patient has No significant medical complaints      Objective:     Patient Vitals for the past 8 hrs:   BP Temp Temp src Pulse Resp SpO2 Weight   23 0700 (!) 108/55 -- -- 75 14 100 % --   23 0630 (!) 109/54 -- -- 71 15 100 % --   23 0600 (!) 112/55 -- -- 69 24 100 % --   23 0548 (!) 111/56 -- -- 73 16 97 % 219 lb 9.3 oz (99.6 kg)   23 0530 -- -- -- 78 26 95 % --   23 0500 113/60 -- -- 78 17 99 % --   23 0430 (!) 103/54 -- -- 71 18 100 % --   23 0400 (!) 104/56 98.4 °F (36.9 °C) Oral 74 15 100 % --   23 0330 (!) 100/55 -- -- 71 13 100 % --   23 0300 (!) 91/52 -- -- 71 15 100 % --   23 0249 (!) 100/53 -- -- 72 14 100 % --   23 0230 (!) 98/54 -- -- 75 12 100 % --   23 0215 -- -- -- 71 12 100 % --   23 0200 (!) 98/52 99.5 °F (37.5 °C) Oral 71 14 100 % --   23 0145 -- -- -- 69 14 100 % --   23 0130 (!) 94/50 -- -- 68 12 100 % --   23 0115 -- -- -- 66 13 100 % --   23 0100 (!) 94/53 (!) 100.9 °F (38.3 °C) Bladder 70 19 100 % --   23 0030 (!) 91/52 -- -- 70 16 100 % --   23 0015 -- (!) 100.9 °F (38.3 °C) Bladder 65 15 100 % --   23 0000 (!) 92/50 -- Oral 64 27 100 % --   23 2345 -- -- -- 65 13 100 % --   23 2330 (!) 89/50 -- -- 63 18 100 % --   23 2315 (!) 94/50 -- -- 67 28 100 % --     Temp (24hrs), Av.6 °F (37 °C), Min:97 °F (36.1 °C), Max:100.9 °F (38.3 °C)        Hemodynamics    PAP    CO    CI    No intake/output data recorded.  1901 -  0700  In: 4639 [P.O.:300;  I.V.:3839]  Out: 2270 [Urine:]    CT Drainage              total of all CT's    Heart:  regular rate and rhythm, S1, S2 normal, no murmur, click, rub or gallop  Lung:  clear to auscultation bilaterally  Neuro: Grossly non focal  Incisions: Clean, dry, and intact    Labs:  Recent Results (from the past 24 hour(s))   POCT Blood Gas & Electrolytes    Collection Time: 01/31/23  8:22 AM   Result Value Ref Range    pH, Arterial, POC 7.44 7.35 - 7.45      pCO2, Arterial, POC 34.9 (L) 35 - 45 MMHG    pO2, Arterial,  (H) 75 - 100 MMHG    POC Sodium 143 136 - 145 MMOL/L    POC Potassium 3.7 3.5 - 5.1 MMOL/L    POC Ionized Calcium 1.23 1.12 - 1.32 mmol/L    POC Glucose 116 (H) 65 - 100 MG/DL    BASE DEFICIT (POC) 0.1 mmol/L    HCO3, Mixed 23.7 22 - 26 MMOL/L    POC TCO2 23 13 - 23 MMOL/L    POC O2  %    Source ARTERIAL      Performed by: [employer]     eGFR, POC Cannot be calculated >60 ml/min/1.73m2   POC Heparin Assay    Collection Time: 01/31/23  8:23 AM   Result Value Ref Range    Apache 93      Projected heparin concentration 2.8 mg/kg    Baseline  sec    Heparin Bolus-Patient 21,280 units    Heparin Bolus-Pump 0 units    Heparin Bolus-Total 21,280 units   POCT Blood Gas & Electrolytes    Collection Time: 01/31/23 10:14 AM   Result Value Ref Range    pH, Arterial, POC 7.34 (L) 7.35 - 7.45      pCO2, Arterial, POC 42.2 35 - 45 MMHG    pO2, Arterial,  (H) 75 - 100 MMHG    POC Sodium 142 136 - 145 MMOL/L    POC Potassium 3.8 3.5 - 5.1 MMOL/L    POC Ionized Calcium 1.23 1.12 - 1.32 mmol/L    POC Glucose 152 (H) 65 - 100 MG/DL    BASE DEFICIT (POC) 3.1 mmol/L    HCO3, Mixed 22.6 22 - 26 MMOL/L    POC TCO2 22 13 - 23 MMOL/L    POC O2  %    Source ARTERIAL      Performed by: [employer]     eGFR, POC Cannot be calculated >60 ml/min/1.73m2   POCT Blood Gas & Electrolytes    Collection Time: 01/31/23 10:45 AM   Result Value Ref Range    pH, Arterial, POC 7.34 (L) 7.35 - 7.45      pCO2, Arterial, POC 41.9 35 - 45 MMHG    pO2, Arterial,  (H) 75 - 100 MMHG    POC Sodium 138 136 - 145 MMOL/L    POC Potassium 4.8 3.5 - 5.1 MMOL/L    POC Ionized Calcium 1.17 1.12 - 1.32 mmol/L    POC Glucose 139 (H) 65 - 100 MG/DL    BASE DEFICIT (POC) 2.9 mmol/L    HCO3, Mixed 22.6 22 - 26 MMOL/L    POC TCO2 22 13 - 23 MMOL/L    POC O2  %    Source ARTERIAL      Performed by: Mercedes     eGFR, POC Cannot be calculated >60 ml/min/1.73m2   POC Heparin Protamine Titration    Collection Time: 01/31/23 10:46 AM   Result Value Ref Range    ACT AVERAGE - AAVG 583 sec    Heparin Required-Patient 1,647      Heparin Required-Total 2,000 units    Protamine Dose-Pump 34 mg    Heparin Test Concentrate 2.5 mg/kg    Protamine Dose-Total 193 mg   POCT Blood Gas & Electrolytes    Collection Time: 01/31/23 11:20 AM   Result Value Ref Range    pH, Arterial, POC 7.35 7.35 - 7.45      pCO2, Arterial, POC 40.1 35 - 45 MMHG    pO2, Arterial,  (H) 75 - 100 MMHG    POC Sodium 141 136 - 145 MMOL/L    POC Potassium 4.2 3.5 - 5.1 MMOL/L    POC Ionized Calcium 1.18 1.12 - 1.32 mmol/L    POC Glucose 143 (H) 65 - 100 MG/DL    BASE DEFICIT (POC) 3.2 mmol/L    HCO3, Mixed 22.1 22 - 26 MMOL/L    POC TCO2 21 13 - 23 MMOL/L    POC O2  %    Source ARTERIAL      Performed by: Mercedes     eGFR, POC Cannot be calculated >60 ml/min/1.73m2   POC Heparin Protamine Titration    Collection Time: 01/31/23 11:20 AM   Result Value Ref Range    ACT AVERAGE - AAVG 594 sec    Heparin Required-Patient 1,647      Heparin Required-Total 2,000 units    Protamine Dose-Pump 34 mg    Heparin Test Concentrate 2.5 mg/kg    Protamine Dose-Total 193 mg   POCT Blood Gas & Electrolytes    Collection Time: 01/31/23 11:46 AM   Result Value Ref Range    pH, Arterial, POC 7.32 (L) 7.35 - 7.45      pCO2, Arterial, POC 43.2 35 - 45 MMHG    pO2, Arterial,  (H) 75 - 100 MMHG    POC Sodium 141 136 - 145 MMOL/L    POC Potassium 4.2 3.5 - 5.1 MMOL/L    POC Ionized Calcium 1.15 1.12 - 1.32 mmol/L    POC Glucose 144 (H) 65 - 100 MG/DL    BASE DEFICIT (POC) 3.4 mmol/L    HCO3, Mixed 22.4 22 - 26 MMOL/L    POC TCO2 22 13 - 23 MMOL/L    POC O2  %    Source ARTERIAL      Performed by: [employer]     eGFR, POC Cannot be calculated >60 ml/min/1.73m2   POC Heparin Protamine Titration    Collection Time: 01/31/23 11:47 AM   Result Value Ref Range    ACT AVERAGE - AAVG 638 sec    Heparin Required-Patient 1,647      Heparin Required-Total 2,000 units    Protamine Dose-Pump 34 mg    Heparin Test Concentrate 2.5 mg/kg    Protamine Dose-Total 193 mg   POCT Blood Gas & Electrolytes    Collection Time: 01/31/23 12:24 PM   Result Value Ref Range    pH, Arterial, POC 7.35 7.35 - 7.45      pCO2, Arterial, POC 37.9 35 - 45 MMHG    pO2, Arterial,  (H) 75 - 100 MMHG    POC Sodium 143 136 - 145 MMOL/L    POC Potassium 3.5 3.5 - 5.1 MMOL/L    POC Ionized Calcium 1.33 (H) 1.12 - 1.32 mmol/L    POC Glucose 127 (H) 65 - 100 MG/DL    BASE DEFICIT (POC) 4.4 mmol/L    HCO3, Mixed 20.8 (L) 22 - 26 MMOL/L    POC TCO2 20 13 - 23 MMOL/L    POC O2  %    Source ARTERIAL      Performed by: [employer]     eGFR, POC Cannot be calculated >60 ml/min/1.73m2   POC Coagulation Time, Activated    Collection Time: 01/31/23 12:25 PM   Result Value Ref Range    ACT AVERAGE - AAVG 119 sec   Arterial Blood Gas, POC    Collection Time: 01/31/23  1:14 PM   Result Value Ref Range    DEVICE ADULT VENT      FIO2 100 %    pH, Arterial, POC 7.36 7.35 - 7.45      pCO2, Arterial, POC 39.6 35 - 45 MMHG    pO2, Arterial,  (H) 75 - 100 MMHG    HCO3, Mixed 22.3 22 - 26 MMOL/L    SO2c, Arterial, .0 (H) 95 - 98 %    BASE DEFICIT (POC) 2.9 mmol/L    Mode Pressure regulated volume control      POC TIDAL VOLUME 420 ml    POC PEEP 8 cmH2O    POC Shiv's Test NOT APPLICABLE      Site DRAWN FROM ARTERIAL LINE      Specimen type: ARTERIAL      Performed by: Elias Urrutia    POCT Glucose    Collection Time: 01/31/23  1:14 PM   Result Value Ref Range    POC Glucose 105 (H) 65 - 100 mg/dL    Performed by: Nguyen    Basic Metabolic Panel    Collection Time: 01/31/23  1:16 PM   Result Value Ref Range    Sodium 146 (H) 133 - 143 mmol/L    Potassium 3.8 3.5 - 5.1 mmol/L    Chloride 116 (H) 101 - 110 mmol/L    CO2 23 21 - 32 mmol/L    Anion Gap 7 2 - 11 mmol/L    Glucose 112 (H) 65 - 100 mg/dL    BUN 13 8 - 23 MG/DL    Creatinine 0.80 0.6 - 1.0 MG/DL    Est, Glom Filt Rate >60 >60 ml/min/1.73m2    Calcium 8.3 8.3 - 10.4 MG/DL   CBC    Collection Time: 01/31/23  1:16 PM   Result Value Ref Range    WBC 17.8 (H) 4.3 - 11.1 K/uL    RBC 3.37 (L) 4.05 - 5.2 M/uL    Hemoglobin 9.4 (L) 11.7 - 15.4 g/dL    Hematocrit 29.0 (L) 35.8 - 46.3 %    MCV 86.1 82 - 102 FL    MCH 27.9 26.1 - 32.9 PG    MCHC 32.4 31.4 - 35.0 g/dL    RDW 14.2 11.9 - 14.6 %    Platelets 298 673 - 863 K/uL    MPV 10.8 9.4 - 12.3 FL    nRBC 0.00 0.0 - 0.2 K/uL   Protime-INR    Collection Time: 01/31/23  1:16 PM   Result Value Ref Range    Protime 19.6 (H) 12.6 - 14.3 sec    INR 1.6     APTT    Collection Time: 01/31/23  1:16 PM   Result Value Ref Range    PTT 33.3 24.5 - 34.2 SEC   Magnesium    Collection Time: 01/31/23  1:16 PM   Result Value Ref Range    Magnesium 2.9 (H) 1.8 - 2.4 mg/dL   Fibrinogen    Collection Time: 01/31/23  1:16 PM   Result Value Ref Range    Fibrinogen 142 (L) 190 - 501 mg/dL   EKG 12 lead    Collection Time: 01/31/23  1:47 PM   Result Value Ref Range    Ventricular Rate 48 BPM    Atrial Rate 48 BPM    P-R Interval 168 ms    QRS Duration 88 ms    Q-T Interval 564 ms    QTc Calculation (Bazett) 503 ms    P Axis 89 degrees    R Axis 32 degrees    T Axis 193 degrees    Diagnosis Sinus bradycardia    POCT Glucose    Collection Time: 01/31/23  2:10 PM   Result Value Ref Range    POC Glucose 58 (L) 65 - 100 mg/dL    Performed by: Nguyen    POCT Glucose    Collection Time: 01/31/23  2:39 PM   Result Value Ref Range    POC Glucose 86 65 - 100 mg/dL    Performed by: Nguyen    Urinalysis with Reflex to Culture    Collection Time: 01/31/23  3:54 PM    Specimen: Urine   Result Value Ref Range    Color, UA YELLOW/STRAW      Appearance CLEAR      Specific Gravity, UA 1.022 1.001 - 1.023      pH, Urine 5.5 5.0 - 9.0      Protein, UA Negative NEG mg/dL    Glucose, UA Negative mg/dL    Ketones, Urine Negative NEG mg/dL    Bilirubin Urine Negative NEG      Blood, Urine Negative NEG      Urobilinogen, Urine 0.2 0.2 - 1.0 EU/dL    Nitrite, Urine Negative NEG      Leukocyte Esterase, Urine TRACE (A) NEG      Urine Culture if Indicated URINE CULTURE ORDERED      WBC, UA 20-50 (A) U4 /hpf    RBC, UA 0-5 U5 /hpf    BACTERIA, URINE Negative NEG /hpf    Epithelial Cells UA 0-5 U5 /hpf    Casts 0-2 U2 /lpf    Mucus, UA 0 0 /lpf   POCT Glucose    Collection Time: 01/31/23  3:55 PM   Result Value Ref Range    POC Glucose 140 (H) 65 - 100 mg/dL    Performed by: Nguyen    CBC    Collection Time: 01/31/23  4:27 PM   Result Value Ref Range    WBC 25.7 (H) 4.3 - 11.1 K/uL    RBC 3.66 (L) 4.05 - 5.2 M/uL    Hemoglobin 10.2 (L) 11.7 - 15.4 g/dL    Hematocrit 31.9 (L) 35.8 - 46.3 %    MCV 87.2 82 - 102 FL    MCH 27.9 26.1 - 32.9 PG    MCHC 32.0 31.4 - 35.0 g/dL    RDW 14.4 11.9 - 14.6 %    Platelets 695 209 - 225 K/uL    MPV 11.1 9.4 - 12.3 FL    nRBC 0.00 0.0 - 0.2 K/uL   Magnesium    Collection Time: 01/31/23  4:27 PM   Result Value Ref Range    Magnesium 2.5 (H) 1.8 - 2.4 mg/dL   Potassium    Collection Time: 01/31/23  4:27 PM   Result Value Ref Range    Potassium 4.1 3.5 - 5.1 mmol/L   POCT Glucose    Collection Time: 01/31/23  5:10 PM   Result Value Ref Range    POC Glucose 108 (H) 65 - 100 mg/dL    Performed by: Nguyen    POCT Glucose    Collection Time: 01/31/23  6:53 PM   Result Value Ref Range    POC Glucose 116 (H) 65 - 100 mg/dL    Performed by: Nguyen    POCT Glucose    Collection Time: 01/31/23  8:38 PM   Result Value Ref Range    POC Glucose 164 (H) 65 - 100 mg/dL    Performed by: Ramona    Potassium    Collection Time: 01/31/23  8:46 PM   Result Value Ref Range Potassium 4.6 3.5 - 5.1 mmol/L   Magnesium    Collection Time: 01/31/23  8:46 PM   Result Value Ref Range    Magnesium 2.3 1.8 - 2.4 mg/dL   Hemoglobin and Hematocrit    Collection Time: 01/31/23  8:46 PM   Result Value Ref Range    Hemoglobin 8.9 (L) 11.7 - 15.4 g/dL    Hematocrit 27.9 (L) 35.8 - 46.3 %   POCT Glucose    Collection Time: 01/31/23 10:12 PM   Result Value Ref Range    POC Glucose 146 (H) 65 - 100 mg/dL    Performed by: Ramona    POCT Glucose    Collection Time: 01/31/23 11:08 PM   Result Value Ref Range    POC Glucose 126 (H) 65 - 100 mg/dL    Performed by: Norberto    POCT Glucose    Collection Time: 02/01/23 12:51 AM   Result Value Ref Range    POC Glucose 109 (H) 65 - 100 mg/dL    Performed by: Ramona    POCT Glucose    Collection Time: 02/01/23  2:27 AM   Result Value Ref Range    POC Glucose 99 65 - 100 mg/dL    Performed by: Ramona    CBC    Collection Time: 02/01/23  2:49 AM   Result Value Ref Range    WBC 7.8 4.3 - 11.1 K/uL    RBC 3.13 (L) 4.05 - 5.2 M/uL    Hemoglobin 8.7 (L) 11.7 - 15.4 g/dL    Hematocrit 27.1 (L) 35.8 - 46.3 %    MCV 86.6 82 - 102 FL    MCH 27.8 26.1 - 32.9 PG    MCHC 32.1 31.4 - 35.0 g/dL    RDW 14.6 11.9 - 14.6 %    Platelets 039 (L) 043 - 450 K/uL    MPV 11.7 9.4 - 12.3 FL    nRBC 0.00 0.0 - 0.2 K/uL   Basic Metabolic Panel    Collection Time: 02/01/23  2:49 AM   Result Value Ref Range    Sodium 144 (H) 133 - 143 mmol/L    Potassium 4.4 3.5 - 5.1 mmol/L    Chloride 116 (H) 101 - 110 mmol/L    CO2 23 21 - 32 mmol/L    Anion Gap 5 2 - 11 mmol/L    Glucose 102 (H) 65 - 100 mg/dL    BUN 12 8 - 23 MG/DL    Creatinine 0.80 0.6 - 1.0 MG/DL    Est, Glom Filt Rate >60 >60 ml/min/1.73m2    Calcium 7.9 (L) 8.3 - 10.4 MG/DL   Magnesium    Collection Time: 02/01/23  2:49 AM   Result Value Ref Range    Magnesium 2.4 1.8 - 2.4 mg/dL   POCT Glucose    Collection Time: 02/01/23  3:54 AM   Result Value Ref Range    POC Glucose 106 (H) 65 - 100 mg/dL Performed by: Ramona        Assessment:     Principal Problem:    CAD, multiple vessel  Active Problems:    Heart failure with reduced ejection fraction (HCC)    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  *      Plan/Recommendations/Medical Decision Making:     Discontinue:  chest tubes  Stable, to floor, protocol  See orders

## 2023-02-01 NOTE — PROGRESS NOTES
ACUTE PHYSICAL THERAPY GOALS:   (Developed with and agreed upon by patient and/or caregiver. )  LTG:  (1.)Ms. Denny Estrada will move from supine to sit and sit to supine , scoot up and down, and roll side to side in bed with STAND BY ASSIST within 7 treatment day(s). (2.)Ms. Denny Estrada will transfer from bed to chair and chair to bed with STAND BY ASSIST using the least restrictive device within 7 treatment day(s). (3.)Ms. Denny Estrada will ambulate with STAND BY ASSIST for 100 feet with the least restrictive device within 7 treatment day(s). (4.)Ms. Denny Estrada will perform seated LE exercises for 15 minutes to improve strength and mobility within 7 days. ________________________________________________________________________________________________      PHYSICAL THERAPY Initial Assessment and AM  (Link to Caseload Tracking: PT Visit Days : 1  Acknowledge Orders  Time In/Out  PT Charge Capture  Rehab Caseload Tracker    Jon Turner is a 67 y.o. 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) [I25.110]  Chest pain [R07.9]  Heart failure, systolic (HCC) [G16.10]  Abnormal stress test [R94.39]  CAD, multiple vessel [I25.10]  Procedure(s) (LRB):  CABG CORONARY ARTERY BYPASS,(CABGX5), LIMA, ENDOSCOPIC GREATER SAPHENOUS VEIN HARVEST LEFT LEG, TRANSESOPHOGEAL ECHOCARDIOGRAM (N/A)  1 Day Post-Op  Reason for Referral: Generalized Muscle Weakness (M62.81)  Difficulty in walking, Not elsewhere classified (R26.2)  Other abnormalities of gait and mobility (R26.89)  Low Back Pain (M54.5)  Inpatient: Payor: Ravinder Veliz / Plan: Antoinette Mcmanus PPO / Product Type: Medicare /     ASSESSMENT:     REHAB RECOMMENDATIONS:   Recommendation to date pending progress:  Setting:  Short-term Rehab    Equipment:    To Be Determined     ASSESSMENT:  Ms. Denny Estrada is seen for initial therapy assessment following CABG x 5.  She lives with spouse and uses rollator at baseline, gait distance is limited by chronic back pain. Presents up in recliner with c/o dizziness, pain, weakness, nausea. Vitals are stable. Performs sit-stand transfer with verbal cues and mod assist, and cues to push through legs/minimize upper extremity use. Pt unable to take steps, returned to sitting for break. Stood again and continues to report dizziness and weakness. Took 2-3 small steps with strong lateral lean and poor balance needing constant assist. Max-total assist to scoot back in chair and reposition. Ms. Joselin Khan is currently functioning well below baseline with strength and mobility; she will need continued therapy during hospital stay and likely rehab at discharge. [employer] [facility] 6 Clicks Basic Mobility Inpatient Short Form  AM-PAC Mobility Inpatient   How much difficulty turning over in bed?: A Lot  How much difficulty sitting down on / standing up from a chair with arms?: A Lot  How much difficulty moving from lying on back to sitting on side of bed?: A Lot  How much help from another person moving to and from a bed to a chair?: A Lot  How much help from another person needed to walk in hospital room?: Total  How much help from another person for climbing 3-5 steps with a railing?: Total  AM-PAC Inpatient Mobility Raw Score : 10  AM-PAC Inpatient T-Scale Score : 32.29  Mobility Inpatient CMS 0-100% Score: 76.75  Mobility Inpatient CMS G-Code Modifier : CL    SUBJECTIVE:   Ms. Joselin Khan states, \"I'm dizzy, I can't\"     Social/Functional  Pt lives with spouse, reports using rollator at baseline for limited gait distance due to chronic back pain. Was scheduled for back surgery Jan 19th.      OBJECTIVE:     PAIN: VITALS / O2: PRECAUTION / Geryl Nickolas / DRAINS:   Pre Treatment:          Post Treatment: 2/10 Vitals BP stable during session       Oxygen on RA      Chest Tube and Menezes Catheter    RESTRICTIONS/PRECAUTIONS:                    GROSS EVALUATION: Intact Impaired (Comments):   AROM [x]     PROM []    Strength []  Generally decreased   Balance []  Poor standing   Posture [] N/A  Forward Head  Rounded Shoulders  Trunk Flexion   Sensation []     Coordination []      Tone []     Edema []    Activity Tolerance []  poor    []      COGNITION/  PERCEPTION: Intact Impaired (Comments):   Orientation []     Vision [x]     Hearing [x]     Cognition  []       MOBILITY: I Mod I S SBA CGA Min Mod Max Total  NT x2 Comments:   Bed Mobility    Rolling [] [] [] [] [] [] [] [] [] [x] []    Supine to Sit [] [] [] [] [] [] [] [] [] [x] []    Scooting [] [] [] [] [] [] [] [] [] [x] []    Sit to Supine [] [] [] [] [] [] [] [] [] [x] []    Transfers    Sit to Stand [] [] [] [] [] [] [x] [] [] [] []    Bed to Chair [] [] [] [] [] [] [] [] [] [] []    Stand to Sit [] [] [] [] [] [] [x] [] [] [] []     [] [] [] [] [] [] [] [] [] [] []    I=Independent, Mod I=Modified Independent, S=Supervision, SBA=Standby Assistance, CGA=Contact Guard Assistance,   Min=Minimal Assistance, Mod=Moderate Assistance, Max=Maximal Assistance, Total=Total Assistance, NT=Not Tested    GAIT: I Mod I S SBA CGA Min Mod Max Total  NT x2 Comments:   Level of Assistance [] [] [] [] [] [] [x] [] [] [] []    Distance 2-3  feet    DME Cardiac Walker    Gait Quality Decreased leandro , Decreased step clearance, Decreased step length, and Trunk sway increased    Weightbearing Status      Stairs      I=Independent, Mod I=Modified Independent, S=Supervision, SBA=Standby Assistance, CGA=Contact Guard Assistance,   Min=Minimal Assistance, Mod=Moderate Assistance, Max=Maximal Assistance, Total=Total Assistance, NT=Not Tested    PLAN:   FREQUENCY AND DURATION: BID for duration of hospital stay or until stated goals are met, whichever comes first.    THERAPY PROGNOSIS: Fair    PROBLEM LIST:   (Skilled intervention is medically necessary to address:)  Decreased Activity Tolerance  Decreased AROM/PROM  Decreased Balance  Decreased Coordination  Decreased Gait Ability  Decreased Safety Awareness  Decreased Strength  Decreased Transfer Abilities  Increased Pain INTERVENTIONS PLANNED:   (Benefits and precautions of physical therapy have been discussed with the patient.)  Therapeutic Activity  Therapeutic Exercise/HEP  Neuromuscular Re-education  Gait Training  Education       TREATMENT:   EVALUATION: MODERATE COMPLEXITY: (Untimed Charge)    TREATMENT:   Therapeutic Activity (10 Minutes): Therapeutic activity included Scooting, Transfer Training, Ambulation on level ground, Sitting balance , and Standing balance to improve functional Activity tolerance, Balance, Coordination, Mobility, and Strength.     TREATMENT GRID:  N/A    AFTER TREATMENT PRECAUTIONS: Bed/Chair Locked, Call light within reach, Chair, Needs within reach, and RN at bedside    INTERDISCIPLINARY COLLABORATION:  RN/ PCT and PT/ PTA    EDUCATION: Education Given To: Patient  Education Provided: Role of Therapy;Plan of Care  Education Method: Demonstration;Verbal  Barriers to Learning: Cognition  Education Outcome: Continued education needed    TIME IN/OUT:  Time In: 0840  Time Out: 0900  Minutes: [address], PT

## 2023-02-01 NOTE — PROGRESS NOTES
Pulmonary CV progress Note: 2/1/2023        Nicol Palomares                                                     Admission Date: 1/31/2023     The patient's chart is reviewed and the patient is discussed with the staff. Background:     Patient is seen at the request of Aline Urrutia MD  for respiratory management status post cardiac surgery. Patient has a history of  CAD, multiple vessel, chronic back pain due to spondylosis and radiculopathy, PTSD with anxiety with medical procedures, never smoking. Currently is sedated in CV-ICU and orally intubated receiving  mechanical ventilation. We have been asked to see in the CV-ICU for mechanical ventilation management and weaning after CABG  x 5.    1 Day Post-Op    Subjective:     Extubated last night  On 3L NC    Objective:   Blood pressure (!) 108/55, pulse 75, temperature 98.4 °F (36.9 °C), temperature source Oral, resp. rate 14, height 5' 4\" (1.626 m), weight 219 lb 9.3 oz (99.6 kg), SpO2 100 %. Intake/Output Summary (Last 24 hours) at 2/1/2023 3123  Last data filed at 2/1/2023 1063  Gross per 24 hour   Intake 5264.73 ml   Output 2270 ml   Net 2994.73 ml     Physical Exam:          Constitutional:  Awake, calm  EENMT:  Sclera clear, pupils equal  Respiratory: clear  Cardiovascular:  RRR with no M,G,R;  Gastrointestinal:  soft; no bowel sounds present  Musculoskeletal:  warm with no cyanosis, no lower extremity edema. SKIN:  no jaundice or ecchymosis   Neurologic:  moving all extremities, fluent speech  Psychiatric:  calm    CXR:  21/23      LINES:  kaplan, swan ba, arterial line, chest tubes times 2 in epigastric area without air leak.     DRIPS:   Dose (mcg/kg/hr) Dexmedetomidine: 0 mcg/kg/hr  Dose (mcg/min) Epinephrine: 0 mcg/min  Dose (mcg/kg/min) Phenylephrine : *0 mcg (Anesthesia infusion automatically stopped by extension.)  Dose (mg/hr) Nicardipine: *0.2 mg  Dose (units/hr) Insulin : 0 Units/hr     CI:  CO (l/min): 5.2 l/min    Ventilator Settings  Mode FIO2 Rate Tidal Volume Pressure   AC/PRVC    54 %  16 bmp         8     Peak airway pressure:     Minute ventilation:    No results for input(s): PH, PCO2, PO2, HCO3 in the last 72 hours. Recent Labs     01/31/23 1316 01/31/23 1627 01/31/23 2046 02/01/23  0249   WBC 17.8* 25.7*  --  7.8   HGB 9.4* 10.2* 8.9* 8.7*   HCT 29.0* 31.9* 27.9* 27.1*    232  --  134*   INR 1.6  --   --   --      Recent Labs     01/31/23  1316 01/31/23  1627 01/31/23 2046 02/01/23  0249   *  --   --  144*   K 3.8 4.1 4.6 4.4   *  --   --  116*   CO2 23  --   --  23   BUN 13  --   --  12   CREATININE 0.80  --   --  0.80   MG 2.9* 2.5* 2.3 2.4     No results for input(s): PROCAL, LACTATE in the last 72 hours. No results found for this or any previous visit. Assessment and Plan :  (Medical Decision Making)   Impression: 67 y.o. y/o female s/p CV surgery for CAD, multiple vessel; 1 Day Post-Op    Principal Problem:    CAD, multiple vessel  Plan: s/p CABG x5   Active Problems:    Heart failure with reduced ejection fraction (HCC)  Plan:     Atherosclerotic heart disease of native coronary artery with unstable angina pectoris ([address]  Plan:   -weaning from ventialtor- extubated  - hypoxemia  - no 4L NC,continue to wean ,IS, mobilize     More than 50% of the time documented was spent in face-to-face contact with the patient and in the care of the patient on the floor/unit where the patient is located.      Meri Robert MD

## 2023-02-01 NOTE — CARE COORDINATION
This CM met with pt spouse this day to complete assessment. Verified pt's PCP, insurance, emergency contact, and home address. He reports pt with no difficulty obtaining his medications in the community. Pt lives at home with with spouse with a short step to enter. Her home DME includes a raised toilet and shower chair. Prior to admission at baseline pt has been mostly independent with her ADLs including bathing, dressing, cooking, and ambulating. Discharge plan pending clinical progress. They are interested in referral to [facility] - referral to be made. We also reviewed the role and recommendation of home health - they are agreeable to that if indicated. No additional CM needs at this time. Will continue to monitor and update as needed. 01/31/23 1300   Service Assessment   Patient Orientation Alert and Oriented   Cognition Alert   History Provided By Significant Other   Primary Caregiver Self   Support Systems Spouse/Significant Other   PCP Verified by CM Yes   Last Visit to PCP Within last 3 months   Prior Functional Level Independent in ADLs/IADLs   Current Functional Level Other (see comment)  (TBD by clinical team)   Can patient return to prior living arrangement Yes   Ability to make needs known: Good   Family able to assist with home care needs: Yes   Social/Functional History   Lives With Spouse   Type of Home House   Discharge Planning   Type of Residence House   Living Arrangements Spouse/Significant Other   Services At/After Discharge   Confirm Follow Up Transport Family   Condition of Participation: Discharge Planning   The Plan for Transition of Care is related to the following treatment goals: Pending clinical progress   The Patient and/or Patient Representative was provided with a Choice of Provider? Patient;Patient Representative   Name of the Patient Representative who was provided with the Choice of Provider and agrees with the Discharge Plan?   Mr Michael Severs   The Patient and/Or Patient Representative agree with the Discharge Plan? Yes   Freedom of Choice list was provided with basic dialogue that supports the patient's individualized plan of care/goals, treatment preferences, and shares the quality data associated with the providers?   Yes

## 2023-02-01 NOTE — PROGRESS NOTES
ACUTE PHYSICAL THERAPY GOALS:   (Developed with and agreed upon by patient and/or caregiver. )  LTG:  (1.)Ms. Sheila Vallejo will move from supine to sit and sit to supine , scoot up and down, and roll side to side in bed with STAND BY ASSIST within 7 treatment day(s). (2.)Ms. Sheila Vallejo will transfer from bed to chair and chair to bed with STAND BY ASSIST using the least restrictive device within 7 treatment day(s). (3.)Ms. Sheila Vallejo will ambulate with STAND BY ASSIST for 100 feet with the least restrictive device within 7 treatment day(s). (4.)Ms. Sheila Vallejo will perform seated LE exercises for 15 minutes to improve strength and mobility within 7 days. PHYSICAL THERAPY: Daily Note PM   (Link to Caseload Tracking: PT Visit Days : 1  Time In/Out PT Charge Capture  Rehab Caseload Tracker  Orders    Jessica Regalado is a 67 y.o. 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) [I25.110]  Chest pain [R07.9]  Heart failure, systolic (HCC) [J16.63]  Abnormal stress test [R94.39]  CAD, multiple vessel [I25.10]  Procedure(s) (LRB):  CABG CORONARY ARTERY BYPASS,(CABGX5), LIMA, ENDOSCOPIC GREATER SAPHENOUS VEIN HARVEST LEFT LEG, TRANSESOPHOGEAL ECHOCARDIOGRAM (N/A)  1 Day Post-Op  Inpatient: Payor: Darya Webber / Plan: Darya Webber / Product Type: *No Product type* /     ASSESSMENT:     REHAB RECOMMENDATIONS:   Recommendation to date pending progress:  Setting:  Short-term Rehab    Equipment:    To Be Determined     ASSESSMENT:  Ms. Sheila Vallejo ***.      SUBJECTIVE:   Ms. Sheila Vallejo states, \"I feel better with some pain medicine\"     Social/Functional    OBJECTIVE:     PAIN: Darron Pride / O2: PRECAUTION / Unice Cancel / Rosa Lipa:   Pre Treatment:          Post Treatment: 0/10 Vitals        Oxygen on RA        RESTRICTIONS/PRECAUTIONS:        MOBILITY: I Mod I S SBA CGA Min Mod Max Total  NT x2 Comments:   Bed Mobility    Rolling [] [] [] [] [] [] [] [] [] [x] []    Supine to Sit [] [] [] [] [] [] [] [] [] [x] []    Scooting [] [] [] [] [] [] [] [] [] [x] []    Sit to Supine [] [] [] [] [] [] [] [] [] [x] []    Transfers    Sit to Stand [] [] [] [] [] [x] [] [] [] [] []    Bed to Chair [] [] [] [] [] [x] [] [] [] [] []    Stand to Sit [] [] [] [] [] [x] [] [] [] [] []     [] [] [] [] [] [] [] [] [] [] []    I=Independent, Mod I=Modified Independent, S=Supervision, SBA=Standby Assistance, CGA=Contact Guard Assistance,   Min=Minimal Assistance, Mod=Moderate Assistance, Max=Maximal Assistance, Total=Total Assistance, NT=Not Tested    BALANCE: Good Fair+ Fair Fair- Poor NT Comments   Sitting Static [] [] [] [] [] []    Sitting Dynamic [] [] [] [] [] []              Standing Static [] [] [x] [] [] []    Standing Dynamic [] [] [x] [] [] []      GAIT: I Mod I S SBA CGA Min Mod Max Total  NT x2 Comments:   Level of Assistance [] [] [] [] [] [x] [] [] [] [] []    Distance 20  feet    DME Cardiac Walker    Gait Quality Decreased step clearance, Decreased step length, Trunk sway increased, and Wide base of support    Weightbearing Status      Stairs      I=Independent, Mod I=Modified Independent, S=Supervision, SBA=Standby Assistance, CGA=Contact Guard Assistance,   Min=Minimal Assistance, Mod=Moderate Assistance, Max=Maximal Assistance, Total=Total Assistance, NT=Not Tested    PLAN:   FREQUENCY AND DURATION: BID for duration of hospital stay or until stated goals are met, whichever comes first.    TREATMENT:   TREATMENT:   Therapeutic Activity (15 Minutes): Therapeutic activity included Scooting, Transfer Training, Ambulation on level ground, Sitting balance , and Standing balance to improve functional Activity tolerance, Balance, Coordination, Mobility, and Strength.     TREATMENT GRID:  N/A    AFTER TREATMENT PRECAUTIONS: Call light within reach, Chair, Needs within reach, RN at bedside, and Visitors at bedside    INTERDISCIPLINARY COLLABORATION:  RN/ PCT and PT/ PTA    EDUCATION: Education Given To: Patient  Education Provided: Role of Therapy;Plan of Care  Education Method: Demonstration;Verbal  Barriers to Learning: Cognition  Education Outcome: Continued education needed    TIME IN/OUT:  Time In: 1320  Time Out: [place]  Minutes: 1613 [facility] Bernice Delgado PT

## 2023-02-02 ENCOUNTER — APPOINTMENT (OUTPATIENT)
Dept: GENERAL RADIOLOGY | Age: 73
DRG: 236 | End: 2023-02-02
Attending: THORACIC SURGERY (CARDIOTHORACIC VASCULAR SURGERY)
Payer: COMMERCIAL

## 2023-02-02 PROBLEM — Z99.11 ENCOUNTER FOR WEANING FROM VENTILATOR (HCC): Status: ACTIVE | Noted: 2023-02-02

## 2023-02-02 PROBLEM — J98.11 ATELECTASIS: Status: ACTIVE | Noted: 2023-02-02

## 2023-02-02 LAB
ANION GAP SERPL CALC-SCNC: 8 MMOL/L (ref 2–11)
BACTERIA SPEC CULT: NORMAL
BUN SERPL-MCNC: 11 MG/DL (ref 8–23)
CALCIUM SERPL-MCNC: 8.4 MG/DL (ref 8.3–10.4)
CHLORIDE SERPL-SCNC: 109 MMOL/L (ref 101–110)
CO2 SERPL-SCNC: 20 MMOL/L (ref 21–32)
CREAT SERPL-MCNC: 0.8 MG/DL (ref 0.6–1)
ERYTHROCYTE [DISTWIDTH] IN BLOOD BY AUTOMATED COUNT: 14.9 % (ref 11.9–14.6)
GLUCOSE BLD STRIP.AUTO-MCNC: 112 MG/DL (ref 65–100)
GLUCOSE BLD STRIP.AUTO-MCNC: 123 MG/DL (ref 65–100)
GLUCOSE SERPL-MCNC: 128 MG/DL (ref 65–100)
HCT VFR BLD AUTO: 27.5 % (ref 35.8–46.3)
HGB BLD-MCNC: 8.8 G/DL (ref 11.7–15.4)
MAGNESIUM SERPL-MCNC: 2.1 MG/DL (ref 1.8–2.4)
MCH RBC QN AUTO: 28.3 PG (ref 26.1–32.9)
MCHC RBC AUTO-ENTMCNC: 32 G/DL (ref 31.4–35)
MCV RBC AUTO: 88.4 FL (ref 82–102)
MM INDURATION, POC: 0 MM (ref 0–5)
NRBC # BLD: 0 K/UL (ref 0–0.2)
PLATELET # BLD AUTO: 137 K/UL (ref 150–450)
PMV BLD AUTO: 12 FL (ref 9.4–12.3)
POTASSIUM SERPL-SCNC: 4.3 MMOL/L (ref 3.5–5.1)
PPD, POC: NEGATIVE
RBC # BLD AUTO: 3.11 M/UL (ref 4.05–5.2)
SERVICE CMNT-IMP: ABNORMAL
SERVICE CMNT-IMP: ABNORMAL
SERVICE CMNT-IMP: NORMAL
SODIUM SERPL-SCNC: 137 MMOL/L (ref 133–143)
WBC # BLD AUTO: 10 K/UL (ref 4.3–11.1)

## 2023-02-02 PROCEDURE — 97535 SELF CARE MNGMENT TRAINING: CPT

## 2023-02-02 PROCEDURE — 97110 THERAPEUTIC EXERCISES: CPT

## 2023-02-02 PROCEDURE — 2580000003 HC RX 258: Performed by: PHYSICIAN ASSISTANT

## 2023-02-02 PROCEDURE — 99232 SBSQ HOSP IP/OBS MODERATE 35: CPT | Performed by: INTERNAL MEDICINE

## 2023-02-02 PROCEDURE — 71046 X-RAY EXAM CHEST 2 VIEWS: CPT

## 2023-02-02 PROCEDURE — 76937 US GUIDE VASCULAR ACCESS: CPT

## 2023-02-02 PROCEDURE — 99024 POSTOP FOLLOW-UP VISIT: CPT | Performed by: PHYSICIAN ASSISTANT

## 2023-02-02 PROCEDURE — 2140000001 HC CVICU INTERMEDIATE R&B

## 2023-02-02 PROCEDURE — 36415 COLL VENOUS BLD VENIPUNCTURE: CPT

## 2023-02-02 PROCEDURE — 80048 BASIC METABOLIC PNL TOTAL CA: CPT

## 2023-02-02 PROCEDURE — 85027 COMPLETE CBC AUTOMATED: CPT

## 2023-02-02 PROCEDURE — 83735 ASSAY OF MAGNESIUM: CPT

## 2023-02-02 PROCEDURE — 6370000000 HC RX 637 (ALT 250 FOR IP): Performed by: PHYSICIAN ASSISTANT

## 2023-02-02 PROCEDURE — 97165 OT EVAL LOW COMPLEX 30 MIN: CPT

## 2023-02-02 PROCEDURE — 82962 GLUCOSE BLOOD TEST: CPT

## 2023-02-02 PROCEDURE — 2500000003 HC RX 250 WO HCPCS: Performed by: PHYSICIAN ASSISTANT

## 2023-02-02 PROCEDURE — 97530 THERAPEUTIC ACTIVITIES: CPT

## 2023-02-02 PROCEDURE — 6370000000 HC RX 637 (ALT 250 FOR IP): Performed by: THORACIC SURGERY (CARDIOTHORACIC VASCULAR SURGERY)

## 2023-02-02 RX ORDER — CARVEDILOL 3.12 MG/1
3.12 TABLET ORAL 2 TIMES DAILY WITH MEALS
Status: DISCONTINUED | OUTPATIENT
Start: 2023-02-02 | End: 2023-02-06 | Stop reason: HOSPADM

## 2023-02-02 RX ADMIN — AMIODARONE HYDROCHLORIDE 200 MG: 200 TABLET ORAL at 21:10

## 2023-02-02 RX ADMIN — GABAPENTIN 300 MG: 300 CAPSULE ORAL at 09:48

## 2023-02-02 RX ADMIN — POTASSIUM CHLORIDE 10 MEQ: 750 TABLET, EXTENDED RELEASE ORAL at 09:49

## 2023-02-02 RX ADMIN — DULOXETINE HYDROCHLORIDE 60 MG: 60 CAPSULE, DELAYED RELEASE ORAL at 09:48

## 2023-02-02 RX ADMIN — AMIODARONE HYDROCHLORIDE 200 MG: 200 TABLET ORAL at 09:47

## 2023-02-02 RX ADMIN — TIZANIDINE 2 MG: 2 TABLET ORAL at 09:47

## 2023-02-02 RX ADMIN — SODIUM CHLORIDE, PRESERVATIVE FREE 10 ML: 5 INJECTION INTRAVENOUS at 21:11

## 2023-02-02 RX ADMIN — TIZANIDINE 2 MG: 2 TABLET ORAL at 13:22

## 2023-02-02 RX ADMIN — TRAMADOL HYDROCHLORIDE 50 MG: 50 TABLET ORAL at 06:00

## 2023-02-02 RX ADMIN — FAMOTIDINE 20 MG: 20 TABLET, FILM COATED ORAL at 09:49

## 2023-02-02 RX ADMIN — GABAPENTIN 300 MG: 300 CAPSULE ORAL at 21:10

## 2023-02-02 RX ADMIN — SENNOSIDES AND DOCUSATE SODIUM 1 TABLET: 8.6; 5 TABLET ORAL at 09:48

## 2023-02-02 RX ADMIN — FUROSEMIDE 40 MG: 20 TABLET ORAL at 09:47

## 2023-02-02 RX ADMIN — Medication 1 AMPULE: at 21:10

## 2023-02-02 RX ADMIN — TRAMADOL HYDROCHLORIDE 100 MG: 50 TABLET ORAL at 09:50

## 2023-02-02 RX ADMIN — GABAPENTIN 300 MG: 300 CAPSULE ORAL at 13:22

## 2023-02-02 RX ADMIN — CARVEDILOL 3.12 MG: 3.12 TABLET, FILM COATED ORAL at 17:53

## 2023-02-02 RX ADMIN — SODIUM CHLORIDE, PRESERVATIVE FREE 10 ML: 5 INJECTION INTRAVENOUS at 09:49

## 2023-02-02 RX ADMIN — ASPIRIN 81 MG: 81 TABLET, COATED ORAL at 09:50

## 2023-02-02 RX ADMIN — Medication 1 AMPULE: at 09:50

## 2023-02-02 RX ADMIN — ATORVASTATIN CALCIUM 80 MG: 80 TABLET, FILM COATED ORAL at 21:10

## 2023-02-02 RX ADMIN — FAMOTIDINE 20 MG: 20 TABLET, FILM COATED ORAL at 21:10

## 2023-02-02 RX ADMIN — SENNOSIDES AND DOCUSATE SODIUM 1 TABLET: 8.6; 5 TABLET ORAL at 21:10

## 2023-02-02 ASSESSMENT — PAIN SCALES - GENERAL
PAINLEVEL_OUTOF10: 5
PAINLEVEL_OUTOF10: 7
PAINLEVEL_OUTOF10: 3
PAINLEVEL_OUTOF10: 7
PAINLEVEL_OUTOF10: 8

## 2023-02-02 ASSESSMENT — PAIN DESCRIPTION - ORIENTATION: ORIENTATION: MID;RIGHT;LEFT

## 2023-02-02 ASSESSMENT — PAIN DESCRIPTION - LOCATION
LOCATION: BACK
LOCATION: CHEST;SHOULDER
LOCATION: INCISION
LOCATION: CHEST

## 2023-02-02 ASSESSMENT — PAIN - FUNCTIONAL ASSESSMENT: PAIN_FUNCTIONAL_ASSESSMENT: ACTIVITIES ARE NOT PREVENTED

## 2023-02-02 ASSESSMENT — PAIN DESCRIPTION - DESCRIPTORS
DESCRIPTORS: ACHING
DESCRIPTORS: ACHING
DESCRIPTORS: SORE

## 2023-02-02 NOTE — PROGRESS NOTES
Today's Date: 2/2/2023  Date of Admission: 1/31/2023    Chart Reviewed. Subjective:     Patient feels better today. She has more back pain than incisional pain. Appetite ok. Medications Reviewed. Objective:     Vitals:    02/02/23 0321 02/02/23 0600 02/02/23 0630 02/02/23 0707   BP: (!) 122/57   (!) 147/56   Pulse: 88   88   Resp: 18 18 18 19   Temp: 98.4 °F (36.9 °C)   98.2 °F (36.8 °C)   TempSrc: Oral   Oral   SpO2: 95%   96%   Weight:  215 lb 12.8 oz (97.9 kg)     Height:           Intake and Output  Current Shift: 02/02 0701 - 02/02 1900  In: 100 [P.O.:100]  Out: -    Last 3 Shifts: 01/31 1901 - 02/02 0700  In: 1445.8 [P.O.:820; I.V.:625.8]  Out: 1310 [Urine:1140]    Physical Exam:  General: Well Developed, Obese, No Acute Distress, Alert & Oriented x 3, Appropriate mood  Neck: supple, no JVD  Heart: S1S2 with RRR without murmurs or gallops  Lungs: mostly clear throughout auscultation bilaterally   Abd: soft, nontender, nondistended, with good bowel sounds  Ext: no edema bilaterally  Sternal incision: wound vac in place   Skin: warm and dry    LABS  Data Review:   Recent Labs     01/31/23  1316 01/31/23  1627 02/01/23  0249 02/02/23  0715   *  --  144* 137   K 3.8   < > 4.4 4.3   MG 2.9*   < > 2.4 2.1   BUN 13  --  12 11   WBC 17.8*   < > 7.8 10.0   HGB 9.4*   < > 8.7* 8.8*   HCT 29.0*   < > 27.1* 27.5*      < > 134* 137*   INR 1.6  --   --   --     < > = values in this interval not displayed. Estimated Creatinine Clearance: 71 mL/min (based on SCr of 0.8 mg/dL).       Assessment/Plan:      *S/P CABG x 5  On ASA, BB, statin, stable on room air, pacing wires removed, continue PT, OT     Active Hospital Problems    Hypoxemia  Resolved, stable on room air       Heart failure with reduced ejection fraction (HCC)  Continue Coreg      CAD, multiple vessel  S/p CABG      Atherosclerotic heart disease of native coronary artery with unstable angina pectoris ([address]  S/p CABG, continue medical therapy       Chest pain        Abnormal stress test  S/p CABG       Chronic back pain  Had surgery planned with Dr Perry Avilez that was postponed after abnormal cardiac evaluation       Landon Gupta.  JUAN Monteiro

## 2023-02-02 NOTE — PROGRESS NOTES
Physical Therapy Note:    Attempted to see patient this PM for physical therapy treatment  session. Patient states that she is feeling very tired from her shower. Will follow and re-attempt as schedule permits/patient available.  Thank you,    Lisa Camejo, [facility]     Rehab Caseload Tracker

## 2023-02-02 NOTE — PROGRESS NOTES
ACUTE PHYSICAL THERAPY GOALS:   (Developed with and agreed upon by patient and/or caregiver.)  1.)Ms. Bo Colunga will move from supine to sit and sit to supine , scoot up and down, and roll side to side in bed with STAND BY ASSIST within 7 treatment day(s). (2.)Ms. Bo Colunga will transfer from bed to chair and chair to bed with STAND BY ASSIST using the least restrictive device within 7 treatment day(s). (3.)Ms. Bo Colunga will ambulate with STAND BY ASSIST for 100 feet with the least restrictive device within 7 treatment day(s). (4.)Ms. Bo Colunga will perform seated LE exercises for 15 minutes to improve strength and mobility within 7 days. PHYSICAL THERAPY: Daily Note AM   (Link to Caseload Tracking: PT Visit Days : 1  Time In/Out PT Charge Capture  Rehab Caseload Tracker  Orders    Manjinder Emmanuel is a 67 y.o. 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) [I25.110]  Chest pain [R07.9]  Heart failure, systolic (HCC) [Y84.44]  Abnormal stress test [R94.39]  CAD, multiple vessel [I25.10]  Procedure(s) (LRB):  CABG CORONARY ARTERY BYPASS,(CABGX5), LIMA, ENDOSCOPIC GREATER SAPHENOUS VEIN HARVEST LEFT LEG, TRANSESOPHOGEAL ECHOCARDIOGRAM (N/A)  2 Days Post-Op  Inpatient: Payor: Isreal Rhoades / Plan: Isreal Rhoades / Product Type: *No Product type* /     ASSESSMENT:     REHAB RECOMMENDATIONS:   Recommendation to date pending progress:  Setting:  Short-term Rehab  Vs HHPT    Equipment:    To Be Determined     ASSESSMENT:  Ms. Bo Colunga is sitting in the recliner and agreeable to therapy. OT present. Mobility is slow as the patient does c/o back pain. Sit to stand with min assist to the walker. Gait with rolling walker x 100 feet with slow leandro and distance limited by back pain. Patient is returned to the room and assisted to the recliner and participates in some ADL'S with OT. Patient is returned to the recliner and left with needs within reach.   Good session and progress demonstrated. . Patients desire is to return to home with the assistance of her  but may need STR due to her increased back pain and mobility. Continue PT efforts.        SUBJECTIVE:   Ms. Nika Campbell states, \"Good morning\"     Social/Functional Lives With: Spouse  Type of Home: House  Bathroom Equipment: Shower chair (raised toilet seat)  Home Equipment: Rollator  Receives Help From: Family  ADL Assistance: Independent  Homemaking Assistance: Independent  Ambulation Assistance: Independent  Transfer Assistance: Independent  OBJECTIVE:     PAIN: Crow Skiff / O2: PRECAUTION / Dechivo Razof / Alfredo Solanoen:   Pre Treatment: 5/10         Post Treatment: 5/10 Vitals        Oxygen    Telemetry  and Wound Vac    RESTRICTIONS/PRECAUTIONS:  Restrictions/Precautions  Restrictions/Precautions: Surgical Protocols  Restrictions/Precautions: Surgical Protocols     MOBILITY: I Mod I S SBA CGA Min Mod Max Total  NT x2 Comments:   Bed Mobility    Rolling [] [] [] [] [] [] [] [] [] [x] []    Supine to Sit [] [] [] [] [] [] [] [] [] [x] []    Scooting [] [] [] [] [] [] [] [] [] [x] []    Sit to Supine [] [] [] [] [] [] [] [] [] [x] []    Transfers    Sit to Stand [] [] [] [] [] [x] [] [] [] [] [x]    Bed to Chair [] [] [] [] [] [] [] [] [] [x] []    Stand to Sit [] [] [] [] [] [x] [] [] [] [] [x]     [] [] [] [] [] [] [] [] [] [] []    I=Independent, Mod I=Modified Independent, S=Supervision, SBA=Standby Assistance, CGA=Contact Guard Assistance,   Min=Minimal Assistance, Mod=Moderate Assistance, Max=Maximal Assistance, Total=Total Assistance, NT=Not Tested    BALANCE: Good Fair+ Fair Fair- Poor NT Comments   Sitting Static [x] [] [] [] [] []    Sitting Dynamic [x] [] [] [] [] []              Standing Static [] [x] [] [] [] []    Standing Dynamic [] [] [x] [] [] []      GAIT: I Mod I S SBA CGA Min Mod Max Total  NT x2 Comments:   Level of Assistance [] [] [] [] [x] [] [] [] [] [] []    Distance 100  feet    DME Rolling Walker    Gait Quality Decreased leandro , Decreased step length, and Decreased stance    Weightbearing Status      Stairs      I=Independent, Mod I=Modified Independent, S=Supervision, SBA=Standby Assistance, CGA=Contact Guard Assistance,   Min=Minimal Assistance, Mod=Moderate Assistance, Max=Maximal Assistance, Total=Total Assistance, NT=Not Tested    PLAN:   FREQUENCY AND DURATION: BID for duration of hospital stay or until stated goals are met, whichever comes first.    TREATMENT:   TREATMENT:   Co-Treatment PT/OT necessary due to patient's decreased overall endurance/tolerance levels, as well as need for high level skilled assistance to complete functional transfers/mobility and functional tasks  Therapeutic Activity (11 Minutes): Therapeutic activity included Scooting, Ambulation on level ground, Sitting balance , and Standing balance to improve functional Activity tolerance, Balance, Coordination, Mobility, and Strength. Gait Training (10 Minutes): Gait training for 100 feet utilizing 5 [facility]. Patient required Verbal cueing to improve Activity Pacing.      TREATMENT GRID:     Date:   Date:   Date:     ACTIVITY/EXERCISE AM PM AM PM AM PM                                                                  B = bilateral; AA = active assistive; A = active; P = passive    AFTER TREATMENT PRECAUTIONS: Bed/Chair Locked, Call light within reach, Chair, Needs within reach, and RN notified    INTERDISCIPLINARY COLLABORATION:  RN/ PCT, PT/ PTA, and OT/ LONG    EDUCATION:  review POV and importance of mobility in the recovery process    TIME IN/OUT:  Time In: 0849  Time Out: 0910  Minutes: 21    Karin Burgos PTA

## 2023-02-02 NOTE — PROGRESS NOTES
Pulmonary CV progress Note: 2/2/2023        Nicol Palomares                                                     Admission Date: 1/31/2023     The patient's chart is reviewed and the patient is discussed with the staff. Background:     Patient is seen at the request of Aline Urrutia MD  for respiratory management status post cardiac surgery. Patient has a history of  CAD, multiple vessel, chronic back pain due to spondylosis and radiculopathy, PTSD with anxiety with medical procedures, never smoking. Currently is sedated in CV-ICU and orally intubated receiving  mechanical ventilation. We have been asked to see in the CV-ICU for mechanical ventilation management and weaning after CABG  x 5.    2 Day Post-Op    Subjective:     Now on room air with some atelectasis feeling well. Objective:   Blood pressure (!) 147/56, pulse 88, temperature 98.2 °F (36.8 °C), temperature source Oral, resp. rate 19, height 5' 4\" (1.626 m), weight 215 lb 12.8 oz (97.9 kg), SpO2 96 %. Intake/Output Summary (Last 24 hours) at 2/2/2023 1103  Last data filed at 2/2/2023 0830  Gross per 24 hour   Intake 620 ml   Output 300 ml   Net 320 ml       Physical Exam:          Constitutional:  Awake, calm  EENMT:  Sclera clear, pupils equal  Respiratory: clear  Cardiovascular:  RRR with no M,G,R;  Gastrointestinal:  soft; no bowel sounds present  Musculoskeletal:  warm with no cyanosis, no lower extremity edema. SKIN:  no jaundice or ecchymosis   Neurologic:  moving all extremities, fluent speech  Psychiatric:  calm    CXR:        LINES:  kaplan, swan ba, arterial line, chest tubes times 2 in epigastric area without air leak.     DRIPS:   Dose (mcg/kg/hr) Dexmedetomidine: 0 mcg/kg/hr  Dose (mcg/min) Epinephrine: 0 mcg/min  Dose (mcg/kg/min) Phenylephrine : *0 mcg (Anesthesia infusion automatically stopped by extension.)  Dose (mg/hr) Nicardipine: *0.2 mg  Dose (units/hr) Insulin : 0 Units/hr     CI:  CO (l/min): 5.2 l/min    Ventilator Settings  Mode FIO2 Rate Tidal Volume Pressure   AC/PRVC    54 %  16 bmp         8     Peak airway pressure:     Minute ventilation:    No results for input(s): PH, PCO2, PO2, HCO3 in the last 72 hours. Recent Labs     01/31/23  1316 01/31/23  1627 01/31/23 2046 02/01/23 0249 02/02/23  0715   WBC 17.8* 25.7*  --  7.8 10.0   HGB 9.4* 10.2* 8.9* 8.7* 8.8*   HCT 29.0* 31.9* 27.9* 27.1* 27.5*    232  --  134* 137*   INR 1.6  --   --   --   --        Recent Labs     01/31/23  1316 01/31/23  1627 01/31/23 2046 02/01/23  0249 02/02/23  0715   *  --   --  144* 137   K 3.8   < > 4.6 4.4 4.3   *  --   --  116* 109   CO2 23  --   --  23 20*   BUN 13  --   --  12 11   CREATININE 0.80  --   --  0.80 0.80   MG 2.9*   < > 2.3 2.4 2.1    < > = values in this interval not displayed. No results for input(s): PROCAL, LACTATE in the last 72 hours. No results found for this or any previous visit. Assessment and Plan :  (Medical Decision Making)   Impression: 67 y.o. y/o female s/p CV surgery for S/P CABG x 5; 2 Days [address] Problems    Hypoxemia  Continue incentive spirometry. *S/P CABG x 5      Heart failure with reduced ejection fraction [facility])  Medical management ongoing    CAD, multiple vessel      Atherosclerotic heart disease of native coronary artery with unstable angina pectoris (HCC)      Chest pain      Abnormal stress test    [facility] will be available as needed. More than 50% of the time documented was spent in face-to-face contact with the patient and in the care of the patient on the floor/unit where the patient is located.      Charlotte Ewing MD

## 2023-02-02 NOTE — PLAN OF CARE
Problem: Safety - Adult  Goal: Free from fall injury  2/1/2023 2125 by Mata Roche RN  Outcome: Progressing  Flowsheets (Taken 2/1/2023 1954)  Free From Fall Injury:   Instruct family/caregiver on patient safety   Based on caregiver fall risk screen, instruct family/caregiver to ask for assistance with transferring infant if caregiver noted to have fall risk factors  2/1/2023 1637 by Lukas Mcpherson RN  Outcome: Progressing     Problem: Pain  Goal: Verbalizes/displays adequate comfort level or baseline comfort level  2/1/2023 2125 by Mata Roche RN  Outcome: Progressing  2/1/2023 1637 by Lukas Mcpherson RN  Outcome: Progressing     Problem: Discharge Planning  Goal: Discharge to home or other facility with appropriate resources  2/1/2023 2125 by Mata Roche RN  Outcome: Progressing  Flowsheets (Taken 2/1/2023 1954)  Discharge to home or other facility with appropriate resources:   Identify barriers to discharge with patient and caregiver   Arrange for needed discharge resources and transportation as appropriate   Identify discharge learning needs (meds, wound care, etc)  2/1/2023 1637 by Lukas Mcpherson RN  Outcome: Progressing     Problem: Skin/Tissue Integrity  Goal: Absence of new skin breakdown  Description: 1. Monitor for areas of redness and/or skin breakdown  2. Assess vascular access sites hourly  3. Every 4-6 hours minimum:  Change oxygen saturation probe site  4. Every 4-6 hours:  If on nasal continuous positive airway pressure, respiratory therapy assess nares and determine need for appliance change or resting period.   2/1/2023 2125 by Mata Roche RN  Outcome: Progressing  2/1/2023 1637 by Lukas Mcpherson RN  Outcome: Progressing

## 2023-02-02 NOTE — PROGRESS NOTES
Pt lost IV access and has not received the Bicarb med yet. Attempted IV x2. Waiting on PICC team. Will also call cristobal.

## 2023-02-02 NOTE — PROGRESS NOTES
ACUTE OCCUPATIONAL THERAPY GOALS:   (Developed with and agreed upon by patient and/or caregiver.)  1. Pt will complete LB ADL set up only with AE as needed. 2. Pt will complete toileting supervision only with AE as needed. 3. Pt will complete UB ADL set up only. 4. Pt will tolerate 25 minutes of OT treatment requiring 1-2 breaks as needed. 5. Pt will complete grooming tasks while standing at sink supervision. 6. Pt will complete functional mobility via RW supervision. 7. Pt will tolerate BUE exercises to increase strength for safe, functional transfers, ADL participation. 8. Pt will verbalize and/or demonstrate understanding of sternal precautions during functional activity 100% of the time independently. Timeframe: 7 days     OCCUPATIONAL THERAPY Initial Assessment and Daily Note       OT Visit Days: 1    STERNAL PRECAUTIONS    Acknowledge Orders  Time  OT Charge Capture  Rehab Caseload Tracker      Nicol Palomares is a 67 y.o. 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) [I25.110]  Chest pain [R07.9]  Heart failure, systolic (HCC) [D20.02]  Abnormal stress test [R94.39]  CAD, multiple vessel [I25.10]  Procedure(s) (LRB):  CABG CORONARY ARTERY BYPASS,(CABGX5), LIMA, ENDOSCOPIC GREATER SAPHENOUS VEIN HARVEST LEFT LEG, TRANSESOPHOGEAL ECHOCARDIOGRAM (N/A)  2 Days Post-Op  Reason for Referral: Generalized Muscle Weakness (M62.81)  Inpatient: Payor: Leah Prow / Plan: Leah Prow / Product Type: *No Product type* /     ASSESSMENT:     REHAB RECOMMENDATIONS:   Recommendation to date pending progress:  Setting:  Short-term Rehab,  pending progress    Equipment:    Rolling Walker     ASSESSMENT:  Ms. Alfonso Larsen is a 67 y F admitted for CABG x5. Pt was received sitting up in chair on RA.  Pt required assistance for functional mobility and ADLs today due to generalized weakness, chronic back pain, sternal precautions, fatigue, slight SOB with extended activity. Pt tired after toileting, dressing, and ambulation, O2 at 90% on RA afterward. Pt states  could be home with her for one week after DC from hospital if she goes home. Verbal cueing for sternal precautions during toilet transfer. Pt has deficits in strength, balance, activity tolerance, and performing ADLs. Pt requires continued skilled OT services due to performing functionally below baseline. [facility] [place] [address] [address] AM-PAC 6 Clicks Daily Activity Inpatient Short Form:    AM-PAC Daily Activity Inpatient   How much help for putting on and taking off regular lower body clothing?: A Lot  How much help for Bathing?: A Lot  How much help for Toileting?: A Little  How much help for putting on and taking off regular upper body clothing?: A Little  How much help for taking care of personal grooming?: A Little  How much help for eating meals?: None  [facility] Inpatient Daily Activity Raw Score: 17  AM-PAC Inpatient ADL T-Scale Score : 37.26  ADL Inpatient CMS 0-100% Score: 50.11  ADL Inpatient CMS G-Code Modifier : CK           SUBJECTIVE:     Ms. Bo Colunga states, \"I want to be able to go home from here. \"     Social/Functional Lives With: Spouse  Type of Home: House  Bathroom Equipment: Shower chair (raised toilet seat)  Home Equipment: Rollator  Receives Help From: Family  ADL Assistance: Independent  Homemaking Assistance: Independent  Ambulation Assistance: Independent  Transfer Assistance: Independent    OBJECTIVE:     Jorge Mendez / Billie Desai / Meryle Slocumb: IV, Telemetry , and Wound Vac    RESTRICTIONS/PRECAUTIONS:  Restrictions/Precautions: Surgical Protocols  Position Activity Restriction  Sternal Precautions: No Pushing, No Pulling, 5# Lifting Restrictions    PAIN: VITALS / O2:   Pre Treatment:   Pain Assessment: 0-10  Pain Level: 7  Pain Location: Back  Non-Pharmaceutical Pain Intervention(s): Repositioned;Nurse notified (comment)      Post Treatment: same as above       Vitals Oxygen   See assessment         GROSS EVALUATION: INTACT IMPAIRED   (See Comments)   UE AROM [x] []   UE PROM [] []   Strength []  Generalized weakness     Posture / Balance [] Standing - Static:  (F/F+)  Standing - Dynamic: Fair   Sensation [x]     Coordination [x]       Tone [x]       Edema [x]    Activity Tolerance [] Patient limited by fatigue, Patient limited by pain     Hand Dominance R [x] L []      COGNITION/  PERCEPTION: INTACT IMPAIRED   (See Comments)   Orientation [x]     Vision [x]     Hearing [x]     Cognition  [x]     Perception []       MOBILITY: I Mod I S SBA CGA Min Mod Max Total  NT x2 Comments:   Bed Mobility    Rolling [] [] [] [] [] [] [] [] [] [] []    Supine to Sit [] [] [] [] [] [] [] [] [] [] []    Scooting [] [] [] [] [] [] [] [] [] [] []    Sit to Supine [] [] [] [] [] [] [] [] [] [] []    Transfers    Sit to Stand [] [] [] [] [] [x] [] [] [] [] []    Bed to Chair [] [] [] [] [] [] [] [] [] [] []    Stand to Sit [] [] [] [] [] [x] [] [] [] [] []    Tub/Shower [] [] [] [] [] [] [] [] [] [] []     Toilet [] [] [] [] [] [x] [] [] [] [] []      [] [] [] [] [] [] [] [] [] [] []    I=Independent, Mod I=Modified Independent, S=Supervision/Setup, SBA=Standby Assistance, CGA=Contact Guard Assistance, Min=Minimal Assistance, Mod=Moderate Assistance, Max=Maximal Assistance, Total=Total Assistance, NT=Not Tested    ACTIVITIES OF DAILY LIVING: I Mod I S SBA CGA Min Mod Max Total NT Comments   BASIC ADLs:              Upper Body Bathing  [] [] [] [] [] [] [] [] [] []    Lower Body Bathing [] [] [] [] [] [] [] [] [] []    Toileting [] [] [] [x] [] [] [] [] [] [] Regular toilet; urinate in hat   Upper Body Dressing [] [] [] [] [x] [] [] [] [] [] Standing donning house coat   Lower Body Dressing [] [] [] [] [] [] [] [] [] []    Feeding [] [] [] [] [] [] [] [] [] []    Grooming [] [] [] [] [] [] [] [] [] []    Personal Device Care [] [] [] [] [] [] [] [] [] []    Functional Mobility [] [] [] [] [x] [x] [] [] [] [] Chair > EOS > hallway > toilet > chair RW   I=Independent, Mod I=Modified Independent, S=Supervision/Setup, SBA=Standby Assistance, CGA=[facility], Min=Minimal Assistance, Mod=Moderate Assistance, Max=Maximal Assistance, Total=Total Assistance, NT=Not Tested    PLAN:   [facility]: 3 times/week for duration of hospital stay or until stated goals are met, whichever comes first.    PROBLEM LIST:   (Skilled intervention is medically necessary to address:)  Decreased ADL/Functional Activities  Decreased Activity Tolerance  Decreased Balance  Decreased Gait Ability  Decreased Safety Awareness  Decreased Strength  Decreased Transfer Abilities  Increased Pain   INTERVENTIONS PLANNED:  (Benefits and precautions of occupational therapy have been discussed with the patient.)  Self Care Training  Therapeutic Activity  Therapeutic Exercise/HEP  Neuromuscular Re-education  Gait Training  Manual Therapy  Education         TREATMENT:     EVALUATION: LOW COMPLEXITY: (Untimed Charge)    TREATMENT:   Co-Treatment PT/OT necessary due to patient's decreased overall endurance/tolerance levels, as well as need for high level skilled assistance to complete functional transfers/mobility and functional tasks  Self Care (15 minutes): Patient participated in toileting and upper body dressing ADLs in supported sitting and standing with minimal verbal and manual cueing to increase independence, decrease assistance required, increase activity tolerance, increase safety awareness, and maintain precautions. Patient also participated in functional mobility and adaptive equipment training to increase independence, decrease assistance required, increase activity tolerance, increase safety awareness, and maintain precautions.      TREATMENT GRID:  N/A    AFTER TREATMENT PRECAUTIONS: Bed/Chair Locked, Call light within reach, Chair, Needs within reach, and RN notified    INTERDISCIPLINARY COLLABORATION:  RN/ PCT, PT/ PTA, and OT/ LONG    EDUCATION:  Education Given To: Patient  Education Provided: Role of Therapy;Plan of Care;Precautions; Equipment  Education Outcome: Verbalized understanding    TOTAL TREATMENT DURATION AND TIME:  Time In: 7156  Time Out: 1442  Minutes: 89 Josee Munson OT

## 2023-02-03 PROBLEM — Z01.89 ENCOUNTER FOR REHABILITATION EVALUATION: Status: ACTIVE | Noted: 2023-02-03

## 2023-02-03 PROBLEM — Z78.9 DECREASED INDEPENDENCE WITH ACTIVITIES OF DAILY LIVING: Status: ACTIVE | Noted: 2023-02-03

## 2023-02-03 PROBLEM — R26.9 ABNORMALITY OF GAIT AND MOBILITY: Status: ACTIVE | Noted: 2023-02-03

## 2023-02-03 LAB
ABO + RH BLD: NORMAL
BLD PROD TYP BPU: NORMAL
BLOOD BANK DISPENSE STATUS: NORMAL
BLOOD GROUP ANTIBODIES SERPL: NORMAL
BPU ID: NORMAL
CROSSMATCH RESULT: NORMAL
MAGNESIUM SERPL-MCNC: 2 MG/DL (ref 1.8–2.4)
MM INDURATION, POC: 0 MM (ref 0–5)
POTASSIUM SERPL-SCNC: 3.7 MMOL/L (ref 3.5–5.1)
PPD, POC: NEGATIVE
SPECIMEN EXP DATE BLD: NORMAL
UNIT DIVISION: 0

## 2023-02-03 PROCEDURE — 84132 ASSAY OF SERUM POTASSIUM: CPT

## 2023-02-03 PROCEDURE — 6370000000 HC RX 637 (ALT 250 FOR IP): Performed by: PHYSICIAN ASSISTANT

## 2023-02-03 PROCEDURE — 2580000003 HC RX 258: Performed by: PHYSICIAN ASSISTANT

## 2023-02-03 PROCEDURE — 36415 COLL VENOUS BLD VENIPUNCTURE: CPT

## 2023-02-03 PROCEDURE — 97535 SELF CARE MNGMENT TRAINING: CPT

## 2023-02-03 PROCEDURE — 2140000001 HC CVICU INTERMEDIATE R&B

## 2023-02-03 PROCEDURE — 83735 ASSAY OF MAGNESIUM: CPT

## 2023-02-03 PROCEDURE — 6370000000 HC RX 637 (ALT 250 FOR IP): Performed by: THORACIC SURGERY (CARDIOTHORACIC VASCULAR SURGERY)

## 2023-02-03 PROCEDURE — 97530 THERAPEUTIC ACTIVITIES: CPT

## 2023-02-03 PROCEDURE — 99024 POSTOP FOLLOW-UP VISIT: CPT | Performed by: PHYSICIAN ASSISTANT

## 2023-02-03 PROCEDURE — 97110 THERAPEUTIC EXERCISES: CPT

## 2023-02-03 RX ORDER — TIZANIDINE 2 MG/1
2 TABLET ORAL 3 TIMES DAILY PRN
Status: DISCONTINUED | OUTPATIENT
Start: 2023-02-03 | End: 2023-02-06 | Stop reason: HOSPADM

## 2023-02-03 RX ORDER — SENNA AND DOCUSATE SODIUM 50; 8.6 MG/1; MG/1
2 TABLET, FILM COATED ORAL 2 TIMES DAILY
Status: DISCONTINUED | OUTPATIENT
Start: 2023-02-03 | End: 2023-02-05

## 2023-02-03 RX ADMIN — Medication 1 AMPULE: at 09:51

## 2023-02-03 RX ADMIN — GABAPENTIN 300 MG: 300 CAPSULE ORAL at 15:13

## 2023-02-03 RX ADMIN — ATORVASTATIN CALCIUM 80 MG: 80 TABLET, FILM COATED ORAL at 20:25

## 2023-02-03 RX ADMIN — TRAMADOL HYDROCHLORIDE 50 MG: 50 TABLET ORAL at 15:13

## 2023-02-03 RX ADMIN — CARVEDILOL 3.12 MG: 3.12 TABLET, FILM COATED ORAL at 09:51

## 2023-02-03 RX ADMIN — AMIODARONE HYDROCHLORIDE 200 MG: 200 TABLET ORAL at 20:25

## 2023-02-03 RX ADMIN — POTASSIUM CHLORIDE 40 MEQ: 1500 TABLET, EXTENDED RELEASE ORAL at 17:49

## 2023-02-03 RX ADMIN — AMIODARONE HYDROCHLORIDE 200 MG: 200 TABLET ORAL at 09:43

## 2023-02-03 RX ADMIN — FAMOTIDINE 20 MG: 20 TABLET, FILM COATED ORAL at 09:43

## 2023-02-03 RX ADMIN — ASPIRIN 81 MG: 81 TABLET, COATED ORAL at 09:44

## 2023-02-03 RX ADMIN — SODIUM CHLORIDE, PRESERVATIVE FREE 5 ML: 5 INJECTION INTRAVENOUS at 20:00

## 2023-02-03 RX ADMIN — SENNOSIDES AND DOCUSATE SODIUM 2 TABLET: 8.6; 5 TABLET ORAL at 09:42

## 2023-02-03 RX ADMIN — SODIUM CHLORIDE, PRESERVATIVE FREE 10 ML: 5 INJECTION INTRAVENOUS at 09:51

## 2023-02-03 RX ADMIN — CARVEDILOL 3.12 MG: 3.12 TABLET, FILM COATED ORAL at 17:46

## 2023-02-03 RX ADMIN — DULOXETINE HYDROCHLORIDE 60 MG: 60 CAPSULE, DELAYED RELEASE ORAL at 09:42

## 2023-02-03 RX ADMIN — TRAMADOL HYDROCHLORIDE 50 MG: 50 TABLET ORAL at 09:44

## 2023-02-03 RX ADMIN — FAMOTIDINE 20 MG: 20 TABLET, FILM COATED ORAL at 20:26

## 2023-02-03 RX ADMIN — GABAPENTIN 300 MG: 300 CAPSULE ORAL at 20:34

## 2023-02-03 RX ADMIN — Medication 1 AMPULE: at 20:25

## 2023-02-03 RX ADMIN — ACETAMINOPHEN 650 MG: 325 TABLET ORAL at 09:42

## 2023-02-03 RX ADMIN — FUROSEMIDE 40 MG: 20 TABLET ORAL at 09:44

## 2023-02-03 RX ADMIN — GABAPENTIN 300 MG: 300 CAPSULE ORAL at 09:43

## 2023-02-03 RX ADMIN — POTASSIUM CHLORIDE 10 MEQ: 750 TABLET, EXTENDED RELEASE ORAL at 09:43

## 2023-02-03 ASSESSMENT — PAIN SCALES - GENERAL
PAINLEVEL_OUTOF10: 0
PAINLEVEL_OUTOF10: 2
PAINLEVEL_OUTOF10: 1
PAINLEVEL_OUTOF10: 1
PAINLEVEL_OUTOF10: 6
PAINLEVEL_OUTOF10: 0
PAINLEVEL_OUTOF10: 0
PAINLEVEL_OUTOF10: 5

## 2023-02-03 ASSESSMENT — PAIN DESCRIPTION - ORIENTATION
ORIENTATION: LOWER
ORIENTATION: LOWER

## 2023-02-03 ASSESSMENT — PAIN DESCRIPTION - DESCRIPTORS
DESCRIPTORS: ACHING
DESCRIPTORS: ACHING

## 2023-02-03 ASSESSMENT — PAIN DESCRIPTION - LOCATION
LOCATION: BACK
LOCATION: BACK

## 2023-02-03 NOTE — CARE COORDINATION
This CM met with pt and spouse this day at bedside to discuss discharge planning. Explained that [facility] did offer a rehab bed to pt and prior authorization was initiated. Pt and spouse remain agreeable to seeing if insurance will approve rehab but are debating upon returning home with home health. We reviewed agencies and referral made to [facility] this day. Per clinical provider, anticipated discharge for pt will be Monday 2/6/23 - pt will either discharge to [facility] (per insurance authorization and if pt is agreeable) or will return home with spouse and home health. No additional CM needs at this time. Will continue to monitor and update as needed.

## 2023-02-03 NOTE — PROGRESS NOTES
Today's Date: 2/3/2023  Date of Admission: 1/31/2023    Chart Reviewed. Subjective:     Patient feels better. She feels stronger today. Appetite ok. No BM yet. Medications Reviewed. Objective:     Vitals:    02/02/23 2304 02/03/23 0357 02/03/23 0600 02/03/23 0709   BP: 130/78 129/60  128/62   Pulse: 87 80  77   Resp: 17 18  17   Temp: 98.2 °F (36.8 °C) 98.8 °F (37.1 °C)  97.9 °F (36.6 °C)   TempSrc: Oral Oral  Oral   SpO2: 96% 95%  95%   Weight:   215 lb 9.6 oz (97.8 kg)    Height:           Intake and Output  Current Shift: No intake/output data recorded. Last 3 Shifts: 02/01 1901 - 02/03 0700  In: 300 [P.O.:300]  Out: 650 [Urine:650]    Physical Exam:  General: Well Developed, Obese, No Acute Distress, Alert & Oriented x 3, Appropriate mood  Neck: supple, no JVD  Heart: S1S2 with RRR without murmurs or gallops  Lungs: Clear throughout auscultation bilaterally without adventitious sounds  Abd: soft, nontender, nondistended, with good bowel sounds  Ext: no edema bilaterally  Sternal incision: wound vac in place  Skin: warm and dry    LABS  Data Review:   Recent Labs     01/31/23  1316 01/31/23  1627 02/01/23  0249 02/02/23  0715 02/03/23  0608   *  --  144* 137  --    K 3.8   < > 4.4 4.3 3.7   MG 2.9*   < > 2.4 2.1 2.0   BUN 13  --  12 11  --    WBC 17.8*   < > 7.8 10.0  --    HGB 9.4*   < > 8.7* 8.8*  --    HCT 29.0*   < > 27.1* 27.5*  --       < > 134* 137*  --    INR 1.6  --   --   --   --     < > = values in this interval not displayed. Estimated Creatinine Clearance: 71 mL/min (based on SCr of 0.8 mg/dL).       Assessment/Plan:     *S/P CABG x 5  On ASA, BB, statin, stable on room air, pacing wires removed, continue PT, OT, no BM yet, not passing gas either but good bowel sounds     Active Hospital Problems    Hypoxemia  Resolved, stable on room air       Heart failure with reduced ejection fraction (HCC)  Continue Coreg       CAD, multiple vessel  S/p CABG Atherosclerotic heart disease of native coronary artery with unstable angina pectoris [facility])  S/p CABG, continue medical therapy        Chest pain          Abnormal stress test  S/p CABG        Chronic back pain  Had surgery planned with Dr Payam Steel that was postponed after abnormal cardiac evaluation     Dispo  Rehab discussed, pt prefers to go home with [place].  JUAN Monteiro

## 2023-02-03 NOTE — PROGRESS NOTES
Cardiac Rehab: Spoke with patient regarding referral to cardiac rehab. Patient meets admission criteria based on CABGx5 (1/31/23). Written information about Cardiac Rehab given and reviewed with patient. Discussed lifestyle modifications to promote cardiac wellness. Patient indicated that she wants to participate in the cardiac rehab program at the [address] [place] [address]. We will forward her referral to the [address] [place] [address] as requested. Her Cardiologist is Dr. Malika Mclean.       Thank you,  VIRGIL MontanoN, RN  Cardiopulmonary Rehabilitation Nurse Liaison  [facility]

## 2023-02-03 NOTE — PLAN OF CARE
Problem: Safety - Adult  Goal: Free from fall injury  Outcome: Progressing  Flowsheets (Taken 2/2/2023 2000)  Free From Fall Injury:   Instruct family/caregiver on patient safety   Based on caregiver fall risk screen, instruct family/caregiver to ask for assistance with transferring infant if caregiver noted to have fall risk factors     Problem: Pain  Goal: Verbalizes/displays adequate comfort level or baseline comfort level  Outcome: Progressing     Problem: Discharge Planning  Goal: Discharge to home or other facility with appropriate resources  Outcome: Progressing  Flowsheets (Taken 2/2/2023 2000)  Discharge to home or other facility with appropriate resources:   Identify barriers to discharge with patient and caregiver   Arrange for needed discharge resources and transportation as appropriate   Identify discharge learning needs (meds, wound care, etc)     Problem: Skin/Tissue Integrity  Goal: Absence of new skin breakdown  Description: 1. Monitor for areas of redness and/or skin breakdown  2. Assess vascular access sites hourly  3. Every 4-6 hours minimum:  Change oxygen saturation probe site  4. Every 4-6 hours:  If on nasal continuous positive airway pressure, respiratory therapy assess nares and determine need for appliance change or resting period.   Outcome: Progressing

## 2023-02-03 NOTE — CONSULTS
[facility], [address] [facility]  Tel: [phone]      Physical Medicine & Rehab Consult     Admit Date: 1/31/2023  Referring Provider: Koby Yanez PA-C / CT Surgery    Medical Decision Making / Plan / Recommend: Medical chart reviewed. Hernandez Altamirano is a 67 y.o. WF whom we have been requested to evaluate for consideration for inpatient rehabilitation after CABG. Consult acknowledged, EMR reviewed in conjunction with [facility] admissions coordinators. Briefly, patient with PMH including chronic back pain from spondylosis and radiculopathy scheduled for surgical intervention by Dr. Marysol Crump was found to have unstable mvCAD on pre-op evaluation. She underwent 5v CABG 1/31/2023 William Deshpande MD) with unremarkable post-op course. PLOF: reportedly independent at baseline for ADLs, iADLs; recently modified independent for mobility with Rollator use and limited gait distance due to back pain  Per PT (2/2, POD #2): min A x 2 for transfers, ambulated 100' CGA with RW  Per OT (2/2): CGA for UB dressing, SBA for toileting    Patient did quite well POD #2 considering chronic LBP and postponed surgical intervention and will likely continue to progress rapidly. At present, she is actually too high level functioning to qualify for IRC-intensity therapy, and we suspect with time required to authorize insurance, patient will have progressed to needing only HH or outpatient therapies. Thank you for inviting us to participate in this patient's care. Please notify us of any changes; otherwise, we will sign off. Sarai Ingram PA-C  Physician Assistant with [address] [place] [address] Program        Time spent reviewing EMR and medical decision-making was 15 minutes.

## 2023-02-03 NOTE — PROGRESS NOTES
ACUTE OCCUPATIONAL THERAPY GOALS:   (Developed with and agreed upon by patient and/or caregiver.)  1. Pt will complete LB ADL set up only with AE as needed. 2. Pt will complete toileting supervision only with AE as needed. 3. Pt will complete UB ADL set up only. 4. Pt will tolerate 25 minutes of OT treatment requiring 1-2 breaks as needed. 5. Pt will complete grooming tasks while standing at sink supervision. 6. Pt will complete functional mobility via RW supervision. 7. Pt will tolerate BUE exercises to increase strength for safe, functional transfers, ADL participation. 8. Pt will verbalize and/or demonstrate understanding of sternal precautions during functional activity 100% of the time independently. Timeframe: 7 visits    OCCUPATIONAL THERAPY: Daily Note PM   OT Visit Days: 2   Time In/Out  OT Charge Capture  Rehab Caseload Tracker  OT Orders    Nicol Palomares is a 67 y.o. 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) [I25.110]  Chest pain [R07.9]  Heart failure, systolic (HCC) [A11.13]  Abnormal stress test [R94.39]  CAD, multiple vessel [I25.10]  Procedure(s) (LRB):  CABG CORONARY ARTERY BYPASS,(CABGX5), LIMA, ENDOSCOPIC GREATER SAPHENOUS VEIN HARVEST LEFT LEG, TRANSESOPHOGEAL ECHOCARDIOGRAM (N/A)  3 Days Post-Op  Inpatient: Payor: Leah Garza / Plan: Leah Prow / Product Type: *No Product type* /     ASSESSMENT:     REHAB RECOMMENDATIONS: CURRENT LEVEL OF FUNCTION:  (Most Recently Demonstrated)   Recommendation to date pending progress:  Setting:  Home Health Therapy > cardiac rehab    Equipment:    Rolling Walker Bathing:  Stand by Assist  Dressing:  Supervision/Setup  Feeding/Grooming:  Not Tested  Toileting:  Not Tested  Functional Mobility:  Contact Guard Assist     ASSESSMENT:  Ms. Alfonso Larsen was received sitting on shower chair with PCT taking/finishing shower.  Pt required assistance for functional mobility and ADLs today due to fatigue with therapeutic activity, slight unsteadiness. Pt's O2 was 100% on RA for shower. Pt's states her pain is manageable. Pt is progressing with functional mobility and ADLs. Continue POC. SUBJECTIVE:     Ms. Michael Severs states, \"I'm getting better everyday. \"     Social/Functional Lives With: Spouse  Type of Home: House  Bathroom Equipment: Shower chair (raised toilet seat)  Home Equipment: Rollator  Receives Help From: Family  ADL Assistance: Independent  Homemaking Assistance: Independent  Ambulation Assistance: Independent  Transfer Assistance: Independent    OBJECTIVE:     Daria Gould / Cesar Almodovar / Svetlana Jere: IV and Wound Vac    RESTRICTIONS/PRECAUTIONS:  Restrictions/Precautions  Restrictions/Precautions: Surgical Protocols  Position Activity Restriction  Sternal Precautions: No Pushing, No Pulling, 5# Lifting Restrictions        PAIN: VITALS / O2:   Pre Treatment:   Pain Assessment:  (non-quantified, manageable incisional pain)        Post Treatment: same as above Vitals          Oxygen        MOBILITY: I Mod I S SBA CGA Min Mod Max Total  NT x2 Comments:   Bed Mobility    Rolling [] [] [] [] [] [] [] [] [] [] []    Supine to Sit [] [] [] [] [] [] [] [] [] [] []    Scooting [] [] [] [] [] [] [] [] [] [] []    Sit to Supine [] [] [] [] [] [] [] [] [] [] []    Transfers    Sit to Stand [] [] [] [x] [] [] [] [] [] [] []    Bed to Chair [] [] [] [] [] [] [] [] [] [] []    Stand to Sit [] [] [] [x] [] [] [] [] [] [] []    Tub/Shower [] [] [] [] [] [] [] [] [] [] []     Toilet [] [] [] [] [] [] [] [] [] [] []      [] [] [] [] [] [] [] [] [] [] []    I=Independent, Mod I=Modified Independent, S=Supervision/Setup, SBA=Standby Assistance, CGA=Contact Guard Assistance, Min=Minimal Assistance, Mod=Moderate Assistance, Max=Maximal Assistance, Total=Total Assistance, NT=Not Tested    ACTIVITIES OF DAILY LIVING: I Mod I S SBA CGA Min Mod Max Total NT Comments   BASIC ADLs:              Upper Body Bathing [] [] [] [x] [] [] [] [] [] [] Seated HHS   Lower Body Bathing [] [] [] [x] [] [] [] [] [] [] Standing Grab bar; [facility]   Toileting [] [] [] [] [] [] [] [] [] []    Upper Body Dressing [] [] [x] [] [] [] [] [] [] [] Seated gown   Lower Body Dressing [] [] [] [] [] [] [] [x] [] [] Seated compression sock   Feeding [] [] [] [] [] [] [] [] [] []    Grooming [] [] [] [] [] [] [] [] [] []    Personal Device Care [] [] [] [] [] [] [] [] [] []    Functional Mobility [] [] [] [x] [x] [] [] [] [] [] Grab bar; shower > chair   I=Independent, Mod I=Modified Independent, S=Supervision/Setup, SBA=Standby Assistance, CGA=Contact Guard Assistance, Min=Minimal Assistance, Mod=Moderate Assistance, Max=Maximal Assistance, Total=Total Assistance, NT=Not Tested    BALANCE: Good Fair+ Fair Fair- Poor NT Comments   Sitting Static [x] [] [] [] [] []    Sitting Dynamic [x] [x] [] [] [] []              Standing Static [x] [x] [] [] [] []    Standing Dynamic [] [x] [] [] [] []        PLAN:     FREQUENCY/DURATION   OT Plan of Care: 3 times/week for duration of hospital stay or until stated goals are met, whichever comes first.    TREATMENT:     TREATMENT:   Self Care (11 minutes): Patient participated in upper body bathing, lower body bathing, upper body dressing, and lower body dressing ADLs in supported sitting and standing with no manual cueing to increase independence, decrease assistance required, increase activity tolerance, and increase safety awareness. Patient also participated in functional mobility training to increase independence, decrease assistance required, increase activity tolerance, increase safety awareness, and maintain precautions.      TREATMENT GRID:  N/A    AFTER TREATMENT PRECAUTIONS: Bed/Chair Locked, Call light within reach, Chair, Needs within reach, RN notified, and Visitors at bedside    INTERDISCIPLINARY COLLABORATION:  RN/ PCT, OT/ LONG, and RN Case Manager/      EDUCATION:  Education Given To: Patient  Education Provided: Role of Therapy;Plan of Care;Precautions; Fall Prevention Strategies  Education Outcome: Verbalized understanding    TOTAL TREATMENT DURATION AND TIME:  Time In: 1525  Time Out: [facility]  Minutes: 181 Carlotta Waller, OT

## 2023-02-03 NOTE — PROGRESS NOTES
ACUTE PHYSICAL THERAPY GOALS:   (Developed with and agreed upon by patient and/or caregiver.)  1.)Ms. Andrei Hernandez will move from supine to sit and sit to supine , scoot up and down, and roll side to side in bed with STAND BY ASSIST within 7 treatment day(s). (2.)Ms. Andrei Hernandez will transfer from bed to chair and chair to bed with STAND BY ASSIST using the least restrictive device within 7 treatment day(s). (3.)Ms. Andrei Hernandez will ambulate with STAND BY ASSIST for 100 feet with the least restrictive device within 7 treatment day(s). (4.)Ms. Andrei Hernandez will perform seated LE exercises for 15 minutes to improve strength and mobility within 7 days. PHYSICAL THERAPY: Daily Note PM   (Link to Caseload Tracking: PT Visit Days : 3  Time In/Out PT Charge Capture  Rehab Caseload Tracker  Orders    Mariaa Mane is a 67 y.o. 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 Andrei Hernandez is sitting in the recliner and agreeable to therapy.  at bedside. Mobility is slow as the patient does c/o back pain. Sit to stand with min assist to the walker. Gait with rolling walker x 100 feet with slow leandro  with one sitting rest break. Patient is returned to the recliner and after a rest break the patient was instructed in therapeutic exercises ,   tolerated well. Left with needs within reach.   Good session and progress demonstrated. . Patients desire is to return to home with the assistance of her  but may need STR due to her increased back pain and mobility. Continue PT efforts. PM note: Patient agreeable. Gait training and exercises continued. Patient is left in the recliner with needs within reach,  at bedside.        SUBJECTIVE:   Ms. Gennaro Segura states, \"Hello\"     Social/Functional Lives With: Spouse  Type of Home: House  Bathroom Equipment: Shower chair (raised toilet seat)  Home Equipment: Rollator  Receives Help From: Family  ADL Assistance: Independent  Homemaking Assistance: Independent  Ambulation Assistance: Independent  Transfer Assistance: Independent  OBJECTIVE:     PAIN: [place] / O2: PRECAUTION / Manette Bath / Gary Katos:   Pre Treatment: not rated         Post Treatment: not rated Vitals        Oxygen    Telemetry  and Wound Vac    RESTRICTIONS/PRECAUTIONS:  Restrictions/Precautions  Restrictions/Precautions: Surgical Protocols  Restrictions/Precautions: Surgical Protocols     MOBILITY: I Mod I S SBA CGA Min Mod Max Total  NT x2 Comments:   Bed Mobility    Rolling [] [] [] [] [] [] [] [] [] [x] []    Supine to Sit [] [] [] [] [] [] [] [] [] [x] []    Scooting [] [] [] [] [] [] [] [] [] [x] []    Sit to Supine [] [] [] [] [] [] [] [] [] [x] []    Transfers    Sit to Stand [] [] [] [] [] [x] [] [] [] [] [x]    Bed to Chair [] [] [] [] [] [] [] [] [] [x] []    Stand to Sit [] [] [] [] [] [x] [] [] [] [] [x]     [] [] [] [] [] [] [] [] [] [] []    I=Independent, Mod I=Modified Independent, S=Supervision, SBA=Standby Assistance, CGA=Contact Guard Assistance,   Min=Minimal Assistance, Mod=Moderate Assistance, Max=Maximal Assistance, Total=Total Assistance, NT=Not Tested    BALANCE: Good Fair+ Fair Fair- Poor NT Comments   Sitting Static [x] [] [] [] [] []    Sitting Dynamic [x] [] [] [] [] []              Standing Static [] [x] [] [] [] []    Standing Dynamic [] [] [x] [] [] []      GAIT: I Mod I S SBA CGA Min Mod Max Total  NT x2 Comments:   Level of Assistance [] [] [] [] [x] [] [] [] [] [] []    Distance 100  feet    DME Rolling Walker    Gait Quality Decreased leandro , Decreased step length, and Decreased stance    Weightbearing Status      Stairs      I=Independent, Mod I=Modified Independent, S=Supervision, SBA=Standby Assistance, CGA=Contact Guard Assistance,   Min=Minimal Assistance, Mod=Moderate Assistance, Max=Maximal Assistance, Total=Total Assistance, NT=Not Tested    PLAN:   FREQUENCY AND DURATION: BID for duration of hospital stay or until stated goals are met, whichever comes first.    TREATMENT:   TREATMENT:   Therapeutic Activity (10 Minutes): Therapeutic activity included Scooting, Ambulation on level ground, Sitting balance , and Standing balance to improve functional Activity tolerance, Balance, Coordination, Mobility, and Strength. Therapeutic Exercise (9 Minutes): Therapeutic exercises noted below to improve functional activity tolerance, AROM, strength, and mobility.      TREATMENT GRID:     Date:  2/3/23 Date:   Date:     ACTIVITY/EXERCISE AM PM AM PM AM PM   LAQ 10 10       Shoulder shrugs 10 10       Gluteal sets 10 10       marching 10 10       Ankle pumps 10 10       abduction 10 10                B = bilateral; AA = active assistive; A = active; P = passive    AFTER TREATMENT PRECAUTIONS: Bed/Chair Locked, Call light within reach, Chair, Needs within reach, RN notified, and Visitors at bedside    INTERDISCIPLINARY COLLABORATION:  RN/ PCT and PT/ PTA    EDUCATION:  review POV and importance of mobility in the recovery process    TIME IN/OUT:  Time In: 1409  Time Out: [address]  Minutes: 23    Karin Burgos PTA

## 2023-02-03 NOTE — DISCHARGE SUMMARY
Discharge Summary     Patient ID:  Jackelin Baldwin  [id number]  52 y.o.  1950    Admit date: 1/31/2023    Discharge date:  2/6/2023    Admitting Physician: Bunny Dyson MD     Discharge Physician: Azeb Dwyer NP/Dr. Wise Charlotte    Admission Diagnoses: Atherosclerotic heart disease of native coronary artery with unstable angina pectoris [facility]) [I25.110]  Chest pain [R07.9]  Heart failure, systolic (HCC) [Y35.42]  Abnormal stress test [R94.39]  CAD, multiple vessel [I25.10]    Discharge Diagnoses:   Patient Active Problem List    Diagnosis Date Noted    Encounter for rehabilitation evaluation 02/03/2023    Abnormality of gait and mobility 02/03/2023    Decreased independence with activities of daily living 02/03/2023    Encounter for weaning from ventilator ([facility] [address] 02/02/2023    Atelectasis 02/02/2023    Hypoxia 02/01/2023    S/P CABG x 5 01/31/2023    CAD, multiple vessel 01/25/2023    Heart failure with reduced ejection fraction ([address] 01/25/2023    Atherosclerotic heart disease of native coronary artery with unstable angina pectoris ([address] 01/25/2023    Chest pain 01/25/2023    Abnormal stress test 01/25/2023    Syncope 10/11/2020    Closed fracture of left proximal humerus 10/10/2020    Leukocytosis 10/10/2020    Seizure ([address] 10/10/2020    S/P total knee arthroplasty 10/02/2015    Osteoarthritis 10/01/2015    Status post hip replacement 04/09/2014       Procedures this admission:   Coronary Artery Bypass Graft Surgery  Consults: [facility] DR APSCUAL ARCE Course: Patient presented for elective CABG surgery. She had a planned back surgery with Dr Marisa Zarate. Her pre-op EKG was abnormal. She was referred for cardiac evaluation. She was seen at [address] [facility]. Her echocardiogram showed a reduced LV EF. Her nuclear stress test was abnormal. She underwent cardiac catheterization that showed severe multivessel disease with subtotal occlusion of the Lcx.  She was seen in our office in consultation and CABG surgery was planned. She underwent CABG X 5 with LIMA to the LAD, reverse SVG to the diagonal, reverse SVG to the ramus, reverse SVG to the OM, and reverse SVG to the distal RCA on 1/31/23. She did well post operatively and was transferred to [facility] on POD 1. She was weaned off of O2. She progressed slowly with PT given her longstanding chronic back pain and OA. Inpatient rehab was recommended. The morning of 02/06/2023, patient was feeling well and ambulating without any symptoms. Patient was determined stable and ready for discharge. Patient was instructed on the importance of medication compliance and outpatient follow up. For maximized medical therapy for CAD, patient will continue ASA, BB, and statin as well. She is not on an ACE or ARB and her blood pressure has been well controlled. She is hypertensive on the morning of discharge so home health nursing will reassess this and medication can be started if persistent. I am going to continue Lasix for 3 days at discharge as she still has leg swelling. She also has a sternal wound vac in place and home health will follow her for this. The patient will have close transitional care follow up with [facility] on Feb 13 at 8 AM.   The patient will follow up with Dr. Nicole Santamaria on March 1st at 2pm and she has been referred to cardiac rehab. DISPOSITION: The patient is being discharged home with home health services in stable condition on a low saturated fat, low cholesterol and low salt diet. The patient is instructed to advance activities as tolerated to the limit of fatigue or shortness of breath. The patient is instructed to avoid all heavy lifting or activities that strain the chest or upper arm muscles. Strenuous activity should be avoided. The patient is instructed to watch for signs of infection which include: increasing area of redness, fever or purulent drainage at the incision site.  The patient is instructed to call the office or return to the ER for immediate evaluation for any shortness of breath or chest pain not relieved by NTG.     Discharge Exam: /62   Pulse 77   Temp 97.9 °F (36.6 °C) (Oral)   Resp 17   Ht 5' 4\" (1.626 m)   Wt 215 lb 9.6 oz (97.8 kg)   SpO2 95%   BMI 37.01 kg/m²       Physical Exam:  General: Well Developed, Well Nourished, No Acute Distress, Alert & Oriented  Neck: supple, no JVD  Heart: S1S2 with RRR without murmurs or gallops  Lungs: Clear throughout auscultation bilaterally without adventitious sounds  Abd: soft, nontender, nondistended, with good bowel sounds  Ext: warm, 1+ leg edema  Sternal incision: clean, dry,and intact  Skin: warm and dry      Recent Results (from the past 24 hour(s))   POCT Glucose    Collection Time: 02/02/23 11:10 AM   Result Value Ref Range    POC Glucose 112 (H) 65 - 100 mg/dL    Performed by: WePlann    POCT Glucose    Collection Time: 02/02/23  3:09 PM   Result Value Ref Range    POC Glucose 123 (H) 65 - 100 mg/dL    Performed by: [email or web address]    Magnesium    Collection Time: 02/03/23  6:08 AM   Result Value Ref Range    Magnesium 2.0 1.8 - 2.4 mg/dL   Potassium    Collection Time: 02/03/23  6:08 AM   Result Value Ref Range    Potassium 3.7 3.5 - 5.1 mmol/L          Medication List        START taking these medications      aspirin 81 MG EC tablet  Take 1 tablet by mouth daily     atorvastatin 80 MG tablet  Commonly known as: LIPITOR  Take 1 tablet by mouth nightly     carvedilol 3.125 MG tablet  Commonly known as: COREG  Take 1 tablet by mouth 2 times daily (with meals)     furosemide 20 MG tablet  Commonly known as: Lasix  Take 1 tablet by mouth daily for 3 days            CONTINUE taking these medications      acetaminophen 325 MG tablet  Commonly known as: TYLENOL     DULoxetine 30 MG extended release capsule  Commonly known as: Cymbalta  Take 1 capsule by mouth 2 times daily     meloxicam 7.5 MG tablet  Commonly known as: MOBIC     tiZANidine 2 MG tablet  Commonly known as: Rossana Santamaria taking these medications      ALPRAZolam 1 MG tablet  Commonly known as: XANAX     amiodarone 200 MG tablet  Commonly known as: CORDARONE  Maintained sinus rhythm   diphenhydrAMINE 25 MG capsule  Commonly known as: BENADRYL  She sometimes uses for sleep   metoprolol succinate 25 MG extended release tablet  Commonly known as: TOPROL XL  Now on coreg   ondansetron 4 MG/2ML injection  Commonly known as: ZOFRAN  Has not been using   raNITIdine 150 MG tablet  Commonly known as: ZANTAC  Uses over the counter tums if needed             Where to Get Your Medications        These medications were sent to [facility]/pharmacy #[id number], [place] - [address] [phone] Rosy Grey [phone] [address] [facility]      Phone: [phone]   aspirin 81 MG EC tablet  atorvastatin 80 MG tablet  carvedilol 3.125 MG tablet  furosemide 20 MG tablet        Willa Olivarez, APRN - CNP

## 2023-02-03 NOTE — PROGRESS NOTES
Spiritual Care Visit, Follow Up visit. Patient expecting Discharge today. Visited with patient at bedside. Prayed for patient's healing and health. Visit by Vaughn Rand, Staff .  Nancy, Ginna.B., B.A.

## 2023-02-03 NOTE — PROGRESS NOTES
ACUTE PHYSICAL THERAPY GOALS:   (Developed with and agreed upon by patient and/or caregiver.)  1.)Ms. Joselin Khan will move from supine to sit and sit to supine , scoot up and down, and roll side to side in bed with STAND BY ASSIST within 7 treatment day(s). (2.)Ms. Joselin Khan will transfer from bed to chair and chair to bed with STAND BY ASSIST using the least restrictive device within 7 treatment day(s). (3.)Ms. Joselin Khan will ambulate with STAND BY ASSIST for 100 feet with the least restrictive device within 7 treatment day(s). (4.)Ms. Joselin Khan will perform seated LE exercises for 15 minutes to improve strength and mobility within 7 days. PHYSICAL THERAPY: Daily Note AM   (Link to Caseload Tracking: PT Visit Days : 2  Time In/Out PT Charge Capture  Rehab Caseload Tracker  Orders    Shahid Wang is a 67 y.o. 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) [I25.110]  Chest pain [R07.9]  Heart failure, systolic (HCC) [U49.56]  Abnormal stress test [R94.39]  CAD, multiple vessel [I25.10]  Procedure(s) (LRB):  CABG CORONARY ARTERY BYPASS,(CABGX5), LIMA, ENDOSCOPIC GREATER SAPHENOUS VEIN HARVEST LEFT LEG, TRANSESOPHOGEAL ECHOCARDIOGRAM (N/A)  3 Days Post-Op  Inpatient: Payor: Mega Mckeon / Plan: Mega Mckeon / Product Type: *No Product type* /     ASSESSMENT:     REHAB RECOMMENDATIONS:   Recommendation to date pending progress:  Setting:  Short-term Rehab  Vs HHPT    Equipment:    To Be Determined     ASSESSMENT:  Ms. Joselin Khan is sitting in the recliner and agreeable to therapy.  at bedside. Mobility is slow as the patient does c/o back pain. Sit to stand with min assist to the walker. Gait with rolling walker x 100 feet with slow leandro  with one sitting rest break. Patient is returned to the recliner and after a rest break the patient was instructed in therapeutic exercises ,   tolerated well. Left with needs within reach.   Good session and progress demonstrated. . Patients desire is to return to home with the assistance of her  but may need STR due to her increased back pain and mobility. Continue PT efforts.        SUBJECTIVE:   Ms. Colonel Salomon states, \"Good morning\"     Social/Functional Lives With: Spouse  Type of Home: House  Bathroom Equipment: Shower chair (raised toilet seat)  Home Equipment: Rollator  Receives Help From: Family  ADL Assistance: Independent  Homemaking Assistance: Independent  Ambulation Assistance: Independent  Transfer Assistance: Independent  OBJECTIVE:     PAIN: Varsha [place] / O2: Yoon Marroquin / Meri Breaux / Daisy Roys:   Pre Treatment: not rated         Post Treatment: not rated Vitals        Oxygen    Telemetry  and Wound Vac    RESTRICTIONS/PRECAUTIONS:  Restrictions/Precautions  Restrictions/Precautions: Surgical Protocols  Restrictions/Precautions: Surgical Protocols     MOBILITY: I Mod I S SBA CGA Min Mod Max Total  NT x2 Comments:   Bed Mobility    Rolling [] [] [] [] [] [] [] [] [] [x] []    Supine to Sit [] [] [] [] [] [] [] [] [] [x] []    Scooting [] [] [] [] [] [] [] [] [] [x] []    Sit to Supine [] [] [] [] [] [] [] [] [] [x] []    Transfers    Sit to Stand [] [] [] [] [] [x] [] [] [] [] [x]    Bed to Chair [] [] [] [] [] [] [] [] [] [x] []    Stand to Sit [] [] [] [] [] [x] [] [] [] [] [x]     [] [] [] [] [] [] [] [] [] [] []    I=Independent, Mod I=Modified Independent, S=Supervision, SBA=Standby Assistance, CGA=Contact Guard Assistance,   Min=Minimal Assistance, Mod=Moderate Assistance, Max=Maximal Assistance, Total=Total Assistance, NT=Not Tested    BALANCE: Good Fair+ Fair Fair- Poor NT Comments   Sitting Static [x] [] [] [] [] []    Sitting Dynamic [x] [] [] [] [] []              Standing Static [] [x] [] [] [] []    Standing Dynamic [] [] [x] [] [] []      GAIT: I Mod I S SBA CGA Min Mod Max Total  NT x2 Comments:   Level of Assistance [] [] [] [] [x] [] [] [] [] [] []    Distance 100  feet    DME Rolling Joshua Mock Gait Quality Decreased leandro , Decreased step length, and Decreased stance    Weightbearing Status      Stairs      I=Independent, Mod I=Modified Independent, S=Supervision, SBA=Standby Assistance, CGA=Contact Guard Assistance,   Min=Minimal Assistance, Mod=Moderate Assistance, Max=Maximal Assistance, Total=Total Assistance, NT=Not Tested    PLAN:   FREQUENCY AND DURATION: BID for duration of hospital stay or until stated goals are met, whichever comes first.    TREATMENT:   TREATMENT:   Therapeutic Activity (15 Minutes): Therapeutic activity included Scooting, Ambulation on level ground, Sitting balance , and Standing balance to improve functional Activity tolerance, Balance, Coordination, Mobility, and Strength. Therapeutic Exercise (10 Minutes): Therapeutic exercises noted below to improve functional activity tolerance, AROM, strength, and mobility.      TREATMENT GRID:     Date:  2/3/23 Date:   Date:     ACTIVITY/EXERCISE AM PM AM PM AM PM   LAQ 10        Shoulder shrugs 10        Gluteal sets 10        marching 10        Ankle pumps 10        abduction 10                 B = bilateral; AA = active assistive; A = active; P = passive    AFTER TREATMENT PRECAUTIONS: Bed/Chair Locked, Call light within reach, Chair, Needs within reach, RN notified, and Visitors at bedside    INTERDISCIPLINARY COLLABORATION:  RN/ PCT and PT/ PTA    EDUCATION:  review POV and importance of mobility in the recovery process    TIME IN/OUT:  Time In: 1010  Time Out: [address]  Minutes: 25    Karin Burgos PTA

## 2023-02-04 LAB
GLUCOSE BLD STRIP.AUTO-MCNC: 110 MG/DL (ref 65–100)
MAGNESIUM SERPL-MCNC: 2.1 MG/DL (ref 1.8–2.4)
POTASSIUM SERPL-SCNC: 3.8 MMOL/L (ref 3.5–5.1)
SERVICE CMNT-IMP: ABNORMAL

## 2023-02-04 PROCEDURE — 6370000000 HC RX 637 (ALT 250 FOR IP): Performed by: PHYSICIAN ASSISTANT

## 2023-02-04 PROCEDURE — 82962 GLUCOSE BLOOD TEST: CPT

## 2023-02-04 PROCEDURE — 2580000003 HC RX 258: Performed by: PHYSICIAN ASSISTANT

## 2023-02-04 PROCEDURE — 97530 THERAPEUTIC ACTIVITIES: CPT

## 2023-02-04 PROCEDURE — 36415 COLL VENOUS BLD VENIPUNCTURE: CPT

## 2023-02-04 PROCEDURE — 2140000001 HC CVICU INTERMEDIATE R&B

## 2023-02-04 PROCEDURE — 6370000000 HC RX 637 (ALT 250 FOR IP): Performed by: THORACIC SURGERY (CARDIOTHORACIC VASCULAR SURGERY)

## 2023-02-04 PROCEDURE — 84132 ASSAY OF SERUM POTASSIUM: CPT

## 2023-02-04 PROCEDURE — 83735 ASSAY OF MAGNESIUM: CPT

## 2023-02-04 PROCEDURE — 99024 POSTOP FOLLOW-UP VISIT: CPT | Performed by: THORACIC SURGERY (CARDIOTHORACIC VASCULAR SURGERY)

## 2023-02-04 RX ADMIN — CARVEDILOL 3.12 MG: 3.12 TABLET, FILM COATED ORAL at 17:17

## 2023-02-04 RX ADMIN — GABAPENTIN 300 MG: 300 CAPSULE ORAL at 09:18

## 2023-02-04 RX ADMIN — POTASSIUM CHLORIDE 20 MEQ: 1500 TABLET, EXTENDED RELEASE ORAL at 18:36

## 2023-02-04 RX ADMIN — AMIODARONE HYDROCHLORIDE 200 MG: 200 TABLET ORAL at 20:34

## 2023-02-04 RX ADMIN — GABAPENTIN 300 MG: 300 CAPSULE ORAL at 14:54

## 2023-02-04 RX ADMIN — POTASSIUM CHLORIDE 10 MEQ: 750 TABLET, EXTENDED RELEASE ORAL at 09:17

## 2023-02-04 RX ADMIN — CARVEDILOL 3.12 MG: 3.12 TABLET, FILM COATED ORAL at 09:22

## 2023-02-04 RX ADMIN — ACETAMINOPHEN 650 MG: 325 TABLET ORAL at 17:17

## 2023-02-04 RX ADMIN — SODIUM CHLORIDE, PRESERVATIVE FREE 10 ML: 5 INJECTION INTRAVENOUS at 09:15

## 2023-02-04 RX ADMIN — DULOXETINE HYDROCHLORIDE 60 MG: 60 CAPSULE, DELAYED RELEASE ORAL at 09:18

## 2023-02-04 RX ADMIN — ACETAMINOPHEN 650 MG: 325 TABLET ORAL at 09:25

## 2023-02-04 RX ADMIN — Medication 1 AMPULE: at 09:19

## 2023-02-04 RX ADMIN — FAMOTIDINE 20 MG: 20 TABLET, FILM COATED ORAL at 20:34

## 2023-02-04 RX ADMIN — ATORVASTATIN CALCIUM 80 MG: 80 TABLET, FILM COATED ORAL at 20:34

## 2023-02-04 RX ADMIN — ASPIRIN 81 MG: 81 TABLET, COATED ORAL at 09:17

## 2023-02-04 RX ADMIN — SODIUM CHLORIDE, PRESERVATIVE FREE 10 ML: 5 INJECTION INTRAVENOUS at 20:38

## 2023-02-04 RX ADMIN — GABAPENTIN 300 MG: 300 CAPSULE ORAL at 20:34

## 2023-02-04 RX ADMIN — AMIODARONE HYDROCHLORIDE 200 MG: 200 TABLET ORAL at 09:18

## 2023-02-04 RX ADMIN — FAMOTIDINE 20 MG: 20 TABLET, FILM COATED ORAL at 09:18

## 2023-02-04 RX ADMIN — TRAMADOL HYDROCHLORIDE 50 MG: 50 TABLET ORAL at 12:49

## 2023-02-04 RX ADMIN — POTASSIUM CHLORIDE 20 MEQ: 1500 TABLET, EXTENDED RELEASE ORAL at 09:17

## 2023-02-04 RX ADMIN — Medication 1 AMPULE: at 20:35

## 2023-02-04 RX ADMIN — FUROSEMIDE 40 MG: 20 TABLET ORAL at 10:30

## 2023-02-04 ASSESSMENT — PAIN - FUNCTIONAL ASSESSMENT
PAIN_FUNCTIONAL_ASSESSMENT: ACTIVITIES ARE NOT PREVENTED
PAIN_FUNCTIONAL_ASSESSMENT: PREVENTS OR INTERFERES SOME ACTIVE ACTIVITIES AND ADLS
PAIN_FUNCTIONAL_ASSESSMENT: ACTIVITIES ARE NOT PREVENTED

## 2023-02-04 ASSESSMENT — PAIN SCALES - GENERAL
PAINLEVEL_OUTOF10: 0
PAINLEVEL_OUTOF10: 2
PAINLEVEL_OUTOF10: 2
PAINLEVEL_OUTOF10: 4

## 2023-02-04 ASSESSMENT — PAIN DESCRIPTION - ORIENTATION
ORIENTATION: ANTERIOR;MID

## 2023-02-04 ASSESSMENT — PAIN DESCRIPTION - DESCRIPTORS
DESCRIPTORS: SORE
DESCRIPTORS: ACHING;SORE
DESCRIPTORS: ACHING;SORE

## 2023-02-04 ASSESSMENT — PAIN DESCRIPTION - LOCATION
LOCATION: CHEST;INCISION
LOCATION: CHEST;INCISION
LOCATION: CHEST

## 2023-02-04 NOTE — PROGRESS NOTES
ACUTE PHYSICAL THERAPY GOALS:   (Developed with and agreed upon by patient and/or caregiver.)  1.)Ms. Bo Colunga will move from supine to sit and sit to supine , scoot up and down, and roll side to side in bed with STAND BY ASSIST within 7 treatment day(s). (2.)Ms. Bo Colunga will transfer from bed to chair and chair to bed with STAND BY ASSIST using the least restrictive device within 7 treatment day(s). (3.)Ms. Bo Colunga will ambulate with STAND BY ASSIST for 100 feet with the least restrictive device within 7 treatment day(s). (4.)Ms. Bo Colunga will perform seated LE exercises for 15 minutes to improve strength and mobility within 7 days. PHYSICAL THERAPY: Daily Note PM   (Link to Caseload Tracking: PT Visit Days : 4  Time In/Out PT Charge Capture  Rehab Caseload Tracker  Orders    Manjinder Emmanuel is a 67 y.o. 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) [I25.110]  Chest pain [R07.9]  Heart failure, systolic (HCC) [A01.78]  Abnormal stress test [R94.39]  CAD, multiple vessel [I25.10]  Procedure(s) (LRB):  CABG CORONARY ARTERY BYPASS,(CABGX5), LIMA, ENDOSCOPIC GREATER SAPHENOUS VEIN HARVEST LEFT LEG, TRANSESOPHOGEAL ECHOCARDIOGRAM (N/A)  4 Days Post-Op  Inpatient: Payor: Isreal Rhoades / Plan: Isreal Rhoades / Product Type: *No Product type* /     ASSESSMENT:     REHAB RECOMMENDATIONS:   Recommendation to date pending progress:  Setting:  Short-term Rehab  Vs HHPT    Equipment:    To Be Determined     ASSESSMENT:  Ms. Bo Colunga was sitting up in recliner chair upon contact and agreeable to PT. Patient transferred to standing with SBA and cues for technique to maintain sternal precautions. Patient then ambulated slowly for 200' with rolling walker, CGA-SBA and several short standing rest breaks due to fatigue and slight shortness of breath.  Patient also performed stair training x 3 steps x 2 reps holding bilateral rails with reciprocal gait and no difficulties noted. Patient returned to recliner chair where she took a seated rest break. Patient then participated in LE exercises to BLE's with occasional cues for improved quality of exercise. Encouraged patient to performed exercises throughout the day. Patient and family verbalize understanding. Steady progress. Will continue PT efforts.      SUBJECTIVE:   Ms. Raegan Kevin states, \"I think I will wait until next year to have my back surgery\"     Social/Functional Lives With: Spouse  Type of Home: House  Bathroom Equipment: Shower chair (raised toilet seat)  Home Equipment: Rollator  Receives Help From: Family  ADL Assistance: Independent  Homemaking Assistance: Independent  Ambulation Assistance: Independent  Transfer Assistance: Independent  OBJECTIVE:     PAIN: Elizabeth Bowels / O2: Luane Alu / Ducheryl Royals / Jose Guadalupe Baseman:   Pre Treatment: not rated  Pain Assessment: None - Denies Pain      Post Treatment: not rated Vitals        Oxygen    Telemetry  and Wound Vac    RESTRICTIONS/PRECAUTIONS:  Restrictions/Precautions  Restrictions/Precautions: Surgical Protocols  Restrictions/Precautions: Surgical Protocols     MOBILITY: I Mod I S SBA CGA Min Mod Max Total  NT x2 Comments:   Bed Mobility    Rolling [] [] [] [] [] [] [] [] [] [x] []    Supine to Sit [] [] [] [] [] [] [] [] [] [x] []    Scooting [] [] [] [] [] [] [] [] [] [x] []    Sit to Supine [] [] [] [] [] [] [] [] [] [x] []    Transfers    Sit to Stand [] [] [] [x] [] [] [] [] [] [] []    Bed to Chair [] [] [] [] [] [] [] [] [] [x] []    Stand to Sit [] [] [] [x] [] [] [] [] [] [] []     [] [] [] [] [] [] [] [] [] [] []    I=Independent, Mod I=Modified Independent, S=Supervision, SBA=Standby Assistance, CGA=Contact Guard Assistance,   Min=Minimal Assistance, Mod=Moderate Assistance, Max=Maximal Assistance, Total=Total Assistance, NT=Not Tested    BALANCE: Good Fair+ Fair Fair- Poor NT Comments   Sitting Static [x] [] [] [] [] []    Sitting Dynamic [x] [] [] [] [] [] Standing Static [] [x] [] [] [] []    Standing Dynamic [] [] [x] [] [] []      GAIT: I Mod I S SBA CGA Min Mod Max Total  NT x2 Comments:   Level of Assistance [] [] [] [x] [x] [] [] [] [] [] []    Distance 250  feet and stair training x 3 steps x 2 reps    DME Rolling Walker    Gait Quality Decreased leandro , Decreased step length, and Decreased stance    Weightbearing Status      Stairs      I=Independent, Mod I=Modified Independent, S=Supervision, SBA=Standby Assistance, CGA=Contact Guard Assistance,   Min=Minimal Assistance, Mod=Moderate Assistance, Max=Maximal Assistance, Total=Total Assistance, NT=Not Tested    PLAN:   FREQUENCY AND DURATION: BID for duration of hospital stay or until stated goals are met, whichever comes first.    TREATMENT:   TREATMENT:   Therapeutic Activity (25 Minutes): Therapeutic activity included Scooting, Transfer Training, Ambulation on level ground, Sitting balance , Standing balance, and LE exercises and stair training to improve functional Activity tolerance, Balance, Coordination, Mobility, and Strength. TREATMENT GRID:     Date:  2/3/23 Date:   Date:     ACTIVITY/EXERCISE AM PM AM PM AM PM   LAQ 10 10       Shoulder shrugs 10 10       Gluteal sets 10 10       marching 10 10       Ankle pumps 10 10       abduction 10 10                B = bilateral; AA = active assistive; A = active; P = passive    AFTER TREATMENT PRECAUTIONS: Bed/Chair Locked, Call light within reach, Chair, Needs within reach, RN notified, and Visitors at bedside    INTERDISCIPLINARY COLLABORATION:  RN/ PCT and PT/ PTA    EDUCATION:  review POV and importance of mobility in the recovery process    TIME IN/OUT:  Time In: 1000  Time Out: [place]  Minutes: 100 Sabiha Silva PTA

## 2023-02-04 NOTE — PROGRESS NOTES
CV Progress Note    Subjective: Incisional pain:  moderate  Appetite:  good   Activity: Ambulating with assistance      Objective:     Vitals:  Blood pressure 132/60, pulse 71, temperature 99.3 °F (37.4 °C), temperature source Oral, resp. rate 20, height 5' 4\" (1.626 m), weight 214 lb 9.6 oz (97.3 kg), SpO2 96 %. 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 *      Continue present treatment    See orders for details. Para Cirri.  Roz Schaumann, MD

## 2023-02-04 NOTE — PROGRESS NOTES
Shift Note  3 February 2023 (PM)    1910hrs: Bedside shift report completed with prior shift RN. Patient sitting upright in recliner, and appears comfortable. 2300hrs: Patient has had two large, loose stools with sudden urgency since shift change. PM stool softener held due to diarrhea. 4 February 2023    0000hrs: No changes from initial assessment. 0400hrs: No changes from previous assessment. Potassium   Date Value Ref Range Status   02/04/2023 3.8 3.5 - 5.1 mmol/L Final     Magnesium   Date Value Ref Range Status   02/04/2023 2.1 1.8 - 2.4 mg/dL Final     AM labs reviewed (above). Patient had 3 loose bowel movements overnight. Ambulatory without requiring assistance. Up in chair for the morning.

## 2023-02-04 NOTE — PROGRESS NOTES
Upon arrival to room, patient had medications in packaging on her bedside table. After inquiring patient regarding the medications, patient revealed she had immodium at bedside due to having frequent bowel movements overnight. Educated patient on the importance of only taking medications that are ordered and provided to her by nursing staff during hospital stay due to patient safety. Patient acknowledged education and understood.

## 2023-02-04 NOTE — PROGRESS NOTES
PT Daily Note:  Attempted to see patient for physical therapy this afternoon but patient had just finished her shower and politely requested to rest at this time. RN notified. Will check back on patient at a later date/time if schedule permits. Thank you,  Rosy Celeste.  Shira, PTA

## 2023-02-05 PROBLEM — R94.39 ABNORMAL STRESS TEST: Status: RESOLVED | Noted: 2023-01-25 | Resolved: 2023-02-05

## 2023-02-05 PROBLEM — Z99.11 ENCOUNTER FOR WEANING FROM VENTILATOR (HCC): Status: RESOLVED | Noted: 2023-02-02 | Resolved: 2023-02-05

## 2023-02-05 PROBLEM — D72.829 LEUKOCYTOSIS: Status: RESOLVED | Noted: 2020-10-10 | Resolved: 2023-02-05

## 2023-02-05 PROBLEM — R07.9 CHEST PAIN: Status: RESOLVED | Noted: 2023-01-25 | Resolved: 2023-02-05

## 2023-02-05 PROBLEM — I25.110 ATHEROSCLEROTIC HEART DISEASE OF NATIVE CORONARY ARTERY WITH UNSTABLE ANGINA PECTORIS (HCC): Status: RESOLVED | Noted: 2023-01-25 | Resolved: 2023-02-05

## 2023-02-05 LAB
BASOPHILS # BLD: 0.1 K/UL (ref 0–0.2)
BASOPHILS NFR BLD: 1 % (ref 0–2)
DIFFERENTIAL METHOD BLD: ABNORMAL
EOSINOPHIL # BLD: 0.3 K/UL (ref 0–0.8)
EOSINOPHIL NFR BLD: 4 % (ref 0.5–7.8)
ERYTHROCYTE [DISTWIDTH] IN BLOOD BY AUTOMATED COUNT: 15.1 % (ref 11.9–14.6)
HCT VFR BLD AUTO: 26.7 % (ref 35.8–46.3)
HGB BLD-MCNC: 8.4 G/DL (ref 11.7–15.4)
IMM GRANULOCYTES # BLD AUTO: 0 K/UL (ref 0–0.5)
IMM GRANULOCYTES NFR BLD AUTO: 0 % (ref 0–5)
LYMPHOCYTES # BLD: 1 K/UL (ref 0.5–4.6)
LYMPHOCYTES NFR BLD: 15 % (ref 13–44)
MAGNESIUM SERPL-MCNC: 1.8 MG/DL (ref 1.8–2.4)
MCH RBC QN AUTO: 27.7 PG (ref 26.1–32.9)
MCHC RBC AUTO-ENTMCNC: 31.5 G/DL (ref 31.4–35)
MCV RBC AUTO: 88.1 FL (ref 82–102)
MONOCYTES # BLD: 0.9 K/UL (ref 0.1–1.3)
MONOCYTES NFR BLD: 14 % (ref 4–12)
NEUTS SEG # BLD: 4.4 K/UL (ref 1.7–8.2)
NEUTS SEG NFR BLD: 66 % (ref 43–78)
NRBC # BLD: 0 K/UL (ref 0–0.2)
PLATELET # BLD AUTO: 219 K/UL (ref 150–450)
PMV BLD AUTO: 11.1 FL (ref 9.4–12.3)
POTASSIUM SERPL-SCNC: 3.9 MMOL/L (ref 3.5–5.1)
RBC # BLD AUTO: 3.03 M/UL (ref 4.05–5.2)
WBC # BLD AUTO: 6.7 K/UL (ref 4.3–11.1)

## 2023-02-05 PROCEDURE — 2140000001 HC CVICU INTERMEDIATE R&B

## 2023-02-05 PROCEDURE — 6370000000 HC RX 637 (ALT 250 FOR IP): Performed by: PHYSICIAN ASSISTANT

## 2023-02-05 PROCEDURE — 36415 COLL VENOUS BLD VENIPUNCTURE: CPT

## 2023-02-05 PROCEDURE — 6370000000 HC RX 637 (ALT 250 FOR IP): Performed by: THORACIC SURGERY (CARDIOTHORACIC VASCULAR SURGERY)

## 2023-02-05 PROCEDURE — 85025 COMPLETE CBC W/AUTO DIFF WBC: CPT

## 2023-02-05 PROCEDURE — 97530 THERAPEUTIC ACTIVITIES: CPT

## 2023-02-05 PROCEDURE — 2580000003 HC RX 258: Performed by: PHYSICIAN ASSISTANT

## 2023-02-05 PROCEDURE — 83735 ASSAY OF MAGNESIUM: CPT

## 2023-02-05 PROCEDURE — 84132 ASSAY OF SERUM POTASSIUM: CPT

## 2023-02-05 RX ADMIN — GABAPENTIN 300 MG: 300 CAPSULE ORAL at 20:45

## 2023-02-05 RX ADMIN — SODIUM CHLORIDE, PRESERVATIVE FREE 10 ML: 5 INJECTION INTRAVENOUS at 20:46

## 2023-02-05 RX ADMIN — AMIODARONE HYDROCHLORIDE 200 MG: 200 TABLET ORAL at 20:45

## 2023-02-05 RX ADMIN — POTASSIUM CHLORIDE 20 MEQ: 1500 TABLET, EXTENDED RELEASE ORAL at 08:18

## 2023-02-05 RX ADMIN — ACETAMINOPHEN 650 MG: 325 TABLET ORAL at 08:15

## 2023-02-05 RX ADMIN — DULOXETINE HYDROCHLORIDE 60 MG: 60 CAPSULE, DELAYED RELEASE ORAL at 08:16

## 2023-02-05 RX ADMIN — FAMOTIDINE 20 MG: 20 TABLET, FILM COATED ORAL at 08:16

## 2023-02-05 RX ADMIN — SODIUM CHLORIDE, PRESERVATIVE FREE 10 ML: 5 INJECTION INTRAVENOUS at 08:18

## 2023-02-05 RX ADMIN — CARVEDILOL 3.12 MG: 3.12 TABLET, FILM COATED ORAL at 08:16

## 2023-02-05 RX ADMIN — ACETAMINOPHEN 650 MG: 325 TABLET ORAL at 13:10

## 2023-02-05 RX ADMIN — Medication 1 AMPULE: at 20:45

## 2023-02-05 RX ADMIN — ASPIRIN 81 MG: 81 TABLET, COATED ORAL at 08:17

## 2023-02-05 RX ADMIN — CARVEDILOL 3.12 MG: 3.12 TABLET, FILM COATED ORAL at 17:36

## 2023-02-05 RX ADMIN — GABAPENTIN 300 MG: 300 CAPSULE ORAL at 13:10

## 2023-02-05 RX ADMIN — POTASSIUM CHLORIDE 20 MEQ: 1500 TABLET, EXTENDED RELEASE ORAL at 08:16

## 2023-02-05 RX ADMIN — AMIODARONE HYDROCHLORIDE 200 MG: 200 TABLET ORAL at 08:15

## 2023-02-05 RX ADMIN — FAMOTIDINE 20 MG: 20 TABLET, FILM COATED ORAL at 20:45

## 2023-02-05 RX ADMIN — ACETAMINOPHEN 650 MG: 325 TABLET ORAL at 20:45

## 2023-02-05 RX ADMIN — Medication 1 AMPULE: at 08:14

## 2023-02-05 RX ADMIN — GABAPENTIN 300 MG: 300 CAPSULE ORAL at 08:15

## 2023-02-05 RX ADMIN — ATORVASTATIN CALCIUM 80 MG: 80 TABLET, FILM COATED ORAL at 20:45

## 2023-02-05 ASSESSMENT — PAIN SCALES - GENERAL
PAINLEVEL_OUTOF10: 0
PAINLEVEL_OUTOF10: 3
PAINLEVEL_OUTOF10: 0
PAINLEVEL_OUTOF10: 2
PAINLEVEL_OUTOF10: 2

## 2023-02-05 ASSESSMENT — PAIN DESCRIPTION - ORIENTATION: ORIENTATION: ANTERIOR;MID

## 2023-02-05 ASSESSMENT — PAIN DESCRIPTION - LOCATION
LOCATION: CHEST;INCISION
LOCATION: BACK;CHEST
LOCATION: INCISION

## 2023-02-05 ASSESSMENT — PAIN DESCRIPTION - DESCRIPTORS
DESCRIPTORS: ACHING
DESCRIPTORS: ACHING

## 2023-02-05 NOTE — PROGRESS NOTES
ACUTE PHYSICAL THERAPY GOALS:   (Developed with and agreed upon by patient and/or caregiver.)  1.)Ms. Humberto Goss will move from supine to sit and sit to supine , scoot up and down, and roll side to side in bed with STAND BY ASSIST within 7 treatment day(s). (2.)Ms. Humberto Goss will transfer from bed to chair and chair to bed with STAND BY ASSIST using the least restrictive device within 7 treatment day(s). (3.)Ms. Humberto Goss will ambulate with STAND BY ASSIST for 100 feet with the least restrictive device within 7 treatment day(s). (4.)Ms. Humberto Goss will perform seated LE exercises for 15 minutes to improve strength and mobility within 7 days. PHYSICAL THERAPY: Daily Note PM   (Link to Caseload Tracking: PT Visit Days : 5  Time In/Out PT Charge Capture  Rehab Caseload Tracker  Orders    Hernandez Altamirano is a 67 y.o. 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) [I25.110]  Chest pain [R07.9]  Heart failure, systolic (HCC) [U07.74]  Abnormal stress test [R94.39]  CAD, multiple vessel [I25.10]  Procedure(s) (LRB):  CABG CORONARY ARTERY BYPASS,(CABGX5), LIMA, ENDOSCOPIC GREATER SAPHENOUS VEIN HARVEST LEFT LEG, TRANSESOPHOGEAL ECHOCARDIOGRAM (N/A)  5 Days Post-Op  Inpatient: Payor: Kaleb Burroughs / Plan: Kaleb Burroughs / Product Type: *No Product type* /     ASSESSMENT:     REHAB RECOMMENDATIONS:   Recommendation to date pending progress:  Setting:  Home Health Therapy    Equipment:    To Be Determined     ASSESSMENT:  Ms. Humberto Goss was sitting up in recliner chair upon contact and agreeable to PT. Of note, Patient frequently requires cues to maintain sternal precautions throughout treatment. Patient transferred to standing with SBA and use of heart pillow to ensure sternal precautions are maintained. Patient then ambulated slowly for 200' with rolling walker, CGA-SBA and cues for posture/pursed lipped breathing.  Patient reports ambulation seems to be getting easier. Patient returned to recliner chair where she requested to defer LE exercises this afternoon. Encouraged patient to performed exercises later today. Patient and family verbalize understanding. Steady progress. Will continue PT efforts.      SUBJECTIVE:   Ms. Chidi Corona states, \"I keep forgetting\"     Social/Functional Lives With: Spouse  Type of Home: House  Bathroom Equipment: Shower chair (raised toilet seat)  Home Equipment: Rollator  Receives Help From: Family  ADL Assistance: Independent  Homemaking Assistance: Independent  Ambulation Assistance: Independent  Transfer Assistance: Independent  OBJECTIVE:     PAIN: Kelley Gins / O2: [place] Kunal / Ruben Feil / Glen Brass:   Pre Treatment: not rated  Pain Assessment: None - Denies Pain      Post Treatment: not rated Vitals        Oxygen    Telemetry  and Wound Vac    RESTRICTIONS/PRECAUTIONS:  Restrictions/Precautions  Restrictions/Precautions: Surgical Protocols  Restrictions/Precautions: Surgical Protocols     MOBILITY: I Mod I S SBA CGA Min Mod Max Total  NT x2 Comments:   Bed Mobility    Rolling [] [] [] [] [] [] [] [] [] [x] []    Supine to Sit [] [] [] [] [] [] [] [] [] [x] []    Scooting [] [] [] [] [] [] [] [] [] [x] []    Sit to Supine [] [] [] [] [] [] [] [] [] [x] []    Transfers    Sit to Stand [] [] [] [x] [] [] [] [] [] [] []    Bed to Chair [] [] [] [] [] [] [] [] [] [x] []    Stand to Sit [] [] [] [x] [] [] [] [] [] [] []     [] [] [] [] [] [] [] [] [] [] []    I=Independent, Mod I=Modified Independent, S=Supervision, SBA=Standby Assistance, CGA=Contact Guard Assistance,   Min=Minimal Assistance, Mod=Moderate Assistance, Max=Maximal Assistance, Total=Total Assistance, NT=Not Tested    BALANCE: Good Fair+ Fair Fair- Poor NT Comments   Sitting Static [x] [] [] [] [] []    Sitting Dynamic [x] [] [] [] [] []              Standing Static [] [x] [] [] [] []    Standing Dynamic [] [] [x] [] [] []      GAIT: I Mod I S SBA CGA Min Mod Max Total  NT x2 Comments:   Level of Assistance [] [] [] [x] [x] [] [] [] [] [] []    Distance 200  feet    DME Rolling Walker    Gait Quality Decreased leandro , Decreased step length, and Decreased stance    Weightbearing Status      Stairs      I=Independent, Mod I=Modified Independent, S=Supervision, SBA=Standby Assistance, CGA=Contact Guard Assistance,   Min=Minimal Assistance, Mod=Moderate Assistance, Max=Maximal Assistance, Total=Total Assistance, NT=Not Tested    PLAN:   FREQUENCY AND DURATION: BID for duration of hospital stay or until stated goals are met, whichever comes first.    TREATMENT:   TREATMENT:   Therapeutic Activity (15 Minutes): Therapeutic activity included Scooting, Transfer Training, Ambulation on level ground, Sitting balance , and Standing balance to improve functional Activity tolerance, Balance, Mobility, and Strength. TREATMENT GRID:     Date:  2/3/23 Date:   Date:     ACTIVITY/EXERCISE AM PM AM PM AM PM   LAQ 10 10       Shoulder shrugs 10 10       Gluteal sets 10 10       marching 10 10       Ankle pumps 10 10       abduction 10 10                B = bilateral; AA = active assistive; A = active; P = passive    AFTER TREATMENT PRECAUTIONS: Bed/Chair Locked, Call light within reach, Chair, Needs within reach, RN notified, and Visitors at bedside    INTERDISCIPLINARY COLLABORATION:  RN/ PCT and PT/ PTA    EDUCATION:  review POV and importance of mobility in the recovery process    TIME IN/OUT:  Time In: 1308  Time Out: 700 [facility]  Minutes: 541 [facility].  MAKI Silva

## 2023-02-05 NOTE — PROGRESS NOTES
ACUTE PHYSICAL THERAPY GOALS:   (Developed with and agreed upon by patient and/or caregiver.)  1.)Ms. Kindra Russo will move from supine to sit and sit to supine , scoot up and down, and roll side to side in bed with STAND BY ASSIST within 7 treatment day(s). (2.)Ms. Kindra Russo will transfer from bed to chair and chair to bed with STAND BY ASSIST using the least restrictive device within 7 treatment day(s). (3.)Ms. Kindra Russo will ambulate with STAND BY ASSIST for 100 feet with the least restrictive device within 7 treatment day(s). (4.)Ms. Kindra Russo will perform seated LE exercises for 15 minutes to improve strength and mobility within 7 days. PHYSICAL THERAPY: Daily Note PM   (Link to Caseload Tracking: PT Visit Days : 5  Time In/Out PT Charge Capture  Rehab Caseload Tracker  Orders    Yazmin Campa is a 67 y.o. 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) [I25.110]  Chest pain [R07.9]  Heart failure, systolic (HCC) [Y69.00]  Abnormal stress test [R94.39]  CAD, multiple vessel [I25.10]  Procedure(s) (LRB):  CABG CORONARY ARTERY BYPASS,(CABGX5), LIMA, ENDOSCOPIC GREATER SAPHENOUS VEIN HARVEST LEFT LEG, TRANSESOPHOGEAL ECHOCARDIOGRAM (N/A)  5 Days Post-Op  Inpatient: Payor: Dany Garnica / Plan: Dany Garnica / Product Type: *No Product type* /     ASSESSMENT:     REHAB RECOMMENDATIONS:   Recommendation to date pending progress:  Setting:  Short-term Rehab  Vs HHPT    Equipment:    To Be Determined     ASSESSMENT:  Ms. Kindra Russo was sitting up in recliner chair upon contact and agreeable to PT. Patient transferred to standing with SBA and cues for technique to maintain sternal precautions. Patient then ambulated slowly for 200' with rolling walker, CGA-SBA and cues for posture/pursed lipped breathing. Patient seemed to tolerate ambulation better this morning with less fatigue noted.  Patient also performed stair training x 3 steps x 2 reps holding bilateral rails with reciprocal gait and no difficulties noted. Patient returned to recliner chair where she took a seated rest break. Patient then participated in LE exercises to BLE's with occasional cues for improved quality of exercise. Encouraged patient to performed exercises throughout the day. Patient and family verbalize understanding. Steady progress. Will continue PT efforts.      SUBJECTIVE:   Ms. Rodríguez Median states, \"I felt so bad for so long\"     Social/Functional Lives With: Spouse  Type of Home: House  Bathroom Equipment: Shower chair (raised toilet seat)  Home Equipment: Rollator  Receives Help From: Family  ADL Assistance: Independent  Homemaking Assistance: Independent  Ambulation Assistance: Independent  Transfer Assistance: Independent  OBJECTIVE:     PAIN: Lyndle Cerise / O2: Alanna Chapman / Pavel Saba / Lashae Adams:   Pre Treatment: not rated  Pain Assessment: None - Denies Pain      Post Treatment: not rated Vitals        Oxygen    Telemetry  and Wound Vac    RESTRICTIONS/PRECAUTIONS:  Restrictions/Precautions  Restrictions/Precautions: Surgical Protocols  Restrictions/Precautions: Surgical Protocols     MOBILITY: I Mod I S SBA CGA Min Mod Max Total  NT x2 Comments:   Bed Mobility    Rolling [] [] [] [] [] [] [] [] [] [x] []    Supine to Sit [] [] [] [] [] [] [] [] [] [x] []    Scooting [] [] [] [] [] [] [] [] [] [x] []    Sit to Supine [] [] [] [] [] [] [] [] [] [x] []    Transfers    Sit to Stand [] [] [] [x] [] [] [] [] [] [] []    Bed to Chair [] [] [] [] [] [] [] [] [] [x] []    Stand to Sit [] [] [] [x] [] [] [] [] [] [] []     [] [] [] [] [] [] [] [] [] [] []    I=Independent, Mod I=Modified Independent, S=Supervision, SBA=Standby Assistance, CGA=Contact Guard Assistance,   Min=Minimal Assistance, Mod=Moderate Assistance, Max=Maximal Assistance, Total=Total Assistance, NT=Not Tested    BALANCE: Good Fair+ Fair Fair- Poor NT Comments   Sitting Static [x] [] [] [] [] []    Sitting Dynamic [x] [] [] [] [] [] Standing Static [] [x] [] [] [] []    Standing Dynamic [] [] [x] [] [] []      GAIT: I Mod I S SBA CGA Min Mod Max Total  NT x2 Comments:   Level of Assistance [] [] [] [x] [x] [] [] [] [] [] []    Distance 200  feet and stair training x 3 steps x 2 reps    DME Rolling Walker    Gait Quality Decreased leandro , Decreased step length, and Decreased stance    Weightbearing Status      Stairs      I=Independent, Mod I=Modified Independent, S=Supervision, SBA=Standby Assistance, CGA=Contact Guard Assistance,   Min=Minimal Assistance, Mod=Moderate Assistance, Max=Maximal Assistance, Total=Total Assistance, NT=Not Tested    PLAN:   FREQUENCY AND DURATION: BID for duration of hospital stay or until stated goals are met, whichever comes first.    TREATMENT:   TREATMENT:   Therapeutic Activity (24 Minutes): Therapeutic activity included Scooting, Transfer Training, Ambulation on level ground, Sitting balance , Standing balance, and LE exercises and stair training to improve functional Activity tolerance, Balance, Coordination, Mobility, and Strength. TREATMENT GRID:     Date:  2/3/23 Date:   Date:     ACTIVITY/EXERCISE AM PM AM PM AM PM   LAQ 10 10       Shoulder shrugs 10 10       Gluteal sets 10 10       marching 10 10       Ankle pumps 10 10       abduction 10 10                B = bilateral; AA = active assistive; A = active; P = passive    AFTER TREATMENT PRECAUTIONS: Bed/Chair Locked, Call light within reach, Chair, Needs within reach, RN notified, and Visitors at bedside    INTERDISCIPLINARY COLLABORATION:  RN/ PCT and PT/ PTA    EDUCATION:  review POV and importance of mobility in the recovery process    TIME IN/OUT:  Time In: 8175  Time Out: [address]  [facility]: [address] [facility] P[facility]

## 2023-02-05 NOTE — PROGRESS NOTES
CV Progress Note    Subjective: Incisional pain:  moderate  Appetite:  good   Activity: Ambulating well      Objective:     Vitals:  Blood pressure (!) 149/67, pulse 74, temperature 98.2 °F (36.8 °C), temperature source Temporal, resp. rate 18, height 5' 4\" (1.626 m), weight 214 lb 6.4 oz (97.3 kg), SpO2 100 %. Temp (24hrs), Av °F (36.7 °C), Min:97.3 °F (36.3 °C), Max:99.3 °F (37.4 °C)        Plan/Recommendations/Medical Decision Making:     Principal Problem:    S/P CABG x 5  Active Problems:    CAD, multiple vessel    Heart failure with reduced ejection fraction ([address]    Hypoxia    Encounter for weaning from ventilator [facility])    Atelectasis    Encounter for rehabilitation evaluation    Abnormality of gait and mobility    Decreased independence with activities of daily living    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 *      Continue present treatment  Doing well, home tomorrow  See orders for details. Khris Bennett.  Janene Tolentino MD

## 2023-02-06 VITALS
DIASTOLIC BLOOD PRESSURE: 75 MMHG | RESPIRATION RATE: 16 BRPM | SYSTOLIC BLOOD PRESSURE: 174 MMHG | BODY MASS INDEX: 36.6 KG/M2 | OXYGEN SATURATION: 96 % | TEMPERATURE: 97.8 F | HEIGHT: 64 IN | WEIGHT: 214.4 LBS | HEART RATE: 76 BPM

## 2023-02-06 LAB
MAGNESIUM SERPL-MCNC: 1.9 MG/DL (ref 1.8–2.4)
POTASSIUM SERPL-SCNC: 4.1 MMOL/L (ref 3.5–5.1)

## 2023-02-06 PROCEDURE — 6370000000 HC RX 637 (ALT 250 FOR IP): Performed by: PHYSICIAN ASSISTANT

## 2023-02-06 PROCEDURE — 97530 THERAPEUTIC ACTIVITIES: CPT

## 2023-02-06 PROCEDURE — 36415 COLL VENOUS BLD VENIPUNCTURE: CPT

## 2023-02-06 PROCEDURE — 6370000000 HC RX 637 (ALT 250 FOR IP): Performed by: THORACIC SURGERY (CARDIOTHORACIC VASCULAR SURGERY)

## 2023-02-06 PROCEDURE — 83735 ASSAY OF MAGNESIUM: CPT

## 2023-02-06 PROCEDURE — 97110 THERAPEUTIC EXERCISES: CPT

## 2023-02-06 PROCEDURE — 84132 ASSAY OF SERUM POTASSIUM: CPT

## 2023-02-06 RX ORDER — FUROSEMIDE 20 MG/1
20 TABLET ORAL DAILY
Qty: 3 TABLET | Refills: 1 | Status: SHIPPED | OUTPATIENT
Start: 2023-02-06 | End: 2023-02-09

## 2023-02-06 RX ORDER — ATORVASTATIN CALCIUM 80 MG/1
80 TABLET, FILM COATED ORAL NIGHTLY
Qty: 30 TABLET | Refills: 3 | Status: SHIPPED | OUTPATIENT
Start: 2023-02-06

## 2023-02-06 RX ORDER — CARVEDILOL 3.12 MG/1
3.12 TABLET ORAL 2 TIMES DAILY WITH MEALS
Qty: 60 TABLET | Refills: 3 | Status: SHIPPED | OUTPATIENT
Start: 2023-02-06

## 2023-02-06 RX ORDER — ASPIRIN 81 MG/1
81 TABLET ORAL DAILY
Qty: 30 TABLET | Refills: 3 | Status: SHIPPED | OUTPATIENT
Start: 2023-02-06

## 2023-02-06 RX ADMIN — ACETAMINOPHEN 650 MG: 325 TABLET ORAL at 09:41

## 2023-02-06 RX ADMIN — ASPIRIN 81 MG: 81 TABLET, COATED ORAL at 09:40

## 2023-02-06 RX ADMIN — GABAPENTIN 300 MG: 300 CAPSULE ORAL at 09:41

## 2023-02-06 RX ADMIN — Medication 1 AMPULE: at 09:40

## 2023-02-06 RX ADMIN — CARVEDILOL 3.12 MG: 3.12 TABLET, FILM COATED ORAL at 09:42

## 2023-02-06 RX ADMIN — DULOXETINE HYDROCHLORIDE 60 MG: 60 CAPSULE, DELAYED RELEASE ORAL at 09:42

## 2023-02-06 RX ADMIN — FAMOTIDINE 20 MG: 20 TABLET, FILM COATED ORAL at 09:42

## 2023-02-06 RX ADMIN — AMIODARONE HYDROCHLORIDE 200 MG: 200 TABLET ORAL at 09:42

## 2023-02-06 ASSESSMENT — PAIN SCALES - GENERAL
PAINLEVEL_OUTOF10: 2
PAINLEVEL_OUTOF10: 0

## 2023-02-06 NOTE — PROGRESS NOTES
ACUTE PHYSICAL THERAPY GOALS:   (Developed with and agreed upon by patient and/or caregiver.)  1.)Ms. Khadra Good will move from supine to sit and sit to supine , scoot up and down, and roll side to side in bed with STAND BY ASSIST within 7 treatment day(s). (2.)Ms. Khadra Good will transfer from bed to chair and chair to bed with STAND BY ASSIST using the least restrictive device within 7 treatment day(s). (3.)Ms. Khadra Good will ambulate with STAND BY ASSIST for 100 feet with the least restrictive device within 7 treatment day(s). (4.)Ms. Khadra Good will perform seated LE exercises for 15 minutes to improve strength and mobility within 7 days. PHYSICAL THERAPY: Daily Note AM   (Link to Caseload Tracking: PT Visit Days : 5  Time In/Out PT Charge Capture  Rehab Caseload Tracker  Orders    Esvin Bell is a 67 y.o. 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) [I25.110]  Chest pain [R07.9]  Heart failure, systolic (HCC) [P46.85]  Abnormal stress test [R94.39]  CAD, multiple vessel [I25.10]  Procedure(s) (LRB):  CABG CORONARY ARTERY BYPASS,(CABGX5), LIMA, ENDOSCOPIC GREATER SAPHENOUS VEIN HARVEST LEFT LEG, TRANSESOPHOGEAL ECHOCARDIOGRAM (N/A)  6 Days Post-Op  Inpatient: Payor: Catheline Angles / Plan: Catheline Angles / Product Type: *No Product type* /     ASSESSMENT:     REHAB RECOMMENDATIONS:   Recommendation to date pending progress:  Setting:  Home Health Therapy    Equipment:    None  Has rollator     ASSESSMENT:  Ms. Khadra Good was sitting up in recliner chair upon contact and agreeable to PT.  at bedside. Patient wanted to don her pajama pants,  assisted. Sit to stand with contact guard assist tot he rollator  Patient then ambulated slowly x 200'. Patient returned to recliner chair where she took a seated rest break. Patient then participated in therapeutic exercises . Good session and progress demonstrated. Will continue PT efforts. Discharging home at d/c.   [facility]     SUBJECTIVE:   Ms. Kenia Woodall states, \"Good morning\"     Social/Functional Lives With: Spouse  Type of Home: House  Bathroom Equipment: Shower chair (raised toilet seat)  Home Equipment: Rollator  Receives Help From: Family  ADL Assistance: Independent  Homemaking Assistance: Independent  Ambulation Assistance: Independent  Transfer Assistance: Independent  OBJECTIVE:     PAIN: Anna Estrin / O2: Oscar Castillo / Trinidad Velásquez / Jennifer Krueger:   Pre Treatment: not rated         Post Treatment: not rated Vitals        Oxygen    Telemetry  and Wound Vac    RESTRICTIONS/PRECAUTIONS:  Restrictions/Precautions  Restrictions/Precautions: Surgical Protocols  Restrictions/Precautions: Surgical Protocols     MOBILITY: I Mod I S SBA CGA Min Mod Max Total  NT x2 Comments:   Bed Mobility    Rolling [] [] [] [] [] [] [] [] [] [x] []    Supine to Sit [] [] [] [] [] [] [] [] [] [x] []    Scooting [] [] [] [] [] [] [] [] [] [x] []    Sit to Supine [] [] [] [] [] [] [] [] [] [x] []    Transfers    Sit to Stand [] [] [] [x] [] [] [] [] [] [] []    Bed to Chair [] [] [] [] [] [] [] [] [] [x] []    Stand to Sit [] [] [] [x] [] [] [] [] [] [] []     [] [] [] [] [] [] [] [] [] [] []    I=Independent, Mod I=Modified Independent, S=Supervision, SBA=Standby Assistance, CGA=Contact Guard Assistance,   Min=Minimal Assistance, Mod=Moderate Assistance, Max=Maximal Assistance, Total=Total Assistance, NT=Not Tested    BALANCE: Good Fair+ Fair Fair- Poor NT Comments   Sitting Static [x] [] [] [] [] []    Sitting Dynamic [x] [] [] [] [] []              Standing Static [] [x] [] [] [] []    Standing Dynamic [] [] [x] [] [] []      GAIT: I Mod I S SBA CGA Min Mod Max Total  NT x2 Comments:   Level of Assistance [] [] [] [] [x] [] [] [] [] [] []    Distance 200 feet      DME Rollator    Gait Quality Decreased leandro , Decreased step length, and Decreased stance    Weightbearing Status      Stairs      I=Independent, Mod I=Modified Independent, S=Supervision, SBA=Standby Assistance, CGA=Contact Guard Assistance,   Min=Minimal Assistance, Mod=Moderate Assistance, Max=Maximal Assistance, Total=Total Assistance, NT=Not Tested    PLAN:   FREQUENCY AND DURATION: BID for duration of hospital stay or until stated goals are met, whichever comes first.    TREATMENT:   TREATMENT:   Therapeutic Activity (14 Minutes): Therapeutic activity included Scooting, Transfer Training, Ambulation on level ground, Sitting balance , and Standing balance to improve functional Activity tolerance, Balance, Coordination, Mobility, and Strength. Therapeutic Exercise (12 Minutes): Therapeutic exercises noted below to improve functional activity tolerance, AROM, strength, and mobility.      TREATMENT GRID:     Date:  2/3/23 Date:  2/6/23 Date:     ACTIVITY/EXERCISE AM PM AM PM AM PM   LAQ 10 10 20      Shoulder shrugs 10 10 20      Gluteal sets 10 10 20      marching 10 10 20      Ankle pumps 10 10 20      abduction 10 10 20               B = bilateral; AA = active assistive; A = active; P = passive    AFTER TREATMENT PRECAUTIONS: Bed/Chair Locked, Call light within reach, Chair, Needs within reach, RN notified, and Visitors at bedside    INTERDISCIPLINARY COLLABORATION:  RN/ PCT and PT/ PTA    EDUCATION:  review PC and importance of mobility in the recovery process    TIME IN/OUT:  Time In: 0900  Time Out: 0926  Minutes: 32    Karin Burgos PTA

## 2023-02-06 NOTE — CARE COORDINATION
Pt with discharge orders this day. This CM met with pt and spouse this day - they have decided to return home with home health and feel comfortable with that decision. This CM sent updated clinicals to [facility]  this day. This CM called home health and spoke with representative to alert them to pt discharge home this day. No additional CM needs at discharge. 01/31/23 1300   Service Assessment   Patient Orientation Alert and Oriented   Cognition Alert   History Provided By Significant Other   Primary Caregiver Self   Support Systems Spouse/Significant Other   PCP Verified by CM Yes   Last Visit to PCP Within last 3 months   Prior Functional Level Independent in ADLs/IADLs   Current Functional Level Other (see comment)  (TBD by clinical team)   Can patient return to prior living arrangement Yes   Ability to make needs known: Good   Family able to assist with home care needs: Yes   Social/Functional History   Lives With Spouse   Type of Home House   Discharge Planning   Type of Residence House   Living Arrangements Spouse/Significant Other   Type of [address] PT;Nursing Services   Patient expects to be discharged to: [address] Discharge   Confirm Follow Up Transport Family   Condition of Participation: Discharge Planning   The Plan for Transition of Care is related to the following treatment goals: Home with spouse   The Patient and/or Patient Representative was provided with a Choice of Provider? Patient;Patient Representative   Name of the Patient Representative who was provided with the Choice of Provider and agrees with the Discharge Plan? Mr Prema Arriaga   The Patient and/Or Patient Representative agree with the Discharge Plan? Yes   Freedom of Choice list was provided with basic dialogue that supports the patient's individualized plan of care/goals, treatment preferences, and shares the quality data associated with the providers?   Yes Family

## 2023-02-06 NOTE — PROGRESS NOTES
Discharge instructions, follow up appointments and prescriptions reviewed with patient and spouse. Both verbalize understanding. All personal belongings taken with patient. Family member will drive patient home. Patient escorted to discharge area via wheelchair. Patient is stable at discharge.

## 2023-02-06 NOTE — DISCHARGE INSTRUCTIONS
The patient is being discharged home in stable condition on a low saturated fat, low cholesterol, and low salt diet. The patient is instructed to avoid all heavy lifting or activities that strain the chest or upper arm muscles. Strenuous activity should be avoided. The patient is instructed to watch for signs of infection which include: increasing area of redness, fever, or purulent drainage at the incision site. The patient is instructed to call the office or return to the ER for immediate evaluation for any shortness of breath or chain pain not relieved by NTG.

## 2023-02-07 ENCOUNTER — TELEPHONE (OUTPATIENT)
Dept: CARDIOLOGY CLINIC | Age: 73
End: 2023-02-07

## 2023-02-07 ENCOUNTER — TELEPHONE (OUTPATIENT)
Dept: CARDIAC SURGERY | Age: 73
End: 2023-02-07

## 2023-02-07 NOTE — TELEPHONE ENCOUNTER
Sohail Barron called back stating Alexei Almonte called the pt. She called in Tramadol to see if that would help with the discomfort. I called the pt back. She said Alexei Almonte didn't think she needed NTG & called in a RX for Tramadol. She said she took 1 tablet an hour ago. She said she think it has helped a little bit. Discharge medications reviewed with the pt. She said she has started the meds given at discharge. She also states her site looks good. No signs of infection. Follow up appt confirmed with the pt/     Pt advised to go to the ER if chest pain continues or worsens. Pt agreed.

## 2023-02-07 NOTE — TELEPHONE ENCOUNTER
S/w Santos Tee at Dr. Francisco Thompson office. She said she would send a message to Pat Varela NP and either she or Pat Varela will call e back regarding the NTG.

## 2023-02-07 NOTE — TELEPHONE ENCOUNTER
TRANSITIONS OF CARE CALL    ADMIT: 1/31/23  DISCHARGE: 2/6/23  DX: CAD/ SP CABG  TC APPT: 2/13/23 8:00 DR. ARELLANO    S/w pt. States she had some CP/ Back pain last night & some this morning. No c/o SOB. Pt states she has chronic back pain & is treated by Dr Abdirashid Blair. She is concerned because she can't tell if the pain is from her Heart or back. States her DC Summary mentions taking NTG for CP & going to the ER if CP not relieved with NTG but states she never received a RX. Pt's 1st office visit at our office isn't scheduled until 2/13/23. I told the pt I would contact Dr. Soniya Beck office to see if they want to prescribe NTG.

## 2023-02-07 NOTE — CARE COORDINATION
This CM received notice from practitioner that pt reports she has not heard from [facility] yet. There was some confusion because pt had told this CM yesterday at discharge that home health had called her  and that she would call them when she returned home that day. This CM had called [facility] yesterday and let them know pt was discharging and she would reach out to them to discuss scheduling. This CM called [facility] this day and apologized for the confusion and requested they reach out to pt this day - they report they will. This CM called pt and updated her to please look out for a call from [facility] this day for scheduling - she verbalized understanding. No additional CM needs at this time.

## 2023-02-09 NOTE — PROGRESS NOTES
Physician Progress Note      PATIENT:               Neel Saunders  CSN #:                  [id number]  :                       1950  ADMIT DATE:       2023 5:40 AM  DISCH DATE:        2023 12:00 PM  RESPONDING  PROVIDER #:        Jolie Barker NP          QUERY TEXT:    Pt admitted with CAD with CABG x 5 and has sCHF documented. If possible,   please document in progress notes and discharge summary further specificity   regarding the type and acuity of CHF:      The medical record reflects the following:  Risk Factors: 67 YOF, CAD s/p CABG x 5,  Clinical Indicators:  PN:  Heart failure with reduced ejection fraction   ANES note; YAQUELIN: Left Ventricle: Cavity size dilated. Hypertrophy not   present. Thrombus not present. Global Function moderately impaired. Ejection Fraction 35%. Treatment: Monitoring, Lasix 20mg IVP, Lasix 40mg BID, Coreg, Lopressor,    Thank you,  Rosalio Ling RN,C BSN  Clinical   [email or web address]  Options provided:  -- Acute on Chronic Systolic CHF/HFrEF  -- Acute Systolic CHF/HFrEF  -- Chronic Systolic CHF/HFrEF  -- Other - I will add my own diagnosis  -- Disagree - Not applicable / Not valid  -- Disagree - Clinically unable to determine / Unknown  -- Refer to Clinical Documentation Reviewer    PROVIDER RESPONSE TEXT:    Provider disagreed with this query. she has a normal EF and no evidence of HF    Query created by:  Angelika Stevens on 2023 11:06 AM      Electronically signed by:  Jolie Barker NP 2023 12:28 PM

## 2023-02-10 NOTE — PROGRESS NOTES
[facility] CARDIOLOGY Follow Up                 Reason for Visit: Posthospitalization follow-up    Subjective:     Patient is a 67 y.o. female with a PMH of CAD status post CABG, ischemic cardiomyopathy, and HFrEF who presents as a posthospitalization follow-up. The patient was admitted for elective CABG in early February 2023 after work-up was completed at OSH. She underwent CABG x5 on January 31. The patient had a TTE in January that was noted to demonstrate an EF of 35 to 40%. The patient reports ROCHE but denies angina. The patient reports that she gets ROCHE walking 20 feet \"down the salas\". She reports he was discharged feeling poorly and reports tachycardia at least since discharge. The patient reports tachycardia with heart rates in the 110s with home health. She reports chronic back pain.       Past Medical History:   Diagnosis Date    Arthritis     osteo    CAD, multiple vessel 01/25/2023    Chronic pain     r/t arthritis in hip and knees    Closed fracture of left proximal humerus 10/10/2020    COVID-19 01/2020    never formally tested, reports symptoms of severe headache, fevers, loss of taste and smell    DDD (degenerative disc disease)     \"back\", chronic pain in lower back    GERD (gastroesophageal reflux disease)     controlled w/ OTC med    Psychiatric disorder     PTSD, severe anxiety with medical procedures    Seizure [facility])     patient denies seizure, states was evaluated by Neurologist and was told she only had syncopal episode    Spondylolisthesis of lumbar region     severe spinal canal stenosis L4-5    Syncope 10/11/2020    Thyroid disease     history of hypo in past. No current issues, no meds      Past Surgical History:   Procedure Laterality Date    CORONARY ARTERY BYPASS GRAFT N/A 1/31/2023    CABG CORONARY ARTERY BYPASS,(CABGX5), LIMA, ENDOSCOPIC GREATER SAPHENOUS VEIN HARVEST LEFT LEG, TRANSESOPHOGEAL ECHOCARDIOGRAM performed by Gopi Jalloh MD at [address] [place] [address] Left 2018    SHOULDER SURGERY Left 2019    TOTAL HIP ARTHROPLASTY Right 04/2014      Family History   Problem Relation Age of Onset    Diabetes Mother     Osteoarthritis Mother     Osteoarthritis Father     Lung Disease Father     Cancer Mother       Social History     Tobacco Use    Smoking status: Never    Smokeless tobacco: Never   Substance Use Topics    Alcohol use: Yes     Comment: rarely only on social occasions      Allergies   Allergen Reactions    Contrast [Barium-Containing Compounds] Other (See Comments)     \"Blood Poisoning\"  States, Can take if premedicated with benadryl and prednisone    Oxycodone Nausea And Vomiting and Other (See Comments)     \" incoherent \"     Sulfa Antibiotics Rash         ROS:  No obvious pertinent positives on review of systems except for what was outlined above.        Objective:       /84   Pulse 88   Ht 5' 4\" (1.626 m)   Wt 209 lb 3.2 oz (94.9 kg) Comment: with shoes  BMI 35.91 kg/m²     BP Readings from Last 3 Encounters:   02/13/23 138/84   02/06/23 (!) 174/75   01/27/23 (!) 171/93       Wt Readings from Last 3 Encounters:   02/13/23 209 lb 3.2 oz (94.9 kg)   02/05/23 214 lb 6.4 oz (97.3 kg)   01/27/23 196 lb 8 oz (89.1 kg)       General/Constitutional:   Alert and oriented x 3, no acute distress  HEENT:   normocephalic, atraumatic, moist mucous membranes  Neck:   No JVD or carotid bruits bilaterally  Cardiovascular:   IRIR, tachycardic, no rub/gallop appreciated  Pulmonary:   clear to auscultation bilaterally, no respiratory distress  Abdomen:   soft, non-tender, non-distended  Ext:   2+ LE edema bilaterally  Skin:  warm and dry, no obvious rashes seen  Neuro:   no obvious sensory or motor deficits  Psychiatric:   normal mood and affect      ECG:   Atrial fibrillation   PRWP  Nonspecific ST and/or T wave abnormalities  Heart rate 121 BPM    Data Review:   No results found for: CHOL  No results found for: TRIG  No results found for: HDL  No results found for: LDLCHOLESTEROL, LDLCALC  No results found for: LABVLDL, VLDL  No results found for: [facility]     Lab Results   Component Value Date/Time     02/02/2023 07:15 AM     02/01/2023 02:49 AM     01/31/2023 01:16 PM    K 4.1 02/06/2023 06:02 AM    K 3.9 02/05/2023 05:28 AM    K 3.8 02/04/2023 05:52 AM     02/02/2023 07:15 AM     02/01/2023 02:49 AM     01/31/2023 01:16 PM    CO2 20 02/02/2023 07:15 AM    CO2 23 02/01/2023 02:49 AM    CO2 23 01/31/2023 01:16 PM    BUN 11 02/02/2023 07:15 AM    BUN 12 02/01/2023 02:49 AM    BUN 13 01/31/2023 01:16 PM    CREATININE 0.80 02/02/2023 07:15 AM    CREATININE 0.80 02/01/2023 02:49 AM    CREATININE 0.80 01/31/2023 01:16 PM    GLUCOSE 128 02/02/2023 07:15 AM    GLUCOSE 102 02/01/2023 02:49 AM    GLUCOSE 112 01/31/2023 01:16 PM    CALCIUM 8.4 02/02/2023 07:15 AM    CALCIUM 7.9 02/01/2023 02:49 AM    CALCIUM 8.3 01/31/2023 01:16 PM         Lab Results   Component Value Date    ALT 16 01/27/2023    ALT 18 10/10/2020    AST 9 (L) 01/27/2023    AST 21 10/10/2020        Assessment/Plan:   1. Acute HFrEF (heart failure with reduced ejection fraction) ([facility] [address]  - Patient with underlying ICM and now with AF with RVR  - Direct admission for management/diuresis  - Currently on Coreg  - Consider SGLT2i, RASi and MRA prior to discharge    2. Hx of CABG  - Continue with Lipitor and baby aspirin daily  - Recommend a lipid panel prior to discharge  - Currently on Coreg    3. Screening for lipid disorders  - Recommend a lipid profile prior to discharge    4.  Atrial fibrillation with RVR (HCC)  - [id number] equals 4  - Duration unknown or >48 hours (patient reports tachycardia for >48 hours)  - Direct admission for a rate control strategy up front followed by consideration of a rhythm control strategy with YAQUELIN +/- DCCV or AAD  - Obtain a CBC and start IV UFH pending CBC for stroke prophylaxis  - Currently on Coreg  - Obtain TFTs    F/U: Post hospitalization    Le Lozoya MD

## 2023-02-13 ENCOUNTER — HOSPITAL ENCOUNTER (INPATIENT)
Age: 73
LOS: 2 days | Discharge: HOME | DRG: 308 | End: 2023-02-15
Attending: INTERNAL MEDICINE | Admitting: INTERNAL MEDICINE
Payer: MEDICARE

## 2023-02-13 ENCOUNTER — TELEPHONE (OUTPATIENT)
Dept: CARDIOLOGY CLINIC | Age: 73
End: 2023-02-13

## 2023-02-13 ENCOUNTER — OFFICE VISIT (OUTPATIENT)
Dept: CARDIOLOGY CLINIC | Age: 73
End: 2023-02-13
Payer: MEDICARE

## 2023-02-13 ENCOUNTER — APPOINTMENT (OUTPATIENT)
Dept: GENERAL RADIOLOGY | Age: 73
DRG: 308 | End: 2023-02-13
Attending: INTERNAL MEDICINE
Payer: MEDICARE

## 2023-02-13 VITALS
WEIGHT: 209.2 LBS | DIASTOLIC BLOOD PRESSURE: 84 MMHG | BODY MASS INDEX: 35.71 KG/M2 | HEIGHT: 64 IN | HEART RATE: 88 BPM | SYSTOLIC BLOOD PRESSURE: 138 MMHG

## 2023-02-13 DIAGNOSIS — I50.21 ACUTE HFREF (HEART FAILURE WITH REDUCED EJECTION FRACTION) (HCC): Primary | ICD-10-CM

## 2023-02-13 DIAGNOSIS — I48.91 ATRIAL FIBRILLATION WITH RVR (HCC): ICD-10-CM

## 2023-02-13 DIAGNOSIS — I48.91 ATRIAL FIBRILLATION (HCC): ICD-10-CM

## 2023-02-13 DIAGNOSIS — Z95.1 HX OF CABG: ICD-10-CM

## 2023-02-13 DIAGNOSIS — Z13.220 SCREENING FOR LIPID DISORDERS: ICD-10-CM

## 2023-02-13 LAB
ANION GAP SERPL CALC-SCNC: 7 MMOL/L (ref 2–11)
APTT PPP: 29.9 SEC (ref 24.5–34.2)
BASOPHILS # BLD: 0.1 K/UL (ref 0–0.2)
BASOPHILS NFR BLD: 1 % (ref 0–2)
BUN SERPL-MCNC: 11 MG/DL (ref 8–23)
CALCIUM SERPL-MCNC: 9 MG/DL (ref 8.3–10.4)
CHLORIDE SERPL-SCNC: 109 MMOL/L (ref 101–110)
CO2 SERPL-SCNC: 25 MMOL/L (ref 21–32)
CREAT SERPL-MCNC: 0.9 MG/DL (ref 0.6–1)
DIFFERENTIAL METHOD BLD: ABNORMAL
EKG ATRIAL RATE: 122 BPM
EKG DIAGNOSIS: NORMAL
EKG Q-T INTERVAL: 328 MS
EKG QRS DURATION: 88 MS
EKG QTC CALCULATION (BAZETT): 463 MS
EKG R AXIS: 14 DEGREES
EKG T AXIS: 181 DEGREES
EKG VENTRICULAR RATE: 120 BPM
EOSINOPHIL # BLD: 0.2 K/UL (ref 0–0.8)
EOSINOPHIL NFR BLD: 3 % (ref 0.5–7.8)
ERYTHROCYTE [DISTWIDTH] IN BLOOD BY AUTOMATED COUNT: 15.4 % (ref 11.9–14.6)
GLUCOSE SERPL-MCNC: 115 MG/DL (ref 65–100)
HCT VFR BLD AUTO: 30.9 % (ref 35.8–46.3)
HGB BLD-MCNC: 9.6 G/DL (ref 11.7–15.4)
IMM GRANULOCYTES # BLD AUTO: 0 K/UL (ref 0–0.5)
IMM GRANULOCYTES NFR BLD AUTO: 1 % (ref 0–5)
INR PPP: 1.1
LYMPHOCYTES # BLD: 1.2 K/UL (ref 0.5–4.6)
LYMPHOCYTES NFR BLD: 19 % (ref 13–44)
MCH RBC QN AUTO: 27.4 PG (ref 26.1–32.9)
MCHC RBC AUTO-ENTMCNC: 31.1 G/DL (ref 31.4–35)
MCV RBC AUTO: 88.3 FL (ref 82–102)
MONOCYTES # BLD: 0.6 K/UL (ref 0.1–1.3)
MONOCYTES NFR BLD: 10 % (ref 4–12)
NEUTS SEG # BLD: 4.5 K/UL (ref 1.7–8.2)
NEUTS SEG NFR BLD: 66 % (ref 43–78)
NRBC # BLD: 0 K/UL (ref 0–0.2)
PLATELET # BLD AUTO: 411 K/UL (ref 150–450)
PMV BLD AUTO: 9.4 FL (ref 9.4–12.3)
POTASSIUM SERPL-SCNC: 3.6 MMOL/L (ref 3.5–5.1)
PROTHROMBIN TIME: 15 SEC (ref 12.6–14.3)
RBC # BLD AUTO: 3.5 M/UL (ref 4.05–5.2)
SODIUM SERPL-SCNC: 141 MMOL/L (ref 133–143)
UFH PPP CHRO-ACNC: 0.21 IU/ML (ref 0.3–0.7)
WBC # BLD AUTO: 6.6 K/UL (ref 4.3–11.1)

## 2023-02-13 PROCEDURE — 6360000002 HC RX W HCPCS: Performed by: NURSE PRACTITIONER

## 2023-02-13 PROCEDURE — 2500000003 HC RX 250 WO HCPCS: Performed by: NURSE PRACTITIONER

## 2023-02-13 PROCEDURE — 80048 BASIC METABOLIC PNL TOTAL CA: CPT

## 2023-02-13 PROCEDURE — 85025 COMPLETE CBC W/AUTO DIFF WBC: CPT

## 2023-02-13 PROCEDURE — 93000 ELECTROCARDIOGRAM COMPLETE: CPT | Performed by: INTERNAL MEDICINE

## 2023-02-13 PROCEDURE — 85730 THROMBOPLASTIN TIME PARTIAL: CPT

## 2023-02-13 PROCEDURE — 85610 PROTHROMBIN TIME: CPT

## 2023-02-13 PROCEDURE — 71045 X-RAY EXAM CHEST 1 VIEW: CPT

## 2023-02-13 PROCEDURE — 36415 COLL VENOUS BLD VENIPUNCTURE: CPT

## 2023-02-13 PROCEDURE — 99223 1ST HOSP IP/OBS HIGH 75: CPT | Performed by: INTERNAL MEDICINE

## 2023-02-13 PROCEDURE — 6370000000 HC RX 637 (ALT 250 FOR IP): Performed by: NURSE PRACTITIONER

## 2023-02-13 PROCEDURE — 85520 HEPARIN ASSAY: CPT

## 2023-02-13 PROCEDURE — 1100000003 HC PRIVATE W/ TELEMETRY

## 2023-02-13 PROCEDURE — 2580000003 HC RX 258: Performed by: NURSE PRACTITIONER

## 2023-02-13 RX ORDER — HEPARIN SODIUM 1000 [USP'U]/ML
4000 INJECTION, SOLUTION INTRAVENOUS; SUBCUTANEOUS PRN
Status: DISCONTINUED | OUTPATIENT
Start: 2023-02-13 | End: 2023-02-14 | Stop reason: SDUPTHER

## 2023-02-13 RX ORDER — ASPIRIN 81 MG/1
81 TABLET ORAL DAILY
Status: DISCONTINUED | OUTPATIENT
Start: 2023-02-13 | End: 2023-02-15 | Stop reason: HOSPADM

## 2023-02-13 RX ORDER — SODIUM CHLORIDE 9 MG/ML
INJECTION, SOLUTION INTRAVENOUS PRN
Status: DISCONTINUED | OUTPATIENT
Start: 2023-02-13 | End: 2023-02-15 | Stop reason: HOSPADM

## 2023-02-13 RX ORDER — ONDANSETRON 4 MG/1
4 TABLET, ORALLY DISINTEGRATING ORAL EVERY 8 HOURS PRN
Status: DISCONTINUED | OUTPATIENT
Start: 2023-02-13 | End: 2023-02-15 | Stop reason: HOSPADM

## 2023-02-13 RX ORDER — POLYETHYLENE GLYCOL 3350 17 G/17G
17 POWDER, FOR SOLUTION ORAL DAILY PRN
Status: DISCONTINUED | OUTPATIENT
Start: 2023-02-13 | End: 2023-02-15 | Stop reason: HOSPADM

## 2023-02-13 RX ORDER — ACETAMINOPHEN 325 MG/1
650 TABLET ORAL EVERY 6 HOURS PRN
Status: DISCONTINUED | OUTPATIENT
Start: 2023-02-13 | End: 2023-02-15 | Stop reason: HOSPADM

## 2023-02-13 RX ORDER — SODIUM CHLORIDE 0.9 % (FLUSH) 0.9 %
5-40 SYRINGE (ML) INJECTION EVERY 12 HOURS SCHEDULED
Status: DISCONTINUED | OUTPATIENT
Start: 2023-02-13 | End: 2023-02-15 | Stop reason: HOSPADM

## 2023-02-13 RX ORDER — ACETAMINOPHEN 650 MG/1
650 SUPPOSITORY RECTAL EVERY 6 HOURS PRN
Status: DISCONTINUED | OUTPATIENT
Start: 2023-02-13 | End: 2023-02-15 | Stop reason: HOSPADM

## 2023-02-13 RX ORDER — CARVEDILOL 3.12 MG/1
3.12 TABLET ORAL 2 TIMES DAILY WITH MEALS
Status: DISCONTINUED | OUTPATIENT
Start: 2023-02-13 | End: 2023-02-15

## 2023-02-13 RX ORDER — ATORVASTATIN CALCIUM 80 MG/1
80 TABLET, FILM COATED ORAL NIGHTLY
Status: DISCONTINUED | OUTPATIENT
Start: 2023-02-13 | End: 2023-02-15 | Stop reason: HOSPADM

## 2023-02-13 RX ORDER — FUROSEMIDE 10 MG/ML
40 INJECTION INTRAMUSCULAR; INTRAVENOUS 2 TIMES DAILY
Status: DISCONTINUED | OUTPATIENT
Start: 2023-02-13 | End: 2023-02-14

## 2023-02-13 RX ORDER — DILTIAZEM HYDROCHLORIDE 5 MG/ML
10 INJECTION INTRAVENOUS ONCE
Status: COMPLETED | OUTPATIENT
Start: 2023-02-13 | End: 2023-02-13

## 2023-02-13 RX ORDER — LOSARTAN POTASSIUM 25 MG/1
25 TABLET ORAL DAILY
Qty: 90 TABLET | Refills: 3 | Status: SHIPPED | OUTPATIENT
Start: 2023-02-13 | End: 2023-02-13 | Stop reason: ALTCHOICE

## 2023-02-13 RX ORDER — ONDANSETRON 2 MG/ML
4 INJECTION INTRAMUSCULAR; INTRAVENOUS EVERY 6 HOURS PRN
Status: DISCONTINUED | OUTPATIENT
Start: 2023-02-13 | End: 2023-02-15 | Stop reason: HOSPADM

## 2023-02-13 RX ORDER — HEPARIN SODIUM 1000 [USP'U]/ML
2000 INJECTION, SOLUTION INTRAVENOUS; SUBCUTANEOUS PRN
Status: DISCONTINUED | OUTPATIENT
Start: 2023-02-13 | End: 2023-02-14 | Stop reason: SDUPTHER

## 2023-02-13 RX ORDER — FUROSEMIDE 40 MG/1
40 TABLET ORAL AS NEEDED
Qty: 90 TABLET | Refills: 3 | Status: SHIPPED | OUTPATIENT
Start: 2023-02-13 | End: 2023-02-13

## 2023-02-13 RX ORDER — HEPARIN SODIUM 10000 [USP'U]/100ML
5-30 INJECTION, SOLUTION INTRAVENOUS CONTINUOUS
Status: DISCONTINUED | OUTPATIENT
Start: 2023-02-13 | End: 2023-02-14

## 2023-02-13 RX ORDER — HEPARIN SODIUM 1000 [USP'U]/ML
4000 INJECTION, SOLUTION INTRAVENOUS; SUBCUTANEOUS ONCE
Status: COMPLETED | OUTPATIENT
Start: 2023-02-13 | End: 2023-02-13

## 2023-02-13 RX ORDER — DULOXETIN HYDROCHLORIDE 60 MG/1
60 CAPSULE, DELAYED RELEASE ORAL DAILY
Status: DISCONTINUED | OUTPATIENT
Start: 2023-02-13 | End: 2023-02-15 | Stop reason: HOSPADM

## 2023-02-13 RX ORDER — SODIUM CHLORIDE 0.9 % (FLUSH) 0.9 %
5-40 SYRINGE (ML) INJECTION PRN
Status: DISCONTINUED | OUTPATIENT
Start: 2023-02-13 | End: 2023-02-15 | Stop reason: HOSPADM

## 2023-02-13 RX ADMIN — CARVEDILOL 3.12 MG: 3.12 TABLET, FILM COATED ORAL at 17:54

## 2023-02-13 RX ADMIN — HEPARIN SODIUM 2000 UNITS: 1000 INJECTION INTRAVENOUS; SUBCUTANEOUS at 22:04

## 2023-02-13 RX ADMIN — HEPARIN SODIUM 10 UNITS/KG/HR: 10000 INJECTION, SOLUTION INTRAVENOUS at 14:39

## 2023-02-13 RX ADMIN — SODIUM CHLORIDE 10 MG/HR: 900 INJECTION, SOLUTION INTRAVENOUS at 20:21

## 2023-02-13 RX ADMIN — DILTIAZEM HYDROCHLORIDE 10 MG: 5 INJECTION INTRAVENOUS at 10:32

## 2023-02-13 RX ADMIN — ATORVASTATIN CALCIUM 80 MG: 80 TABLET, FILM COATED ORAL at 20:21

## 2023-02-13 RX ADMIN — SODIUM CHLORIDE, PRESERVATIVE FREE 5 ML: 5 INJECTION INTRAVENOUS at 20:24

## 2023-02-13 RX ADMIN — FUROSEMIDE 40 MG: 10 INJECTION, SOLUTION INTRAMUSCULAR; INTRAVENOUS at 17:54

## 2023-02-13 RX ADMIN — SODIUM CHLORIDE 5 MG/HR: 900 INJECTION, SOLUTION INTRAVENOUS at 10:32

## 2023-02-13 RX ADMIN — HEPARIN SODIUM 4000 UNITS: 1000 INJECTION INTRAVENOUS; SUBCUTANEOUS at 14:39

## 2023-02-13 RX ADMIN — SODIUM CHLORIDE, PRESERVATIVE FREE 10 ML: 5 INJECTION INTRAVENOUS at 10:34

## 2023-02-13 NOTE — PROGRESS NOTES
Spiritual Care Visit, initial visit (this admission). Visited with patient and her  at bedside. Prayed for patient's healing and health. Patient had been discharged after surgery;   her cardiologist had her to return to hospital after noting A Fib during  follow up office visit. Visit by Elizabeth Shea, Staff .  Meche., Th.B., B.A.

## 2023-02-13 NOTE — PROGRESS NOTES
Pt sacrum is clean dry and intact with no redness or breakdown. Pt heels are red, but are intact with no breakdown. Pt has BLE edema 3+ pitting with redness.     Skin verified with Denia OLIVER.

## 2023-02-14 ENCOUNTER — HOSPITAL ENCOUNTER (INPATIENT)
Dept: CARDIAC CATH/INVASIVE PROCEDURES | Age: 73
Discharge: HOME | DRG: 308 | End: 2023-02-16
Attending: INTERNAL MEDICINE
Payer: MEDICARE

## 2023-02-14 VITALS
HEART RATE: 75 BPM | OXYGEN SATURATION: 97 % | DIASTOLIC BLOOD PRESSURE: 62 MMHG | RESPIRATION RATE: 18 BRPM | SYSTOLIC BLOOD PRESSURE: 138 MMHG

## 2023-02-14 LAB
ANION GAP SERPL CALC-SCNC: 6 MMOL/L (ref 2–11)
BASOPHILS # BLD: 0.1 K/UL (ref 0–0.2)
BASOPHILS NFR BLD: 1 % (ref 0–2)
BUN SERPL-MCNC: 9 MG/DL (ref 8–23)
CALCIUM SERPL-MCNC: 8.3 MG/DL (ref 8.3–10.4)
CHLORIDE SERPL-SCNC: 107 MMOL/L (ref 101–110)
CHOLEST SERPL-MCNC: 80 MG/DL
CO2 SERPL-SCNC: 28 MMOL/L (ref 21–32)
CREAT SERPL-MCNC: 0.7 MG/DL (ref 0.6–1)
DIFFERENTIAL METHOD BLD: ABNORMAL
EOSINOPHIL # BLD: 0.3 K/UL (ref 0–0.8)
EOSINOPHIL NFR BLD: 4 % (ref 0.5–7.8)
ERYTHROCYTE [DISTWIDTH] IN BLOOD BY AUTOMATED COUNT: 15.5 % (ref 11.9–14.6)
GLUCOSE SERPL-MCNC: 123 MG/DL (ref 65–100)
HCT VFR BLD AUTO: 30.5 % (ref 35.8–46.3)
HDLC SERPL-MCNC: 38 MG/DL (ref 40–60)
HDLC SERPL: 2.1
HGB BLD-MCNC: 9.6 G/DL (ref 11.7–15.4)
IMM GRANULOCYTES # BLD AUTO: 0 K/UL (ref 0–0.5)
IMM GRANULOCYTES NFR BLD AUTO: 0 % (ref 0–5)
LDLC SERPL CALC-MCNC: 26.4 MG/DL
LYMPHOCYTES # BLD: 1.9 K/UL (ref 0.5–4.6)
LYMPHOCYTES NFR BLD: 25 % (ref 13–44)
MCH RBC QN AUTO: 27.3 PG (ref 26.1–32.9)
MCHC RBC AUTO-ENTMCNC: 31.5 G/DL (ref 31.4–35)
MCV RBC AUTO: 86.6 FL (ref 82–102)
MONOCYTES # BLD: 0.7 K/UL (ref 0.1–1.3)
MONOCYTES NFR BLD: 9 % (ref 4–12)
NEUTS SEG # BLD: 4.7 K/UL (ref 1.7–8.2)
NEUTS SEG NFR BLD: 61 % (ref 43–78)
NRBC # BLD: 0 K/UL (ref 0–0.2)
PLATELET # BLD AUTO: 464 K/UL (ref 150–450)
PMV BLD AUTO: 9.7 FL (ref 9.4–12.3)
POTASSIUM SERPL-SCNC: 3.9 MMOL/L (ref 3.5–5.1)
RBC # BLD AUTO: 3.52 M/UL (ref 4.05–5.2)
SODIUM SERPL-SCNC: 141 MMOL/L (ref 133–143)
T4 FREE SERPL-MCNC: 1.2 NG/DL (ref 0.78–1.46)
TRIGL SERPL-MCNC: 78 MG/DL (ref 35–150)
TSH, 3RD GENERATION: 6.68 UIU/ML (ref 0.36–3.74)
UFH PPP CHRO-ACNC: 0.26 IU/ML (ref 0.3–0.7)
UFH PPP CHRO-ACNC: 0.29 IU/ML (ref 0.3–0.7)
UFH PPP CHRO-ACNC: 0.38 IU/ML (ref 0.3–0.7)
VLDLC SERPL CALC-MCNC: 15.6 MG/DL (ref 6–23)
WBC # BLD AUTO: 7.7 K/UL (ref 4.3–11.1)

## 2023-02-14 PROCEDURE — 93312 ECHO TRANSESOPHAGEAL: CPT | Performed by: INTERNAL MEDICINE

## 2023-02-14 PROCEDURE — 2580000003 HC RX 258: Performed by: INTERNAL MEDICINE

## 2023-02-14 PROCEDURE — 93005 ELECTROCARDIOGRAM TRACING: CPT | Performed by: INTERNAL MEDICINE

## 2023-02-14 PROCEDURE — 84443 ASSAY THYROID STIM HORMONE: CPT

## 2023-02-14 PROCEDURE — 6370000000 HC RX 637 (ALT 250 FOR IP): Performed by: NURSE PRACTITIONER

## 2023-02-14 PROCEDURE — 6360000002 HC RX W HCPCS: Performed by: NURSE PRACTITIONER

## 2023-02-14 PROCEDURE — 92960 CARDIOVERSION ELECTRIC EXT: CPT

## 2023-02-14 PROCEDURE — 80061 LIPID PANEL: CPT

## 2023-02-14 PROCEDURE — 5A2204Z RESTORATION OF CARDIAC RHYTHM, SINGLE: ICD-10-PCS | Performed by: INTERNAL MEDICINE

## 2023-02-14 PROCEDURE — 85025 COMPLETE CBC W/AUTO DIFF WBC: CPT

## 2023-02-14 PROCEDURE — 85520 HEPARIN ASSAY: CPT

## 2023-02-14 PROCEDURE — 1100000003 HC PRIVATE W/ TELEMETRY

## 2023-02-14 PROCEDURE — 99152 MOD SED SAME PHYS/QHP 5/>YRS: CPT | Performed by: INTERNAL MEDICINE

## 2023-02-14 PROCEDURE — 36415 COLL VENOUS BLD VENIPUNCTURE: CPT

## 2023-02-14 PROCEDURE — 2580000003 HC RX 258: Performed by: NURSE PRACTITIONER

## 2023-02-14 PROCEDURE — 93325 DOPPLER ECHO COLOR FLOW MAPG: CPT | Performed by: INTERNAL MEDICINE

## 2023-02-14 PROCEDURE — B245ZZ4 ULTRASONOGRAPHY OF LEFT HEART, TRANSESOPHAGEAL: ICD-10-PCS | Performed by: INTERNAL MEDICINE

## 2023-02-14 PROCEDURE — 6360000002 HC RX W HCPCS: Performed by: INTERNAL MEDICINE

## 2023-02-14 PROCEDURE — 2500000003 HC RX 250 WO HCPCS: Performed by: NURSE PRACTITIONER

## 2023-02-14 PROCEDURE — 99152 MOD SED SAME PHYS/QHP 5/>YRS: CPT

## 2023-02-14 PROCEDURE — 93320 DOPPLER ECHO COMPLETE: CPT

## 2023-02-14 PROCEDURE — 92960 CARDIOVERSION ELECTRIC EXT: CPT | Performed by: INTERNAL MEDICINE

## 2023-02-14 PROCEDURE — 84439 ASSAY OF FREE THYROXINE: CPT

## 2023-02-14 PROCEDURE — 6370000000 HC RX 637 (ALT 250 FOR IP): Performed by: INTERNAL MEDICINE

## 2023-02-14 PROCEDURE — 80048 BASIC METABOLIC PNL TOTAL CA: CPT

## 2023-02-14 RX ORDER — SPIRONOLACTONE 25 MG/1
12.5 TABLET ORAL DAILY
Status: DISCONTINUED | OUTPATIENT
Start: 2023-02-15 | End: 2023-02-15 | Stop reason: HOSPADM

## 2023-02-14 RX ORDER — MIDAZOLAM HYDROCHLORIDE 2 MG/2ML
INJECTION, SOLUTION INTRAMUSCULAR; INTRAVENOUS
Status: COMPLETED | OUTPATIENT
Start: 2023-02-14 | End: 2023-02-14

## 2023-02-14 RX ORDER — FUROSEMIDE 40 MG/1
40 TABLET ORAL DAILY
Status: DISCONTINUED | OUTPATIENT
Start: 2023-02-15 | End: 2023-02-15 | Stop reason: HOSPADM

## 2023-02-14 RX ORDER — FENTANYL CITRATE 50 UG/ML
INJECTION, SOLUTION INTRAMUSCULAR; INTRAVENOUS
Status: COMPLETED | OUTPATIENT
Start: 2023-02-14 | End: 2023-02-14

## 2023-02-14 RX ORDER — LOSARTAN POTASSIUM 25 MG/1
12.5 TABLET ORAL DAILY
Status: DISCONTINUED | OUTPATIENT
Start: 2023-02-15 | End: 2023-02-15

## 2023-02-14 RX ADMIN — DULOXETINE HYDROCHLORIDE 60 MG: 60 CAPSULE, DELAYED RELEASE ORAL at 08:39

## 2023-02-14 RX ADMIN — APIXABAN 5 MG: 5 TABLET, FILM COATED ORAL at 20:30

## 2023-02-14 RX ADMIN — ASPIRIN 81 MG: 81 TABLET, COATED ORAL at 08:38

## 2023-02-14 RX ADMIN — FENTANYL CITRATE 25 MCG: 50 INJECTION, SOLUTION INTRAMUSCULAR; INTRAVENOUS at 15:48

## 2023-02-14 RX ADMIN — AMIODARONE HYDROCHLORIDE 150 MG: 50 INJECTION, SOLUTION INTRAVENOUS at 16:33

## 2023-02-14 RX ADMIN — HEPARIN SODIUM 2000 UNITS: 1000 INJECTION INTRAVENOUS; SUBCUTANEOUS at 06:41

## 2023-02-14 RX ADMIN — MIDAZOLAM HYDROCHLORIDE 2 MG: 1 INJECTION, SOLUTION INTRAMUSCULAR; INTRAVENOUS at 15:48

## 2023-02-14 RX ADMIN — FUROSEMIDE 40 MG: 10 INJECTION, SOLUTION INTRAMUSCULAR; INTRAVENOUS at 17:36

## 2023-02-14 RX ADMIN — HEPARIN SODIUM 14 UNITS/KG/HR: 10000 INJECTION, SOLUTION INTRAVENOUS at 11:32

## 2023-02-14 RX ADMIN — SODIUM CHLORIDE, PRESERVATIVE FREE 5 ML: 5 INJECTION INTRAVENOUS at 20:26

## 2023-02-14 RX ADMIN — SODIUM CHLORIDE 5 MG/HR: 900 INJECTION, SOLUTION INTRAVENOUS at 06:46

## 2023-02-14 RX ADMIN — SODIUM CHLORIDE, PRESERVATIVE FREE 10 ML: 5 INJECTION INTRAVENOUS at 11:17

## 2023-02-14 RX ADMIN — AMIODARONE HYDROCHLORIDE 1 MG/MIN: 50 INJECTION, SOLUTION INTRAVENOUS at 16:41

## 2023-02-14 RX ADMIN — MIDAZOLAM HYDROCHLORIDE 1 MG: 1 INJECTION, SOLUTION INTRAMUSCULAR; INTRAVENOUS at 15:50

## 2023-02-14 RX ADMIN — AMIODARONE HYDROCHLORIDE 1 MG/MIN: 50 INJECTION, SOLUTION INTRAVENOUS at 19:17

## 2023-02-14 RX ADMIN — FUROSEMIDE 40 MG: 10 INJECTION, SOLUTION INTRAMUSCULAR; INTRAVENOUS at 08:40

## 2023-02-14 RX ADMIN — ATORVASTATIN CALCIUM 80 MG: 80 TABLET, FILM COATED ORAL at 20:24

## 2023-02-14 RX ADMIN — CARVEDILOL 3.12 MG: 3.12 TABLET, FILM COATED ORAL at 08:39

## 2023-02-14 RX ADMIN — CARVEDILOL 3.12 MG: 3.12 TABLET, FILM COATED ORAL at 17:36

## 2023-02-14 RX ADMIN — SODIUM CHLORIDE 7.5 MG/HR: 900 INJECTION, SOLUTION INTRAVENOUS at 04:33

## 2023-02-14 RX ADMIN — AMIODARONE HYDROCHLORIDE 150 MG: 50 INJECTION, SOLUTION INTRAVENOUS at 16:28

## 2023-02-14 ASSESSMENT — PAIN SCALES - GENERAL: PAINLEVEL_OUTOF10: 0

## 2023-02-14 NOTE — PROGRESS NOTES
YAQUELIN/CVN by Dr Mehran King  II ASA II Mallampati  3mg versed  100mcg fentanyl  Viscous Solution given at 1545  1 shock at 360 joules synced  Post cardioversion rhythm NSR  Pt tolerated well.

## 2023-02-14 NOTE — PROGRESS NOTES
TRANSFER - OUT REPORT:    Verbal report given to Kiara Zambrano on [facility]  being transferred to 3rd floor for routine post-op       Report consisted of patient's Situation, Background, Assessment and   Recommendations(SBAR). Information from the following report(s) Nurse Handoff Report was reviewed with the receiving nurse. [place] Assessment: No data recorded  Lines:   Peripheral IV 02/13/23 Right Antecubital (Active)   Site Assessment Clean, dry & intact 02/14/23 1132   Line Status Infusing 02/14/23 1530 [address] Connections checked and tightened 02/14/23 1132   Phlebitis Assessment No symptoms 02/14/23 1132   Infiltration Assessment 0 02/14/23 1132   Alcohol Cap Used Yes 02/14/23 1132   Dressing Status Clean, dry & intact 02/14/23 1132   Dressing Type Transparent 02/14/23 1132   Dressing Intervention New 02/13/23 0930        Opportunity for questions and clarification was provided.       Patient transported with:  Monitor, Patient-specific medications from Pharmacy, and Registered Nurse

## 2023-02-14 NOTE — PROGRESS NOTES
0: RN spoke with Rabia Rausch from lab and verified that the pt's heparin Xa ordered for 0400 is still on their list to be drawn and that the Xa drawn at 0200 was drawn too early.

## 2023-02-15 VITALS
SYSTOLIC BLOOD PRESSURE: 152 MMHG | RESPIRATION RATE: 12 BRPM | BODY MASS INDEX: 34.57 KG/M2 | HEART RATE: 69 BPM | TEMPERATURE: 97.7 F | DIASTOLIC BLOOD PRESSURE: 70 MMHG | WEIGHT: 201.4 LBS | OXYGEN SATURATION: 96 %

## 2023-02-15 PROBLEM — I50.21 ACUTE HFREF (HEART FAILURE WITH REDUCED EJECTION FRACTION) (HCC): Status: RESOLVED | Noted: 2023-02-13 | Resolved: 2023-02-15

## 2023-02-15 PROBLEM — I48.91 ATRIAL FIBRILLATION WITH RAPID VENTRICULAR RESPONSE (HCC): Status: RESOLVED | Noted: 2023-02-13 | Resolved: 2023-02-15

## 2023-02-15 LAB
ANION GAP SERPL CALC-SCNC: 5 MMOL/L (ref 2–11)
BASOPHILS # BLD: 0.1 K/UL (ref 0–0.2)
BASOPHILS NFR BLD: 1 % (ref 0–2)
BUN SERPL-MCNC: 10 MG/DL (ref 8–23)
CALCIUM SERPL-MCNC: 8.8 MG/DL (ref 8.3–10.4)
CHLORIDE SERPL-SCNC: 106 MMOL/L (ref 101–110)
CO2 SERPL-SCNC: 28 MMOL/L (ref 21–32)
CREAT SERPL-MCNC: 0.9 MG/DL (ref 0.6–1)
DIFFERENTIAL METHOD BLD: ABNORMAL
ECHO BSA: 2.07 M2
EKG ATRIAL RATE: 78 BPM
EKG DIAGNOSIS: NORMAL
EKG P AXIS: 39 DEGREES
EKG P-R INTERVAL: 158 MS
EKG Q-T INTERVAL: 432 MS
EKG QRS DURATION: 88 MS
EKG QTC CALCULATION (BAZETT): 492 MS
EKG R AXIS: -16 DEGREES
EKG T AXIS: 118 DEGREES
EKG VENTRICULAR RATE: 78 BPM
EOSINOPHIL # BLD: 0.3 K/UL (ref 0–0.8)
EOSINOPHIL NFR BLD: 4 % (ref 0.5–7.8)
ERYTHROCYTE [DISTWIDTH] IN BLOOD BY AUTOMATED COUNT: 15.3 % (ref 11.9–14.6)
GLUCOSE SERPL-MCNC: 126 MG/DL (ref 65–100)
HCT VFR BLD AUTO: 31.3 % (ref 35.8–46.3)
HGB BLD-MCNC: 9.7 G/DL (ref 11.7–15.4)
IMM GRANULOCYTES # BLD AUTO: 0 K/UL (ref 0–0.5)
IMM GRANULOCYTES NFR BLD AUTO: 0 % (ref 0–5)
LV EF: 43 %
LVEF MODALITY: NORMAL
LYMPHOCYTES # BLD: 1.8 K/UL (ref 0.5–4.6)
LYMPHOCYTES NFR BLD: 21 % (ref 13–44)
MAGNESIUM SERPL-MCNC: 1.8 MG/DL (ref 1.8–2.4)
MCH RBC QN AUTO: 27.1 PG (ref 26.1–32.9)
MCHC RBC AUTO-ENTMCNC: 31 G/DL (ref 31.4–35)
MCV RBC AUTO: 87.4 FL (ref 82–102)
MONOCYTES # BLD: 1 K/UL (ref 0.1–1.3)
MONOCYTES NFR BLD: 11 % (ref 4–12)
NEUTS SEG # BLD: 5.4 K/UL (ref 1.7–8.2)
NEUTS SEG NFR BLD: 63 % (ref 43–78)
NRBC # BLD: 0 K/UL (ref 0–0.2)
PLATELET # BLD AUTO: 525 K/UL (ref 150–450)
PMV BLD AUTO: 9.7 FL (ref 9.4–12.3)
POTASSIUM SERPL-SCNC: 3.3 MMOL/L (ref 3.5–5.1)
RBC # BLD AUTO: 3.58 M/UL (ref 4.05–5.2)
SODIUM SERPL-SCNC: 139 MMOL/L (ref 133–143)
WBC # BLD AUTO: 8.6 K/UL (ref 4.3–11.1)

## 2023-02-15 PROCEDURE — 6370000000 HC RX 637 (ALT 250 FOR IP): Performed by: PHYSICIAN ASSISTANT

## 2023-02-15 PROCEDURE — 6370000000 HC RX 637 (ALT 250 FOR IP): Performed by: NURSE PRACTITIONER

## 2023-02-15 PROCEDURE — 83735 ASSAY OF MAGNESIUM: CPT

## 2023-02-15 PROCEDURE — 80048 BASIC METABOLIC PNL TOTAL CA: CPT

## 2023-02-15 PROCEDURE — 36415 COLL VENOUS BLD VENIPUNCTURE: CPT

## 2023-02-15 PROCEDURE — 2580000003 HC RX 258: Performed by: NURSE PRACTITIONER

## 2023-02-15 PROCEDURE — 6370000000 HC RX 637 (ALT 250 FOR IP): Performed by: INTERNAL MEDICINE

## 2023-02-15 PROCEDURE — 6360000002 HC RX W HCPCS: Performed by: INTERNAL MEDICINE

## 2023-02-15 PROCEDURE — 85025 COMPLETE CBC W/AUTO DIFF WBC: CPT

## 2023-02-15 PROCEDURE — 2580000003 HC RX 258: Performed by: INTERNAL MEDICINE

## 2023-02-15 RX ORDER — FUROSEMIDE 40 MG/1
40 TABLET ORAL DAILY
Qty: 60 TABLET | Refills: 3 | Status: SHIPPED | OUTPATIENT
Start: 2023-02-16

## 2023-02-15 RX ORDER — LOSARTAN POTASSIUM 25 MG/1
25 TABLET ORAL DAILY
Status: DISCONTINUED | OUTPATIENT
Start: 2023-02-16 | End: 2023-02-15 | Stop reason: HOSPADM

## 2023-02-15 RX ORDER — SPIRONOLACTONE 25 MG/1
12.5 TABLET ORAL DAILY
Qty: 30 TABLET | Refills: 3 | Status: SHIPPED | OUTPATIENT
Start: 2023-02-16

## 2023-02-15 RX ORDER — LOSARTAN POTASSIUM 25 MG/1
25 TABLET ORAL DAILY
Qty: 30 TABLET | Refills: 3 | Status: SHIPPED | OUTPATIENT
Start: 2023-02-16

## 2023-02-15 RX ORDER — POTASSIUM CHLORIDE 20 MEQ/1
20 TABLET, EXTENDED RELEASE ORAL
Status: DISCONTINUED | OUTPATIENT
Start: 2023-02-15 | End: 2023-02-15 | Stop reason: HOSPADM

## 2023-02-15 RX ORDER — AMIODARONE HYDROCHLORIDE 200 MG/1
200 TABLET ORAL DAILY
Qty: 30 TABLET | Refills: 3 | Status: SHIPPED | OUTPATIENT
Start: 2023-03-01

## 2023-02-15 RX ORDER — CARVEDILOL 6.25 MG/1
6.25 TABLET ORAL 2 TIMES DAILY WITH MEALS
Status: DISCONTINUED | OUTPATIENT
Start: 2023-02-15 | End: 2023-02-15 | Stop reason: HOSPADM

## 2023-02-15 RX ORDER — AMIODARONE HYDROCHLORIDE 200 MG/1
200 TABLET ORAL DAILY
Status: DISCONTINUED | OUTPATIENT
Start: 2023-03-01 | End: 2023-02-15 | Stop reason: HOSPADM

## 2023-02-15 RX ORDER — AMIODARONE HYDROCHLORIDE 200 MG/1
200 TABLET ORAL 2 TIMES DAILY
Status: DISCONTINUED | OUTPATIENT
Start: 2023-02-15 | End: 2023-02-15 | Stop reason: HOSPADM

## 2023-02-15 RX ORDER — POTASSIUM CHLORIDE 20 MEQ/1
20 TABLET, EXTENDED RELEASE ORAL
Qty: 60 TABLET | Refills: 3 | Status: SHIPPED | OUTPATIENT
Start: 2023-02-16

## 2023-02-15 RX ORDER — POTASSIUM CHLORIDE 20 MEQ/1
40 TABLET, EXTENDED RELEASE ORAL ONCE
Status: DISCONTINUED | OUTPATIENT
Start: 2023-02-15 | End: 2023-02-15 | Stop reason: HOSPADM

## 2023-02-15 RX ORDER — AMIODARONE HYDROCHLORIDE 200 MG/1
200 TABLET ORAL 2 TIMES DAILY
Qty: 28 TABLET | Refills: 0 | Status: SHIPPED | OUTPATIENT
Start: 2023-02-15 | End: 2023-03-01

## 2023-02-15 RX ORDER — CARVEDILOL 6.25 MG/1
6.25 TABLET ORAL 2 TIMES DAILY WITH MEALS
Qty: 60 TABLET | Refills: 3 | Status: SHIPPED | OUTPATIENT
Start: 2023-02-15

## 2023-02-15 RX ADMIN — AMIODARONE HYDROCHLORIDE 1 MG/MIN: 50 INJECTION, SOLUTION INTRAVENOUS at 00:36

## 2023-02-15 RX ADMIN — LOSARTAN POTASSIUM 12.5 MG: 25 TABLET, FILM COATED ORAL at 08:25

## 2023-02-15 RX ADMIN — AMIODARONE HYDROCHLORIDE 200 MG: 200 TABLET ORAL at 11:15

## 2023-02-15 RX ADMIN — DULOXETINE HYDROCHLORIDE 60 MG: 60 CAPSULE, DELAYED RELEASE ORAL at 08:24

## 2023-02-15 RX ADMIN — AMIODARONE HYDROCHLORIDE 1 MG/MIN: 50 INJECTION, SOLUTION INTRAVENOUS at 09:42

## 2023-02-15 RX ADMIN — SODIUM CHLORIDE, PRESERVATIVE FREE 10 ML: 5 INJECTION INTRAVENOUS at 08:25

## 2023-02-15 RX ADMIN — SPIRONOLACTONE 12.5 MG: 25 TABLET ORAL at 08:24

## 2023-02-15 RX ADMIN — FUROSEMIDE 40 MG: 40 TABLET ORAL at 08:25

## 2023-02-15 RX ADMIN — CARVEDILOL 3.12 MG: 3.12 TABLET, FILM COATED ORAL at 08:25

## 2023-02-15 RX ADMIN — ASPIRIN 81 MG: 81 TABLET, COATED ORAL at 08:24

## 2023-02-15 RX ADMIN — POTASSIUM CHLORIDE 20 MEQ: 1500 TABLET, EXTENDED RELEASE ORAL at 09:43

## 2023-02-15 RX ADMIN — APIXABAN 5 MG: 5 TABLET, FILM COATED ORAL at 08:25

## 2023-02-15 ASSESSMENT — PAIN SCALES - GENERAL: PAINLEVEL_OUTOF10: 0

## 2023-02-15 NOTE — DISCHARGE SUMMARY
[facility] Cardiology Discharge Summary     Patient ID:  Laya Ocasio  [id number]  15 y.o.  1950    Admit date: 2/13/2023    Discharge date:  02/15/23    Admitting Physician: Bandar Boyce MD     Discharge Physician: FERNANDO Weber CNP/Dr. Reshma Mitchell    Admission Diagnoses: Atrial fibrillation with rapid ventricular response [facility]) [I48.91]    Discharge Diagnoses:     Principal Problem (Resolved):    Atrial fibrillation with rapid ventricular response ([address]  Active Problems:    * No active hospital problems. *  Resolved Problems:    Acute HFrEF (heart failure with reduced ejection fraction) [facility])        Cardiology Procedures this admission:  YAQUELIN/Cardioversion  Consults: none    Hospital Course: Patient presented to the emergency department of [facility] with complaints of palpitations and shortness of breath. The patient was noted to be in atrial fibrillation with RVR on arrival.  A Cardizem bolus was given in the ER with good response. The patient was admitted to telemetry and Cardizem drip was continued. There were started on Eliquis for anticoagulation    The patient was monitored on telemetry. YAQUELIN/Cardioversion was planned. The patient underwent YAQUELIN that was negative for thrombus and then underwent successful cardioversion from atrial fibrillation to sinus rhythm. The patient felt much better back in sinus rhythm. The morning of 02/15/23 the patient was feeling much better and remained in sinus rhythm. The patient was seen and examined by Dr. Reshma Mitchell and was determined stable and ready for discharge home. The patient did well an diuresed 1.4 L this admission and will be sent home on BB, ARB, Lasix, and Aldactone. The patient was instructed on the importance of medication compliance and outpatient follow up. The patient will follow up with [facility] Dr Arslan Mosqueda. The patient will have a BMP in 1-2 weeks.     DISPOSITION: The patient is being discharged home in stable condition on a low saturated fat, low cholesterol and low salt diet. The patient is instructed to advance activities as tolerated to the limit of fatigue or shortness of breath. The patient is instructed to call the office or return to the ER for immediate evaluation for any severe shortness of breath, chest pain, prolonged palpitations, near syncope or syncope. Discharge Exam: BP (!) 152/70   Pulse 69   Temp 97.7 °F (36.5 °C) (Oral)   Resp 12   Wt 201 lb 6.4 oz (91.4 kg)   SpO2 96%   BMI 34.57 kg/m²   Patient has been seen by Dr. Liang Pulling: see his progress note for exam details.     Recent Results (from the past 24 hour(s))   Transesophageal echocardiogram (YAQUELIN) with possible cardioversion with contrast PRN    Collection Time: 02/14/23  3:59 PM   Result Value Ref Range    Body Surface Area 2.07 m2   EKG 12 Lead    Collection Time: 02/14/23  5:10 PM   Result Value Ref Range    Ventricular Rate 78 BPM    Atrial Rate 78 BPM    P-R Interval 158 ms    QRS Duration 88 ms    Q-T Interval 432 ms    QTc Calculation (Bazett) 492 ms    P Axis 39 degrees    R Axis -16 degrees    T Axis 118 degrees    Diagnosis Normal sinus rhythm    Anti-Xa, Unfractionated Heparin    Collection Time: 02/14/23  6:20 PM   Result Value Ref Range    Anti-XA Unfrac Heparin 0.26 (L) 0.3 - 0.7 IU/mL   Basic Metabolic Panel w/ Reflex to MG    Collection Time: 02/15/23  4:44 AM   Result Value Ref Range    Sodium 139 133 - 143 mmol/L    Potassium 3.3 (L) 3.5 - 5.1 mmol/L    Chloride 106 101 - 110 mmol/L    CO2 28 21 - 32 mmol/L    Anion Gap 5 2 - 11 mmol/L    Glucose 126 (H) 65 - 100 mg/dL    BUN 10 8 - 23 MG/DL    Creatinine 0.90 0.6 - 1.0 MG/DL    Est, Glom Filt Rate >60 >60 ml/min/1.73m2    Calcium 8.8 8.3 - 10.4 MG/DL   CBC with Auto Differential    Collection Time: 02/15/23  4:44 AM   Result Value Ref Range    WBC 8.6 4.3 - 11.1 K/uL    RBC 3.58 (L) 4.05 - 5.2 M/uL    Hemoglobin 9.7 (L) 11.7 - 15.4 g/dL    Hematocrit 31.3 (L) 35.8 - 46.3 %    MCV 87.4 82 - 102 FL    MCH 27.1 26.1 - 32.9 PG    MCHC 31.0 (L) 31.4 - 35.0 g/dL    RDW 15.3 (H) 11.9 - 14.6 %    Platelets 656 (H) 242 - 450 K/uL    MPV 9.7 9.4 - 12.3 FL    nRBC 0.00 0.0 - 0.2 K/uL    Differential Type AUTOMATED      Seg Neutrophils 63 43 - 78 %    Lymphocytes 21 13 - 44 %    Monocytes 11 4.0 - 12.0 %    Eosinophils % 4 0.5 - 7.8 %    Basophils 1 0.0 - 2.0 %    Immature Granulocytes 0 0.0 - 5.0 %    Segs Absolute 5.4 1.7 - 8.2 K/UL    Absolute Lymph # 1.8 0.5 - 4.6 K/UL    Absolute Mono # 1.0 0.1 - 1.3 K/UL    Absolute Eos # 0.3 0.0 - 0.8 K/UL    Basophils Absolute 0.1 0.0 - 0.2 K/UL    Absolute Immature Granulocyte 0.0 0.0 - 0.5 K/UL   Magnesium    Collection Time: 02/15/23  4:44 AM   Result Value Ref Range    Magnesium 1.8 1.8 - 2.4 mg/dL         Patient Instructions:   Current Discharge Medication List        START taking these medications    Details   !! amiodarone (CORDARONE) 200 MG tablet Take 1 tablet by mouth 2 times daily for 28 doses  Qty: 28 tablet, Refills: 0      !! amiodarone (CORDARONE) 200 MG tablet Take 1 tablet by mouth daily  Qty: 30 tablet, Refills: 3      apixaban (ELIQUIS) 5 MG TABS tablet Take 1 tablet by mouth 2 times daily  Qty: 60 tablet, Refills: 11      losartan (COZAAR) 25 MG tablet Take 1 tablet by mouth daily  Qty: 30 tablet, Refills: 3      furosemide (LASIX) 40 MG tablet Take 1 tablet by mouth daily  Qty: 60 tablet, Refills: 3      spironolactone (ALDACTONE) 25 MG tablet Take 0.5 tablets by mouth daily  Qty: 30 tablet, Refills: 3      potassium chloride (KLOR-CON M) 20 MEQ extended release tablet Take 1 tablet by mouth daily (with breakfast)  Qty: 60 tablet, Refills: 3       !! - Potential duplicate medications found. Please discuss with provider.         CONTINUE these medications which have CHANGED    Details   carvedilol (COREG) 6.25 MG tablet Take 1 tablet by mouth 2 times daily (with meals)  Qty: 60 tablet, Refills: 3           CONTINUE these medications which have NOT CHANGED    Details   aspirin 81 MG EC tablet Take 1 tablet by mouth daily  Qty: 30 tablet, Refills: 3      atorvastatin (LIPITOR) 80 MG tablet Take 1 tablet by mouth nightly  Qty: 30 tablet, Refills: 3      meloxicam (MOBIC) 7.5 MG tablet Take 7.5 mg by mouth in the morning and at bedtime      DULoxetine (CYMBALTA) 30 MG extended release capsule Take 1 capsule by mouth 2 times daily  Qty: 60 capsule, Refills: 5      acetaminophen (TYLENOL) 325 MG tablet Take 500 mg by mouth 2 times daily               Signed:  FERNANDO Garcia CNP  2/15/2023  10:17 AM

## 2023-02-15 NOTE — DISCHARGE INSTRUCTIONS
The patient is being discharged home in stable condition on a low saturated fat, low cholesterol and low salt diet. The patient is instructed to advance activities as tolerated to the limit of fatigue or shortness of breath. The patient is instructed to call the office or return to the ER for immediate evaluation for any severe shortness of breath, chest pain, prolonged palpitations, near syncope or syncope.

## 2023-02-15 NOTE — PLAN OF CARE
Problem: Discharge Planning  Goal: Discharge to home or other facility with appropriate resources  Outcome: Adequate for Discharge  Flowsheets (Taken 2/14/2023 2022 by Vickey Hale RN)  Discharge to home or other facility with appropriate resources:   Identify barriers to discharge with patient and caregiver   Arrange for needed discharge resources and transportation as appropriate   Identify discharge learning needs (meds, wound care, etc)     Problem: Safety - Adult  Goal: Free from fall injury  Outcome: Adequate for Discharge

## 2023-02-15 NOTE — PROGRESS NOTES
[facility] CARDIOLOGY PROGRESS NOTE           2/15/2023 4:08 PM    Admit Date: 2/13/2023      Subjective:     No acute events overnight. Maintaining sinus rhythm following her cardioversion on 2/14/2023. Telemetry overnight shows sinus rhythm with heart rates between 60 to 80 bpm  Intake versus output reported net negative of -350 cc-likely inaccurate with weight down by 3 pounds overnight.  -  ROS:  Cardiovascular:  As noted above    Objective:      Vitals:    02/15/23 0042 02/15/23 0439 02/15/23 0515 02/15/23 0824   BP: (!) 145/66  (!) 154/69 (!) 152/70   Pulse: 71  71 69   Resp: 20  20    Temp: 99 °F (37.2 °C)  97.9 °F (36.6 °C)    TempSrc: Oral  Oral    SpO2: 96%  96%    Weight:  201 lb 6.4 oz (91.4 kg)         Physical Exam:  General-No Acute Distress  Neck- supple, no JVD  CV- regular rate and rhythm no MRG  Lung- clear bilaterally  Abd- soft, nontender, nondistended  Ext- tr to 1+ edema LLE  Skin- warm and dry    Data Review:   Recent Labs     02/13/23  1438 02/14/23  0426 02/15/23  0444   NA  --  141 139   K  --  3.9 3.3*   MG  --   --  1.8   BUN  --  9 10   WBC  --  7.7 8.6   HGB  --  9.6* 9.7*   HCT  --  30.5* 31.3*   PLT  --  464* 525*   INR 1.1  --   --    CHOL  --  80  --    HDL  --  38*  --            Assessment/Plan:     Principal Problem:    Atrial fibrillation with rapid ventricular response (HCC)  -Status post YAQUELIN/cardioversion. Noted probable severe left atrial enlargement and initiate amiodarone for now. Can reassess long-term use in the outpatient setting.  -Prior echocardiogram from [facility] with stated ejection fraction of 35 to 40% with moderate left atrial enlargement. Active Problems:    Acute HFrEF (heart failure with reduced ejection fraction) ([address]  -Last noted echocardiogram with moderate left ventricular dysfunction as stated. -Improved volume status.  -Continue Coreg. Initiate low-dose ARB/MRA in am.  Titrate therapy further as tolerated.   Consideration for SGLT2 inhibitors.  -Change diuretics to p.o;  improved volume status     Coronary disease  -Recent CABG from 1/2023; continue aspirin/high intensity statin. Hypertension:  -Increasing losartan and carvedilol dosing to 6.25 mg twice daily of carvedilol and 25 mg once daily of losartan. Continue spironolactone at current dosing    Hypokalemia:  -Additional 40 mill equivalents of potassium chloride added for today    Overall, appears to be stable from a cardiac perspective to be discharged home. We will discontinue IV amiodarone  Amiodarone 200 mg twice daily for 2 weeks and then once daily.   Follow-up with Dr. Dru Salomon in the next couple weeks    Orlando Gastelum MD  2/15/2023 4:28 PM

## 2023-02-15 NOTE — PROGRESS NOTES
[facility] CARDIOLOGY PROGRESS NOTE           2/14/2023 4:08 PM    Admit Date: 2/13/2023      Subjective:     No overnight events. ~1.3L net neg. Underwent YAQUELIN/cardioversion earlier today. Successful with first attempt. IV amiodarone initiated subsequently. ROS:  Cardiovascular:  As noted above    Objective:      Vitals:    02/14/23 0444 02/14/23 0730 02/14/23 1045 02/14/23 1733   BP:  137/64 (!) 142/71 (!) 154/65   Pulse:  86 77 78   Resp:  18 19 16   Temp:  98.6 °F (37 °C) 98.7 °F (37.1 °C) 98.5 °F (36.9 °C)   TempSrc:  Oral Oral Oral   SpO2:  97% 94% 93%   Weight: 204 lb 9.6 oz (92.8 kg)          Physical Exam:  General-No Acute Distress  Neck- supple, no JVD  CV- regular rate and rhythm no MRG  Lung- clear bilaterally  Abd- soft, nontender, nondistended  Ext- tr to 1+ edema LLE  Skin- warm and dry    Data Review:   Recent Labs     02/13/23  1022 02/13/23  1438 02/14/23  0426     --  141   K 3.6  --  3.9   BUN 11  --  9   WBC 6.6  --  7.7   HGB 9.6*  --  9.6*   HCT 30.9*  --  30.5*     --  464*   INR  --  1.1  --    CHOL  --   --  80   HDL  --   --  38*       Assessment/Plan:     Principal Problem:    Atrial fibrillation with rapid ventricular response (HCC)  -Status post YAQUELIN/cardioversion. Noted probable severe left atrial enlargement and initiate amiodarone for now. Can reassess long-term use in the outpatient setting.  -[facility] with stated ejection fraction of 35 to 40% with moderate left atrial enlargement.  -Change heparin over to Eliquis. Discontinue Cardizem drip. Continue Coreg    Active Problems:    Acute HFrEF (heart failure with reduced ejection fraction) ([facility] [address]  -Last noted echocardiogram with moderate left ventricular dysfunction as stated. -Improved volume status.  -Continue Coreg. Initiate low-dose ARB/MRA in am.  Titrate therapy further as tolerated.   Consideration for SGLT2 inhibitors.  -Change diuretics to p.o;  improved volume status     Coronary disease  -Recent CABG from 1/2023; continue aspirin/high intensity statin.     Yesenia Roe MD  2/14/2023 4:28 PM

## 2023-02-15 NOTE — CARE COORDINATION
LOS 2D  Patient admitted from [facility] Cardiology hospital follow up appointment with Afib and RVR. Patient underwent CABG X 5 01/31/23 and discharged 02/06. Patient underwent successful eCV 2/14. Patient with discharge order placed this AM at 1100. Patient off the floor prior to 1140 when CM attempted to meet with patient for assessment. Disposition: Home with family assistance.

## 2023-02-16 ENCOUNTER — TELEPHONE (OUTPATIENT)
Dept: CARDIOLOGY CLINIC | Age: 73
End: 2023-02-16

## 2023-02-16 NOTE — TELEPHONE ENCOUNTER
Transitions of care after discharge from [facility]. Diagnosis:  Atrial fibrillation  Date of admission:  2/13/2023  Date of discharge:  2/15/2023    Reviewed discharge summary, including importance of medication compliance, diet, activity and follow-up appointment on 2/21/2023 at 11:45 with Dr. Dru Salomon. All questions answered. Patient will call office prn. Heart rate running in 90s.   Has picked up Eliquis

## 2023-02-20 NOTE — PROGRESS NOTES
[facility] CARDIOLOGY Follow Up                 Reason for Visit: Posthospitalization follow-up    Subjective:     Patient is a 67 y.o. female with a PMH of CAD status post CABG, ischemic cardiomyopathy, bilateral carotid atherosclerosis, and HFmrEF who presents as a posthospitalization follow-up. The patient was last seen on February 13. She was directly admitted for management of acute HFrEF and atrial fibrillation with RVR. The patient underwent IV diuresis and YAQUELIN with DCCV. Amiodarone, Eliquis, losartan, and Aldactone were initiated. The patient had a YAQUELIN on February 15 that was noted to demonstrate an EF of 40 to 45%. The patient reports improvement in her LE edema. The patient reports \"twinges\" on the surface of the chest.  She is taking as needed Lasix for the last 2 days due to a dramatic reduction in weight.     Past Medical History:   Diagnosis Date    Arthritis     osteo    CAD, multiple vessel 01/25/2023    Chronic pain     r/t arthritis in hip and knees    Closed fracture of left proximal humerus 10/10/2020    COVID-19 01/2020    never formally tested, reports symptoms of severe headache, fevers, loss of taste and smell    DDD (degenerative disc disease)     \"back\", chronic pain in lower back    GERD (gastroesophageal reflux disease)     controlled w/ OTC med    Psychiatric disorder     PTSD, severe anxiety with medical procedures    Seizure [facility])     patient denies seizure, states was evaluated by Neurologist and was told she only had syncopal episode    Spondylolisthesis of lumbar region     severe spinal canal stenosis L4-5    Syncope 10/11/2020    Thyroid disease     history of hypo in past. No current issues, no meds      Past Surgical History:   Procedure Laterality Date    CORONARY ARTERY BYPASS GRAFT N/A 1/31/2023    CABG CORONARY ARTERY BYPASS,(CABGX5), LIMA, ENDOSCOPIC GREATER SAPHENOUS VEIN HARVEST LEFT LEG, TRANSESOPHOGEAL ECHOCARDIOGRAM performed by Maribell Liu MD at [facility] MAIN OR JOINT REPLACEMENT Left 2018    SHOULDER SURGERY Left 2019    TOTAL HIP ARTHROPLASTY Right 04/2014      Family History   Problem Relation Age of Onset    Diabetes Mother     Osteoarthritis Mother     Osteoarthritis Father     Lung Disease Father     Cancer Mother       Social History     Tobacco Use    Smoking status: Never    Smokeless tobacco: Never   Substance Use Topics    Alcohol use: Yes     Comment: rarely only on social occasions      Allergies   Allergen Reactions    Contrast [Barium-Containing Compounds] Other (See Comments)     \"Blood Poisoning\"  States, Can take if premedicated with benadryl and prednisone    Oxycodone Nausea And Vomiting and Other (See Comments)     \" incoherent \"     Sulfa Antibiotics Rash         ROS:  No obvious pertinent positives on review of systems except for what was outlined above.        Objective:       /80   Pulse 82   Ht 5' 4\" (1.626 m)   Wt 193 lb (87.5 kg)   BMI 33.13 kg/m²     BP Readings from Last 3 Encounters:   02/21/23 138/80   02/14/23 138/62   02/15/23 (!) 152/70       Wt Readings from Last 3 Encounters:   02/21/23 193 lb (87.5 kg)   02/15/23 201 lb 6.4 oz (91.4 kg)   02/13/23 209 lb 3.2 oz (94.9 kg)       General/Constitutional:   Alert and oriented x 3, no acute distress  HEENT:   normocephalic, atraumatic, moist mucous membranes  Neck:   No JVD or carotid bruits bilaterally  Cardiovascular:   regular rate and rhythm, no rub/gallop appreciated  Pulmonary:   clear to auscultation bilaterally, no respiratory distress  Abdomen:   soft, non-tender, non-distended  Ext:   No sig LE edema bilaterally  Skin:  warm and dry, no obvious rashes seen  Neuro:   no obvious sensory or motor deficits  Psychiatric:   normal mood and affect    Data Review:   Lab Results   Component Value Date    CHOL 80 02/14/2023     Lab Results   Component Value Date    TRIG 78 02/14/2023     Lab Results   Component Value Date    HDL 38 (L) 02/14/2023     Lab Results   Component Value Date    LDLCALC 26.4 02/14/2023     Lab Results   Component Value Date    LABVLDL 15.6 02/14/2023     Lab Results   Component Value Date    CHOLHDLRATIO 2.1 02/14/2023        Lab Results   Component Value Date/Time     02/15/2023 04:44 AM     02/14/2023 04:26 AM     02/13/2023 10:22 AM    K 3.3 02/15/2023 04:44 AM    K 3.9 02/14/2023 04:26 AM    K 3.6 02/13/2023 10:22 AM     02/15/2023 04:44 AM     02/14/2023 04:26 AM     02/13/2023 10:22 AM    CO2 28 02/15/2023 04:44 AM    CO2 28 02/14/2023 04:26 AM    CO2 25 02/13/2023 10:22 AM    BUN 10 02/15/2023 04:44 AM    BUN 9 02/14/2023 04:26 AM    BUN 11 02/13/2023 10:22 AM    CREATININE 0.90 02/15/2023 04:44 AM    CREATININE 0.70 02/14/2023 04:26 AM    CREATININE 0.90 02/13/2023 10:22 AM    GLUCOSE 126 02/15/2023 04:44 AM    GLUCOSE 123 02/14/2023 04:26 AM    GLUCOSE 115 02/13/2023 10:22 AM    CALCIUM 8.8 02/15/2023 04:44 AM    CALCIUM 8.3 02/14/2023 04:26 AM    CALCIUM 9.0 02/13/2023 10:22 AM         Lab Results   Component Value Date    ALT 16 01/27/2023    ALT 18 10/10/2020    AST 9 (L) 01/27/2023    AST 21 10/10/2020        Assessment/Plan:   1. Hx of CABG  - Continue with baby aspirin daily and Lipitor    2. Carotid atherosclerosis, bilateral  - Continue with Lipitor    3. Heart failure with mid-range ejection fraction (HCC)  - Clinically euvolemic on as needed Lasix  - Instructed the patient to take a dose of Lasix for weight gain of 2 lbs in two days or 5 lbs in one week and return to as needed dosing once the weight returns to baseline  - Continue with Coreg and Jardiance  - Continue with losartan and Aldactone pending a chemistry profile  - Obtain a BMP and magnesium level     4.  Atrial fibrillation, unspecified type (HCC)  - [id number] equals 4  - Currently in sinus rhythm  - Currently on amiodarone  - Discussed the potential severity and irreversible nature of some of the adverse effects of amiodarone  - Refer to EP to consider alternative AAD, a trial off AAD at some point, or RFA  - Continue with Eliquis    5.  Atypical chest pain  - Chest pain is nonanginal by definition; therefore, an ischemic evaluation is not warranted at this time  - Low clinical suspicion for pericarditis or aortic dissection  - PE unlikely per PE Wells score    F/U: 3 months    Valentin Ochoa MD

## 2023-02-21 ENCOUNTER — OFFICE VISIT (OUTPATIENT)
Dept: CARDIOLOGY CLINIC | Age: 73
End: 2023-02-21
Payer: MEDICARE

## 2023-02-21 VITALS
HEART RATE: 82 BPM | BODY MASS INDEX: 32.95 KG/M2 | SYSTOLIC BLOOD PRESSURE: 138 MMHG | DIASTOLIC BLOOD PRESSURE: 80 MMHG | HEIGHT: 64 IN | WEIGHT: 193 LBS

## 2023-02-21 DIAGNOSIS — I48.91 ATRIAL FIBRILLATION, UNSPECIFIED TYPE (HCC): ICD-10-CM

## 2023-02-21 DIAGNOSIS — I50.22 HEART FAILURE WITH MID-RANGE EJECTION FRACTION (HCC): ICD-10-CM

## 2023-02-21 DIAGNOSIS — I65.23 CAROTID ATHEROSCLEROSIS, BILATERAL: ICD-10-CM

## 2023-02-21 DIAGNOSIS — R07.89 ATYPICAL CHEST PAIN: ICD-10-CM

## 2023-02-21 DIAGNOSIS — Z95.1 HX OF CABG: Primary | ICD-10-CM

## 2023-02-21 PROCEDURE — 1123F ACP DISCUSS/DSCN MKR DOCD: CPT | Performed by: INTERNAL MEDICINE

## 2023-02-21 PROCEDURE — 99214 OFFICE O/P EST MOD 30 MIN: CPT | Performed by: INTERNAL MEDICINE

## 2023-02-21 RX ORDER — CALCIUM CARBONATE 750 MG/1
300 TABLET, CHEWABLE ORAL PRN
COMMUNITY

## 2023-02-21 RX ORDER — EMPAGLIFLOZIN 10 MG/1
10 TABLET, FILM COATED ORAL DAILY
COMMUNITY
Start: 2023-02-13

## 2023-02-21 RX ORDER — METOPROLOL SUCCINATE 25 MG/1
25 TABLET, EXTENDED RELEASE ORAL DAILY
COMMUNITY
Start: 2023-01-11 | End: 2023-02-21

## 2023-02-22 ENCOUNTER — TELEPHONE (OUTPATIENT)
Dept: CARDIOLOGY CLINIC | Age: 73
End: 2023-02-22

## 2023-02-22 LAB
ANION GAP SERPL CALC-SCNC: 6 MMOL/L (ref 2–11)
BUN SERPL-MCNC: 12 MG/DL (ref 8–23)
CALCIUM SERPL-MCNC: 10.1 MG/DL (ref 8.3–10.4)
CHLORIDE SERPL-SCNC: 112 MMOL/L (ref 101–110)
CO2 SERPL-SCNC: 23 MMOL/L (ref 21–32)
CREAT SERPL-MCNC: 0.9 MG/DL (ref 0.6–1)
GLUCOSE SERPL-MCNC: 105 MG/DL (ref 65–100)
MAGNESIUM SERPL-MCNC: 2.2 MG/DL (ref 1.8–2.4)
POTASSIUM SERPL-SCNC: 4.5 MMOL/L (ref 3.5–5.1)
SODIUM SERPL-SCNC: 141 MMOL/L (ref 133–143)

## 2023-02-22 NOTE — TELEPHONE ENCOUNTER
----- Message from London Batres MD sent at 2/22/2023  8:53 AM EST -----  Please let the patient know that her lab work demonstrates a normal potassium level and a normal renal function. No new changes to medical therapy at this time.

## 2023-03-01 ENCOUNTER — OFFICE VISIT (OUTPATIENT)
Dept: CARDIOTHORACIC SURGERY | Age: 73
End: 2023-03-01

## 2023-03-01 VITALS
DIASTOLIC BLOOD PRESSURE: 90 MMHG | WEIGHT: 190.6 LBS | SYSTOLIC BLOOD PRESSURE: 142 MMHG | HEART RATE: 68 BPM | BODY MASS INDEX: 32.72 KG/M2

## 2023-03-01 DIAGNOSIS — Z95.1 S/P CABG X 5: ICD-10-CM

## 2023-03-01 PROCEDURE — 99024 POSTOP FOLLOW-UP VISIT: CPT | Performed by: PHYSICIAN ASSISTANT

## 2023-03-01 NOTE — PROGRESS NOTES
[address] [facility]  Catarina Baraprylchristopher. MD Davon Blum. Odilon Cain MD          Nicol Palomares       [id number]  3/1/2023       1950      Nicol Palomares is seen today for follow-up status post CABG X 5 with LIMA to the LAD, reverse SVG to the diagonal, reverse SVG to the ramus, reverse SVG to the OM, and reverse SVG to the distal RCA on 1/31/23. She did well post operatively and was transferred to [facility] on POD 1. She was weaned off of O2. She progressed slowly with PT given her longstanding chronic back pain and OA. Inpatient rehab was recommended. She chose to go home with [facility] and was discharged on 2/06/2023. She was readmitted with a-[facility] with RVR and underwent successful cardioversion. she is fairly active and ambulatory, activity limited by chronic back pain. There is no fever or shortness of breath. Appetite is good. Pain has improved. Pt is sleeping ok. EXAM:  Vitals:  Blood pressure (!) 142/90, pulse 68, weight 190 lb 9.6 oz (86.5 kg). Lungs:  Clear to auscultation bilaterally. Heart: Regular rate and rhythm without murmurs. Incision: Sternum stable. Incision healing well. Leg incisions healing well. IMPRESSION and PLAN:  DC from CT surgery. Pt may drive. Pt does not feel comfortable doing cardiac rehab due to back pain and \"heart function. \" She is encouraged to gradually increase her activity. Sternal precautions: Pt advised to avoid any heavy lifting for another 4 weeks but can increase activity as tolerated. Pt has cardiology follow-up with Dr. Lorne Coreas  No need to schedule follow-up at this point but the patient may call or return anytime if issues or questions arise. Irasema Foss.  JUAN Monteiro  3/1/2023

## 2023-03-15 PROBLEM — Z13.220 SCREENING FOR LIPID DISORDERS: Status: RESOLVED | Noted: 2023-02-03 | Resolved: 2023-03-15

## 2023-03-20 ENCOUNTER — NURSE TRIAGE (OUTPATIENT)
Dept: TELEHEALTH | Age: 73
End: 2023-03-20

## 2023-03-22 NOTE — FLOWSHEET NOTE
CONVERSING BY TEXT MESSAGE AS  HAS CHOCLEAR IMPLANT
Patient stated just came from bathroom and ambulating around room
TEXT MESSAGE
36.1

## 2023-03-27 ENCOUNTER — APPOINTMENT (OUTPATIENT)
Dept: LAB | Age: 73
End: 2023-03-27
Attending: INTERNAL MEDICINE

## 2023-03-27 ENCOUNTER — OFFICE VISIT (OUTPATIENT)
Dept: INTERNAL MEDICINE | Age: 73
End: 2023-03-27
Attending: INTERNAL MEDICINE

## 2023-03-27 VITALS
HEIGHT: 64 IN | DIASTOLIC BLOOD PRESSURE: 86 MMHG | TEMPERATURE: 97.6 F | BODY MASS INDEX: 37.94 KG/M2 | WEIGHT: 222.2 LBS | RESPIRATION RATE: 16 BRPM | OXYGEN SATURATION: 98 % | HEART RATE: 90 BPM | SYSTOLIC BLOOD PRESSURE: 146 MMHG

## 2023-03-27 DIAGNOSIS — Z00.00 MEDICARE ANNUAL WELLNESS VISIT, SUBSEQUENT: Primary | ICD-10-CM

## 2023-03-27 DIAGNOSIS — N18.2 CKD (CHRONIC KIDNEY DISEASE) STAGE 2, GFR 60-89 ML/MIN: ICD-10-CM

## 2023-03-27 DIAGNOSIS — E66.01 CLASS 2 SEVERE OBESITY DUE TO EXCESS CALORIES WITH SERIOUS COMORBIDITY AND BODY MASS INDEX (BMI) OF 37.0 TO 37.9 IN ADULT (CMD): ICD-10-CM

## 2023-03-27 DIAGNOSIS — M17.0 PRIMARY OSTEOARTHRITIS OF BOTH KNEES: ICD-10-CM

## 2023-03-27 DIAGNOSIS — R80.9 PROTEINURIA, UNSPECIFIED TYPE: ICD-10-CM

## 2023-03-27 DIAGNOSIS — I10 ESSENTIAL HYPERTENSION: ICD-10-CM

## 2023-03-27 DIAGNOSIS — R41.3 MEMORY IMPAIRMENT: ICD-10-CM

## 2023-03-27 LAB
ALBUMIN SERPL-MCNC: 3.8 G/DL (ref 3.6–5.1)
ALBUMIN/GLOB SERPL: 1.1 {RATIO} (ref 1–2.4)
ALP SERPL-CCNC: 102 UNITS/L (ref 45–117)
ALT SERPL-CCNC: 12 UNITS/L
ANION GAP SERPL CALC-SCNC: 6 MMOL/L (ref 7–19)
AST SERPL-CCNC: 9 UNITS/L
BILIRUB SERPL-MCNC: 0.3 MG/DL (ref 0.2–1)
BUN SERPL-MCNC: 14 MG/DL (ref 6–20)
BUN/CREAT SERPL: 16 (ref 7–25)
CALCIUM SERPL-MCNC: 9.5 MG/DL (ref 8.4–10.2)
CHLORIDE SERPL-SCNC: 109 MMOL/L (ref 97–110)
CO2 SERPL-SCNC: 29 MMOL/L (ref 21–32)
CREAT SERPL-MCNC: 0.88 MG/DL (ref 0.51–0.95)
CREAT UR-MCNC: 292 MG/DL
FASTING DURATION TIME PATIENT: ABNORMAL H
GFR SERPLBLD BASED ON 1.73 SQ M-ARVRAT: 69 ML/MIN
GLOBULIN SER-MCNC: 3.4 G/DL (ref 2–4)
GLUCOSE SERPL-MCNC: 116 MG/DL (ref 70–99)
MICROALBUMIN UR-MCNC: 31.8 MG/DL
MICROALBUMIN/CREAT UR: 108.9 MG/G
POTASSIUM SERPL-SCNC: 3.9 MMOL/L (ref 3.4–5.1)
PROT SERPL-MCNC: 7.2 G/DL (ref 6.4–8.2)
SODIUM SERPL-SCNC: 140 MMOL/L (ref 135–145)

## 2023-03-27 PROCEDURE — 82043 UR ALBUMIN QUANTITATIVE: CPT | Performed by: NURSE PRACTITIONER

## 2023-03-27 PROCEDURE — 80053 COMPREHEN METABOLIC PANEL: CPT | Performed by: NURSE PRACTITIONER

## 2023-03-27 PROCEDURE — 36415 COLL VENOUS BLD VENIPUNCTURE: CPT | Performed by: NURSE PRACTITIONER

## 2023-03-27 PROCEDURE — 99214 OFFICE O/P EST MOD 30 MIN: CPT | Performed by: NURSE PRACTITIONER

## 2023-03-27 PROCEDURE — G0439 PPPS, SUBSEQ VISIT: HCPCS | Performed by: NURSE PRACTITIONER

## 2023-03-27 ASSESSMENT — PATIENT HEALTH QUESTIONNAIRE - PHQ9
4. FEELING TIRED OR HAVING LITTLE ENERGY: NOT AT ALL
2. FEELING DOWN, DEPRESSED OR HOPELESS: NOT AT ALL
5. POOR APPETITE, WEIGHT LOSS, OR OVEREATING: NOT AT ALL
CLINICAL INTERPRETATION OF PHQ2 SCORE: FURTHER SCREENING NEEDED
9. THOUGHTS THAT YOU WOULD BE BETTER OFF DEAD, OR OF HURTING YOURSELF: NOT AT ALL
1. LITTLE INTEREST OR PLEASURE IN DOING THINGS: NEARLY EVERY DAY
3. TROUBLE FALLING OR STAYING ASLEEP OR SLEEPING TOO MUCH: NOT AT ALL
CLINICAL INTERPRETATION OF PHQ2 SCORE: NO FURTHER SCREENING NEEDED
7. TROUBLE CONCENTRATING ON THINGS, SUCH AS READING THE NEWSPAPER OR WATCHING TELEVISION: SEVERAL DAYS
1. LITTLE INTEREST OR PLEASURE IN DOING THINGS: NOT AT ALL
SUM OF ALL RESPONSES TO PHQ9 QUESTIONS 1 AND 2: 3
2. FEELING DOWN, DEPRESSED OR HOPELESS: NOT AT ALL
SUM OF ALL RESPONSES TO PHQ QUESTIONS 1-9: 4
CLINICAL INTERPRETATION OF PHQ9 SCORE: MINIMAL DEPRESSION
SUM OF ALL RESPONSES TO PHQ9 QUESTIONS 1 AND 2: 0
6. FEELING BAD ABOUT YOURSELF - OR THAT YOU ARE A FAILURE OR HAVE LET YOURSELF OR YOUR FAMILY DOWN: NOT AT ALL
SUM OF ALL RESPONSES TO PHQ9 QUESTIONS 1 AND 2: 0
8. MOVING OR SPEAKING SO SLOWLY THAT OTHER PEOPLE COULD HAVE NOTICED. OR THE OPPOSITE, BEING SO FIGETY OR RESTLESS THAT YOU HAVE BEEN MOVING AROUND A LOT MORE THAN USUAL: NOT AT ALL
SUM OF ALL RESPONSES TO PHQ9 QUESTIONS 1 AND 2: 3

## 2023-03-28 ENCOUNTER — TELEPHONE (OUTPATIENT)
Dept: INTERNAL MEDICINE | Age: 73
End: 2023-03-28

## 2023-03-28 DIAGNOSIS — I10 ESSENTIAL HYPERTENSION: Primary | ICD-10-CM

## 2023-03-28 DIAGNOSIS — R80.9 PROTEINURIA, UNSPECIFIED TYPE: ICD-10-CM

## 2023-03-28 RX ORDER — AMLODIPINE BESYLATE 5 MG/1
5 TABLET ORAL DAILY
Qty: 90 TABLET | Refills: 0 | Status: SHIPPED | OUTPATIENT
Start: 2023-03-28 | End: 2023-04-18 | Stop reason: SDUPTHER

## 2023-03-28 RX ORDER — LOSARTAN POTASSIUM 50 MG/1
50 TABLET ORAL DAILY
Qty: 90 TABLET | Refills: 1 | Status: SHIPPED | OUTPATIENT
Start: 2023-03-28 | End: 2023-08-28 | Stop reason: DRUGHIGH

## 2023-03-29 ENCOUNTER — TELEPHONE (OUTPATIENT)
Dept: INTERNAL MEDICINE | Age: 73
End: 2023-03-29

## 2023-04-04 ENCOUNTER — HOSPITAL ENCOUNTER (OUTPATIENT)
Dept: REHABILITATION | Age: 73
Discharge: STILL ADMITTED OR ADMINISTRATIVE | End: 2023-04-04
Attending: NURSE PRACTITIONER

## 2023-04-04 PROCEDURE — 97161 PT EVAL LOW COMPLEX 20 MIN: CPT | Performed by: PHYSICAL THERAPIST

## 2023-04-04 PROCEDURE — 10004173 HB COUNTER-THERAPY VISIT PT: Performed by: PHYSICAL THERAPIST

## 2023-04-04 ASSESSMENT — ENCOUNTER SYMPTOMS
ALLEVIATING FACTORS: UNABLE TO STATE ANYTHING THAT HELPS REDUCE THE PAIN
PAIN SEVERITY NOW: 5
QUALITY: ACHE
QUALITY: SHARP
PAIN SCALE AT HIGHEST: 9

## 2023-04-04 ASSESSMENT — MOVEMENT AND STRENGTH ASSESSMENTS
GOING UP OR DOWN 10 STAIRS (ABOUT 1 FLIGHT OF STAIRS): QUITE A BIT OF DIFFICULTY
SITTING FOR 1 HOUR: EXTREME DIFFICULTY OR UNABLE TO PERFORM ACTIVITY
PUTTING ON YOUR SHOES OR SOCKS: A LITTLE BIT OF DIFFICULTY
YOUR USUAL HOBBIES, RECREATIONAL OR SPORTING ACTIVIITIES: A LITTLE BIT OF DIFFICULTY
WALKING BETWEEN ROOMS: A LITTLE BIT OF DIFFICULTY
ROLLING OVER IN BED: EXTREME DIFFICULTY OR UNABLE TO PERFORM ACTIVITY
RUNNING ON EVEN GROUND: EXTREME DIFFICULTY OR UNABLE TO PERFORM ACTIVITY
RUNNING ON UNEVEN GROUND: EXTREME DIFFICULTY OR UNABLE TO PERFORM ACTIVITY
HOPPING: EXTREME DIFFICULTY OR UNABLE TO PERFORM ACTIVITY
WALKING A MILE: QUITE A BIT OF DIFFICULTY
LIFTING AN OBJECT, LIKE A BAG OF GROCERIES, FROM THE FLOOR: QUITE A BIT OF DIFFICULTY
STANDING FOR 1 HOUR: QUITE A BIT OF DIFFICULTY
PERFORMING LIGHT ACTIVITES AROUND YOUR HOME: QUITE A BIT OF DIFFICULTY
TOTAL SCORE: 32.5
SQUATTING: A LITTLE BIT OF DIFFICULTY
GETTING INTO OR OUT OF THE BATH: A LITTLE BIT OF DIFFICULTY
MAKING SHARP TURNS WHILE RUNNING FAST: EXTREME DIFFICULTY OR UNABLE TO PERFORM ACTIVITY
PERFORMING HEAVY ACTIVITIES AROUND YOUR HOME: QUITE A BIT OF DIFFICULTY
GETTING INTO OR OUT OF A CAR: QUITE A BIT OF DIFFICULTY
ANY OF YOUR USUAL WORK, HOUSEWORK OR SCHOOL ACTIVITIES: A LITTLE BIT OF DIFFICULTY
WALKING 2 BLOCKS: QUITE A BIT OF DIFFICULTY

## 2023-04-05 ENCOUNTER — OFFICE VISIT (OUTPATIENT)
Dept: PSYCHOLOGY | Age: 73
End: 2023-04-05
Attending: NURSE PRACTITIONER

## 2023-04-05 DIAGNOSIS — G31.84 MILD NEUROCOGNITIVE DISORDER: Primary | ICD-10-CM

## 2023-04-05 PROCEDURE — 96133 NRPSYC TST EVAL PHYS/QHP EA: CPT | Performed by: CLINICAL NEUROPSYCHOLOGIST

## 2023-04-05 PROCEDURE — 96138 PSYCL/NRPSYC TECH 1ST: CPT | Performed by: CLINICAL NEUROPSYCHOLOGIST

## 2023-04-05 PROCEDURE — 96116 NUBHVL XM PHYS/QHP 1ST HR: CPT | Performed by: CLINICAL NEUROPSYCHOLOGIST

## 2023-04-05 PROCEDURE — 96132 NRPSYC TST EVAL PHYS/QHP 1ST: CPT | Performed by: CLINICAL NEUROPSYCHOLOGIST

## 2023-04-05 PROCEDURE — 96139 PSYCL/NRPSYC TST TECH EA: CPT | Performed by: CLINICAL NEUROPSYCHOLOGIST

## 2023-04-07 ENCOUNTER — APPOINTMENT (OUTPATIENT)
Dept: ULTRASOUND IMAGING | Age: 73
End: 2023-04-07
Attending: NURSE PRACTITIONER

## 2023-04-11 ENCOUNTER — TELEPHONE (OUTPATIENT)
Dept: REHABILITATION | Age: 73
End: 2023-04-11

## 2023-04-18 ENCOUNTER — OFFICE VISIT (OUTPATIENT)
Dept: INTERNAL MEDICINE | Age: 73
End: 2023-04-18
Attending: INTERNAL MEDICINE

## 2023-04-18 ENCOUNTER — HOSPITAL ENCOUNTER (OUTPATIENT)
Dept: REHABILITATION | Age: 73
Discharge: STILL ADMITTED OR ADMINISTRATIVE | End: 2023-04-18
Attending: NURSE PRACTITIONER

## 2023-04-18 VITALS
OXYGEN SATURATION: 98 % | SYSTOLIC BLOOD PRESSURE: 152 MMHG | RESPIRATION RATE: 12 BRPM | BODY MASS INDEX: 37.9 KG/M2 | HEART RATE: 75 BPM | TEMPERATURE: 98.1 F | HEIGHT: 64 IN | DIASTOLIC BLOOD PRESSURE: 92 MMHG | WEIGHT: 222 LBS

## 2023-04-18 DIAGNOSIS — E55.9 VITAMIN D DEFICIENCY: Primary | ICD-10-CM

## 2023-04-18 DIAGNOSIS — G31.84 MILD NEUROCOGNITIVE DISORDER: ICD-10-CM

## 2023-04-18 DIAGNOSIS — I10 ESSENTIAL HYPERTENSION: ICD-10-CM

## 2023-04-18 PROCEDURE — 99214 OFFICE O/P EST MOD 30 MIN: CPT | Performed by: STUDENT IN AN ORGANIZED HEALTH CARE EDUCATION/TRAINING PROGRAM

## 2023-04-18 PROCEDURE — 97110 THERAPEUTIC EXERCISES: CPT | Performed by: PHYSICAL THERAPIST

## 2023-04-18 PROCEDURE — 10004173 HB COUNTER-THERAPY VISIT PT: Performed by: PHYSICAL THERAPIST

## 2023-04-18 RX ORDER — AMLODIPINE BESYLATE 5 MG/1
10 TABLET ORAL DAILY
Qty: 90 TABLET | Refills: 0 | Status: SHIPPED | OUTPATIENT
Start: 2023-04-18

## 2023-04-18 ASSESSMENT — PATIENT HEALTH QUESTIONNAIRE - PHQ9
CLINICAL INTERPRETATION OF PHQ2 SCORE: NO FURTHER SCREENING NEEDED
2. FEELING DOWN, DEPRESSED OR HOPELESS: NOT AT ALL
SUM OF ALL RESPONSES TO PHQ9 QUESTIONS 1 AND 2: 0
1. LITTLE INTEREST OR PLEASURE IN DOING THINGS: NOT AT ALL
SUM OF ALL RESPONSES TO PHQ9 QUESTIONS 1 AND 2: 0

## 2023-04-18 ASSESSMENT — ENCOUNTER SYMPTOMS: PAIN SEVERITY NOW: 0

## 2023-04-19 ENCOUNTER — APPOINTMENT (OUTPATIENT)
Dept: MRI IMAGING | Age: 73
End: 2023-04-19
Attending: NURSE PRACTITIONER

## 2023-04-21 ENCOUNTER — HOSPITAL ENCOUNTER (OUTPATIENT)
Dept: ULTRASOUND IMAGING | Age: 73
Discharge: HOME | End: 2023-04-21
Attending: NURSE PRACTITIONER

## 2023-04-21 DIAGNOSIS — N18.2 CKD (CHRONIC KIDNEY DISEASE) STAGE 2, GFR 60-89 ML/MIN: ICD-10-CM

## 2023-04-21 DIAGNOSIS — R80.9 PROTEINURIA, UNSPECIFIED TYPE: ICD-10-CM

## 2023-04-21 PROCEDURE — 76770 US EXAM ABDO BACK WALL COMP: CPT

## 2023-04-21 PROCEDURE — 76770 US EXAM ABDO BACK WALL COMP: CPT | Performed by: RADIOLOGY

## 2023-04-24 ENCOUNTER — TELEPHONE (OUTPATIENT)
Dept: REHABILITATION | Age: 73
End: 2023-04-24

## 2023-05-02 ENCOUNTER — APPOINTMENT (OUTPATIENT)
Dept: REHABILITATION | Age: 73
End: 2023-05-02
Attending: NURSE PRACTITIONER

## 2023-05-22 ENCOUNTER — TELEPHONE (OUTPATIENT)
Dept: OTHER | Age: 73
End: 2023-05-22

## 2023-06-05 RX ORDER — ATORVASTATIN CALCIUM 80 MG/1
80 TABLET, FILM COATED ORAL NIGHTLY
Qty: 30 TABLET | Refills: 5 | Status: SHIPPED | OUTPATIENT
Start: 2023-06-05

## 2023-06-05 NOTE — TELEPHONE ENCOUNTER
Almost out of Lipitor 80 mg qd. Saw Dr. Mireille Britt on 2/21/23. Scheduled to see Dr. Adalberto Alexander on 8/11/23 and in the future, because Dr. Adalberto Alexander is a family friend. Requests Lipitor refill be sent to [facility] on [address] in [place], if she is to continue Lipitor.    Requested Prescriptions     Pending Prescriptions Disp Refills    atorvastatin (LIPITOR) 80 MG tablet 30 tablet 5     Sig: Take 1 tablet by mouth nightly     Pended Lipitor 80 mg qd refill, as above, sent to Dr. Mireille Britt for approval.

## 2023-07-11 ENCOUNTER — TELEPHONE (OUTPATIENT)
Dept: INTERNAL MEDICINE | Age: 73
End: 2023-07-11

## 2023-07-11 ENCOUNTER — OFF PREMISE CHARGES (OUTPATIENT)
Dept: INTERNAL MEDICINE | Age: 73
End: 2023-07-11

## 2023-07-11 DIAGNOSIS — I89.0 LYMPHEDEMA OF BOTH LOWER EXTREMITIES: ICD-10-CM

## 2023-07-11 DIAGNOSIS — M17.0 PRIMARY OSTEOARTHRITIS OF BOTH KNEES: ICD-10-CM

## 2023-07-11 DIAGNOSIS — G31.84 MILD NEUROCOGNITIVE DISORDER: ICD-10-CM

## 2023-07-11 PROCEDURE — G0179 MD RECERTIFICATION HHA PT: HCPCS | Performed by: INTERNAL MEDICINE

## 2023-07-16 DIAGNOSIS — K21.9 GERD WITH STRICTURE: ICD-10-CM

## 2023-07-16 DIAGNOSIS — K22.2 GERD WITH STRICTURE: ICD-10-CM

## 2023-07-16 DIAGNOSIS — K22.10 EROSIVE ESOPHAGITIS: ICD-10-CM

## 2023-07-17 RX ORDER — PANTOPRAZOLE SODIUM 40 MG/1
40 TABLET, DELAYED RELEASE ORAL DAILY
Qty: 90 TABLET | Refills: 1 | Status: SHIPPED | OUTPATIENT
Start: 2023-07-17

## 2023-08-11 ENCOUNTER — OFFICE VISIT (OUTPATIENT)
Age: 73
End: 2023-08-11
Payer: MEDICARE

## 2023-08-11 ENCOUNTER — PATIENT MESSAGE (OUTPATIENT)
Age: 73
End: 2023-08-11

## 2023-08-11 VITALS
BODY MASS INDEX: 31.58 KG/M2 | WEIGHT: 185 LBS | HEIGHT: 64 IN | OXYGEN SATURATION: 99 % | HEART RATE: 74 BPM | SYSTOLIC BLOOD PRESSURE: 126 MMHG | DIASTOLIC BLOOD PRESSURE: 82 MMHG

## 2023-08-11 DIAGNOSIS — G89.4 CHRONIC PAIN SYNDROME: Chronic | ICD-10-CM

## 2023-08-11 DIAGNOSIS — I50.20 HFREF (HEART FAILURE WITH REDUCED EJECTION FRACTION) (HCC): ICD-10-CM

## 2023-08-11 DIAGNOSIS — I65.23 CAROTID ATHEROSCLEROSIS, BILATERAL: ICD-10-CM

## 2023-08-11 DIAGNOSIS — I25.10 CAD, MULTIPLE VESSEL: Primary | ICD-10-CM

## 2023-08-11 DIAGNOSIS — E78.5 DYSLIPIDEMIA: Chronic | ICD-10-CM

## 2023-08-11 DIAGNOSIS — I48.0 PAROXYSMAL ATRIAL FIBRILLATION (HCC): ICD-10-CM

## 2023-08-11 PROBLEM — I50.22 HEART FAILURE WITH MID-RANGE EJECTION FRACTION (HCC): Status: RESOLVED | Noted: 2023-02-21 | Resolved: 2023-08-11

## 2023-08-11 PROBLEM — Z95.1 HX OF CABG: Status: RESOLVED | Noted: 2023-01-31 | Resolved: 2023-08-11

## 2023-08-11 PROBLEM — Z95.1 S/P CABG X 5: Status: RESOLVED | Noted: 2023-01-31 | Resolved: 2023-08-11

## 2023-08-11 PROBLEM — R07.89 ATYPICAL CHEST PAIN: Status: RESOLVED | Noted: 2023-01-25 | Resolved: 2023-08-11

## 2023-08-11 PROCEDURE — 99214 OFFICE O/P EST MOD 30 MIN: CPT | Performed by: INTERNAL MEDICINE

## 2023-08-11 PROCEDURE — 1123F ACP DISCUSS/DSCN MKR DOCD: CPT | Performed by: INTERNAL MEDICINE

## 2023-08-11 RX ORDER — SPIRONOLACTONE 25 MG/1
12.5 TABLET ORAL DAILY
Qty: 45 TABLET | Refills: 3 | Status: SHIPPED | OUTPATIENT
Start: 2023-08-11

## 2023-08-11 RX ORDER — CARVEDILOL 6.25 MG/1
6.25 TABLET ORAL 2 TIMES DAILY WITH MEALS
Qty: 180 TABLET | Refills: 3 | Status: SHIPPED | OUTPATIENT
Start: 2023-08-11

## 2023-08-11 RX ORDER — ATORVASTATIN CALCIUM 80 MG/1
80 TABLET, FILM COATED ORAL NIGHTLY
Qty: 90 TABLET | Refills: 3 | Status: SHIPPED | OUTPATIENT
Start: 2023-08-11

## 2023-08-11 RX ORDER — LOSARTAN POTASSIUM 25 MG/1
25 TABLET ORAL DAILY
Qty: 90 TABLET | Refills: 3 | Status: SHIPPED | OUTPATIENT
Start: 2023-08-11

## 2023-08-11 ASSESSMENT — ENCOUNTER SYMPTOMS
SHORTNESS OF BREATH: 0
ABDOMINAL PAIN: 0
BACK PAIN: 0
COUGH: 0

## 2023-08-11 NOTE — PROGRESS NOTES
[facility] CARDIOLOGY  [address] [facility]  Blade Baezer, [address] [place] [address]      23      NAME:  Elizabeth Tarango  : 1950  MRN: [id number]      SUBJECTIVE:   Elizabeth Tarango is a 75 y.o. female seen for a follow up visit regarding the following:     Chief Complaint   Patient presents with    Coronary Artery Disease       HPI:   75 y.o. female with history of chronic pain syndrome secondary to extensive cervical and lumbar spine disease. Patient developed severe substernal chest discomfort and was subsequently diagnosed with multi vessel CAD now status post coronary bypass grafting 2023. She has mild to moderate reduction in LV systolic function with LVEF 40-45%. She struggled postoperatively with volume retention and A-fib. She is now clinically euvolemic and remains in sinus rhythm. She still struggles with extensive fatigue weakness and chronic pain. Patient unfortunately unable to participate in any sort of physical therapy or cardiac rehab secondary to chronic pain. Patient reports several episodes of feeling fuzzy headed followed by near syncopal episodes. Unfortunately these episodes are rare and short-lived. These may be difficult to capture on traditional outpatient monitoring. Past Medical History, Past Surgical History, Family history, Social History, and Medications were all reviewed with the patient today and updated as necessary.      Current Outpatient Medications   Medication Sig Dispense Refill    carvedilol (COREG) 6.25 MG tablet Take 1 tablet by mouth 2 times daily (with meals) 180 tablet 3    losartan (COZAAR) 25 MG tablet Take 1 tablet by mouth daily 90 tablet 3    spironolactone (ALDACTONE) 25 MG tablet Take 0.5 tablets by mouth daily 45 tablet 3    atorvastatin (LIPITOR) 80 MG tablet Take 1 tablet by mouth nightly 90 tablet 3    JARDIANCE 10 MG tablet Take 1 tablet by mouth daily      calcium carbonate (TUMS EX) 750 MG chewable tablet Take 300 mg by mouth as

## 2023-08-14 NOTE — TELEPHONE ENCOUNTER
From: Spring Levi  To: Dr. Metzger : 2023 8:20 PM EDT  Subject: Cymbalta prescription    Allison Padron's prescription for Duloxetine DR 30 mg cap - one capsule twice a day (Cymbalta) has  (Dr Jeni Olivarez prescribed originally for her back.) Are you able to renew the prescription? If so, [facility] on [address] [place] [address] is our preferred pharmacy for this drug. It's $14 at [facility] and $150 at [facility]. Don't know why there is such a difference in cost. Rosy Levi has a few left, so no rush. Thank you so much for your reassuring words today. I can't describe how much of a difference this made to Rosy Levi. You eliminated so much fear from her mind. You impacted both of us with a renewed sense of hope.     Thank you,  Geronimo

## 2023-08-28 ENCOUNTER — APPOINTMENT (OUTPATIENT)
Dept: LAB | Age: 73
End: 2023-08-28

## 2023-08-28 ENCOUNTER — NURSE TRIAGE (OUTPATIENT)
Dept: TELEHEALTH | Age: 73
End: 2023-08-28

## 2023-08-28 ENCOUNTER — OFFICE VISIT (OUTPATIENT)
Dept: INTERNAL MEDICINE | Age: 73
End: 2023-08-28

## 2023-08-28 VITALS
DIASTOLIC BLOOD PRESSURE: 72 MMHG | BODY MASS INDEX: 38.28 KG/M2 | TEMPERATURE: 97.7 F | WEIGHT: 224.2 LBS | HEART RATE: 78 BPM | HEIGHT: 64 IN | SYSTOLIC BLOOD PRESSURE: 154 MMHG | OXYGEN SATURATION: 99 %

## 2023-08-28 DIAGNOSIS — N18.2 CKD (CHRONIC KIDNEY DISEASE) STAGE 2, GFR 60-89 ML/MIN: ICD-10-CM

## 2023-08-28 DIAGNOSIS — R30.0 DYSURIA: Primary | ICD-10-CM

## 2023-08-28 DIAGNOSIS — I10 ESSENTIAL HYPERTENSION: ICD-10-CM

## 2023-08-28 LAB
APPEARANCE UR: CLEAR
BACTERIA #/AREA URNS HPF: ABNORMAL /HPF
BILIRUB UR QL STRIP: NEGATIVE
COLOR UR: ABNORMAL
GLUCOSE UR STRIP-MCNC: NEGATIVE MG/DL
HBA1C MFR BLD: 5.4 % (ref 4.5–5.6)
HGB UR QL STRIP: NEGATIVE
HYALINE CASTS #/AREA URNS LPF: ABNORMAL /LPF
KETONES UR STRIP-MCNC: NEGATIVE MG/DL
LEUKOCYTE ESTERASE UR QL STRIP: NEGATIVE
MUCOUS THREADS URNS QL MICRO: PRESENT
NITRITE UR QL STRIP: NEGATIVE
PH UR STRIP: 6.5 [PH] (ref 5–7)
PROT UR STRIP-MCNC: 30 MG/DL
RBC #/AREA URNS HPF: ABNORMAL /HPF
SP GR UR STRIP: 1.02 (ref 1–1.03)
SQUAMOUS #/AREA URNS HPF: ABNORMAL /HPF
UROBILINOGEN UR STRIP-MCNC: 0.2 MG/DL
WBC #/AREA URNS HPF: ABNORMAL /HPF

## 2023-08-28 PROCEDURE — 81001 URINALYSIS AUTO W/SCOPE: CPT

## 2023-08-28 PROCEDURE — 83036 HEMOGLOBIN GLYCOSYLATED A1C: CPT

## 2023-08-28 PROCEDURE — 99213 OFFICE O/P EST LOW 20 MIN: CPT

## 2023-08-28 PROCEDURE — 84156 ASSAY OF PROTEIN URINE: CPT

## 2023-08-28 PROCEDURE — 36415 COLL VENOUS BLD VENIPUNCTURE: CPT

## 2023-08-28 RX ORDER — LOSARTAN POTASSIUM 100 MG/1
100 TABLET ORAL DAILY
Qty: 30 TABLET | Refills: 1 | Status: SHIPPED | OUTPATIENT
Start: 2023-08-28 | End: 2023-11-03 | Stop reason: SDUPTHER

## 2023-08-28 ASSESSMENT — PATIENT HEALTH QUESTIONNAIRE - PHQ9
2. FEELING DOWN, DEPRESSED OR HOPELESS: NOT AT ALL
CLINICAL INTERPRETATION OF PHQ2 SCORE: NO FURTHER SCREENING NEEDED
SUM OF ALL RESPONSES TO PHQ9 QUESTIONS 1 AND 2: 0
SUM OF ALL RESPONSES TO PHQ9 QUESTIONS 1 AND 2: 0
1. LITTLE INTEREST OR PLEASURE IN DOING THINGS: NOT AT ALL

## 2023-08-29 LAB
CREAT UR-MCNC: 156 MG/DL
PROT UR-MCNC: 38 MG/DL
PROT/CREAT UR: 244 MGPR/GCR

## 2023-08-29 RX ORDER — LOSARTAN POTASSIUM 100 MG/1
100 TABLET ORAL DAILY
Qty: 90 TABLET | OUTPATIENT
Start: 2023-08-29

## 2023-09-05 ENCOUNTER — TELEPHONE (OUTPATIENT)
Dept: INTERNAL MEDICINE | Age: 73
End: 2023-09-05

## 2023-09-06 ENCOUNTER — PATIENT MESSAGE (OUTPATIENT)
Age: 73
End: 2023-09-06

## 2023-09-07 RX ORDER — PREDNISONE 50 MG/1
TABLET ORAL
Qty: 3 TABLET | Refills: 0 | Status: SHIPPED | OUTPATIENT
Start: 2023-09-07

## 2023-09-07 NOTE — TELEPHONE ENCOUNTER
From: Dulce Maria Reed  To: Dr. Carolina Sterling: 9/6/2023 7:30 PM EDT  Subject: Upcoming TTE tests and labs    As information, I have experienced rashes, blood streaks, and hives from previous contrast dyes. Believe these were iodine based. When various tests were completed at [facility] in [place] prior to my bypass surgery, I believe I was given something prior to the tests that eliminated the rashes, etc. and had no side effects afterwards. Your feedback would be appreciated.

## 2023-09-11 DIAGNOSIS — I25.10 CAD, MULTIPLE VESSEL: ICD-10-CM

## 2023-09-11 DIAGNOSIS — E78.5 DYSLIPIDEMIA: Chronic | ICD-10-CM

## 2023-09-11 LAB
ALBUMIN SERPL-MCNC: 3.2 G/DL (ref 3.2–4.6)
ALBUMIN/GLOB SERPL: 1.1 (ref 0.4–1.6)
ALP SERPL-CCNC: 105 U/L (ref 50–136)
ALT SERPL-CCNC: 18 U/L (ref 12–65)
ANION GAP SERPL CALC-SCNC: 5 MMOL/L (ref 2–11)
AST SERPL-CCNC: 15 U/L (ref 15–37)
BILIRUB SERPL-MCNC: 0.5 MG/DL (ref 0.2–1.1)
BUN SERPL-MCNC: 12 MG/DL (ref 8–23)
CALCIUM SERPL-MCNC: 9.4 MG/DL (ref 8.3–10.4)
CHLORIDE SERPL-SCNC: 111 MMOL/L (ref 101–110)
CHOLEST SERPL-MCNC: 85 MG/DL
CO2 SERPL-SCNC: 28 MMOL/L (ref 21–32)
CREAT SERPL-MCNC: 1 MG/DL (ref 0.6–1)
ERYTHROCYTE [DISTWIDTH] IN BLOOD BY AUTOMATED COUNT: 16.4 % (ref 11.9–14.6)
GLOBULIN SER CALC-MCNC: 2.9 G/DL (ref 2.8–4.5)
GLUCOSE SERPL-MCNC: 102 MG/DL (ref 65–100)
HCT VFR BLD AUTO: 39.7 % (ref 35.8–46.3)
HDLC SERPL-MCNC: 41 MG/DL (ref 40–60)
HDLC SERPL: 2.1
HGB BLD-MCNC: 12.1 G/DL (ref 11.7–15.4)
LDLC SERPL CALC-MCNC: 29.4 MG/DL
MCH RBC QN AUTO: 23 PG (ref 26.1–32.9)
MCHC RBC AUTO-ENTMCNC: 30.5 G/DL (ref 31.4–35)
MCV RBC AUTO: 75.5 FL (ref 82–102)
NRBC # BLD: 0 K/UL (ref 0–0.2)
PLATELET # BLD AUTO: 297 K/UL (ref 150–450)
PMV BLD AUTO: 11.4 FL (ref 9.4–12.3)
POTASSIUM SERPL-SCNC: 3.4 MMOL/L (ref 3.5–5.1)
PROT SERPL-MCNC: 6.1 G/DL (ref 6.3–8.2)
RBC # BLD AUTO: 5.26 M/UL (ref 4.05–5.2)
SODIUM SERPL-SCNC: 144 MMOL/L (ref 133–143)
TRIGL SERPL-MCNC: 73 MG/DL (ref 35–150)
VLDLC SERPL CALC-MCNC: 14.6 MG/DL (ref 6–23)
WBC # BLD AUTO: 6.5 K/UL (ref 4.3–11.1)

## 2023-09-12 ENCOUNTER — TELEPHONE (OUTPATIENT)
Age: 73
End: 2023-09-12

## 2023-09-20 ENCOUNTER — OFFICE VISIT (OUTPATIENT)
Dept: INTERNAL MEDICINE | Age: 73
End: 2023-09-20
Attending: INTERNAL MEDICINE

## 2023-09-20 VITALS — DIASTOLIC BLOOD PRESSURE: 74 MMHG | SYSTOLIC BLOOD PRESSURE: 157 MMHG | HEART RATE: 71 BPM

## 2023-09-20 DIAGNOSIS — I10 UNCONTROLLED HYPERTENSION: Primary | ICD-10-CM

## 2023-09-27 ENCOUNTER — OFFICE VISIT (OUTPATIENT)
Age: 73
End: 2023-09-27
Payer: MEDICARE

## 2023-09-27 VITALS
BODY MASS INDEX: 31.07 KG/M2 | HEART RATE: 56 BPM | DIASTOLIC BLOOD PRESSURE: 64 MMHG | HEIGHT: 64 IN | SYSTOLIC BLOOD PRESSURE: 112 MMHG | OXYGEN SATURATION: 98 % | WEIGHT: 182 LBS

## 2023-09-27 DIAGNOSIS — I50.20 HFREF (HEART FAILURE WITH REDUCED EJECTION FRACTION) (HCC): ICD-10-CM

## 2023-09-27 DIAGNOSIS — G89.4 CHRONIC PAIN SYNDROME: Chronic | ICD-10-CM

## 2023-09-27 DIAGNOSIS — E78.5 DYSLIPIDEMIA: Chronic | ICD-10-CM

## 2023-09-27 DIAGNOSIS — I25.10 CAD, MULTIPLE VESSEL: Primary | ICD-10-CM

## 2023-09-27 DIAGNOSIS — I48.0 PAROXYSMAL ATRIAL FIBRILLATION (HCC): ICD-10-CM

## 2023-09-27 DIAGNOSIS — I65.23 CAROTID ATHEROSCLEROSIS, BILATERAL: ICD-10-CM

## 2023-09-27 PROCEDURE — 1123F ACP DISCUSS/DSCN MKR DOCD: CPT | Performed by: INTERNAL MEDICINE

## 2023-09-27 PROCEDURE — 99214 OFFICE O/P EST MOD 30 MIN: CPT | Performed by: INTERNAL MEDICINE

## 2023-09-27 ASSESSMENT — ENCOUNTER SYMPTOMS
COUGH: 0
ABDOMINAL PAIN: 0
BACK PAIN: 0
SHORTNESS OF BREATH: 0

## 2023-09-27 NOTE — PROGRESS NOTES
09/11/2023    CALCIUM 9.4 09/11/2023    PROT 6.1 (L) 09/11/2023    LABALBU 3.2 09/11/2023    BILITOT 0.5 09/11/2023    ALKPHOS 105 09/11/2023    AST 15 09/11/2023    ALT 18 09/11/2023    LABGLOM 59 (L) 09/11/2023    GFRAA >60 10/13/2020    AGRATIO 1.1 (L) 10/10/2020    GLOB 2.9 09/11/2023       Lab Results   Component Value Date/Time     09/11/2023 08:32 AM    K 3.4 09/11/2023 08:32 AM     09/11/2023 08:32 AM    CO2 28 09/11/2023 08:32 AM    BUN 12 09/11/2023 08:32 AM    CREATININE 1.00 09/11/2023 08:32 AM    GLUCOSE 102 09/11/2023 08:32 AM    CALCIUM 9.4 09/11/2023 08:32 AM        Lab Results   Component Value Date    WBC 6.5 09/11/2023    HGB 12.1 09/11/2023    HCT 39.7 09/11/2023    MCV 75.5 (L) 09/11/2023     09/11/2023             ASSESSMENT:    Cesar Salgado was seen today for coronary artery disease. Diagnoses and all orders for this visit:    CAD, multiple vessel - Status post 5 vessel coronary bypass grafting 01/2023. Patient has known mild to moderate LV systolic dysfunction. Medical therapy is appropriate. Patient unfortunately cannot participate in cardiac rehab secondary to chronic back pain and severe deconditioning. Overall more active. HFrEF (heart failure with reduced ejection fraction) (HCC) -known LV systolic dysfunction. Initially LVEF 40-45%. Patient is now status post coronary bypass grafting. Patient is on appropriate optimal medical therapy. Volume status is stable. Repeat echo 09/2023:  EF 50-55%. Kenia Brooke today and monitor. Continue carvedilol, losartan and spironolactone. Paroxysmal atrial fibrillation (HCC) -patient struggled postoperative atrial fibrillation status post YAQUELIN guided cardioversion. She remains anticoagulated with Eliquis. Amiodarone was stopped. No recurrence noted to date. Dyslipidemia -patient is stable on atorvastatin 80 mg nightly.   09/2023:  LDL 29    Carotid atherosclerosis, bilateral -recheck carotid ultrasound in 6

## 2023-10-16 RX ORDER — CARVEDILOL 6.25 MG/1
6.25 TABLET ORAL 2 TIMES DAILY WITH MEALS
Qty: 180 TABLET | Refills: 3 | Status: SHIPPED | OUTPATIENT
Start: 2023-10-16

## 2023-10-16 RX ORDER — POTASSIUM CHLORIDE 20 MEQ/1
20 TABLET, EXTENDED RELEASE ORAL
Qty: 180 TABLET | Refills: 3 | Status: SHIPPED | OUTPATIENT
Start: 2023-10-16

## 2023-10-16 NOTE — TELEPHONE ENCOUNTER
MEDICATION REFILL REQUEST      Name of Medication:  Carvedilol  Dose:  6.25 mg  Frequency:  BID  Quantity:  180  Days' supply:  90      Pharmacy Name/Location:  [address] [place] [address]      Name of Medication:  Klorcon  Dose:  M 20 MEQ  Frequency:  QD  Quantity:  60  Days' supply:  [facility] Name/Location:  [id number]

## 2023-10-26 ENCOUNTER — TELEPHONE (OUTPATIENT)
Dept: ORTHOPEDIC SURGERY | Age: 73
End: 2023-10-26

## 2023-10-26 ENCOUNTER — PATIENT MESSAGE (OUTPATIENT)
Dept: ORTHOPEDIC SURGERY | Age: 73
End: 2023-10-26

## 2023-10-26 NOTE — TELEPHONE ENCOUNTER
Pt  ask  if you can  call her  about  a  refill. Last  visit  was 2022  She is  scheduled  for  an OV  in December. We looked  at  a VV nothing  worked. She  needs to  see TRISHA  not  AM.  Since  [facility]ISHA  referred her  to [facility]-  she has  had  5  cardiology procedures and  wants  to make  TRISHA  aware of  this  information  before  her  visit.     Also may want to move up  sooner  if needed

## 2023-10-30 RX ORDER — TIZANIDINE 2 MG/1
TABLET ORAL
Qty: 45 TABLET | Refills: 0 | Status: SHIPPED | OUTPATIENT
Start: 2023-10-30

## 2023-11-07 RX ORDER — LOSARTAN POTASSIUM 100 MG/1
100 TABLET ORAL DAILY
Qty: 90 TABLET | Refills: 0 | Status: SHIPPED | OUTPATIENT
Start: 2023-11-07

## 2023-11-28 ENCOUNTER — TELEPHONE (OUTPATIENT)
Dept: INTERNAL MEDICINE | Age: 73
End: 2023-11-28

## 2023-12-13 ENCOUNTER — TELEPHONE (OUTPATIENT)
Dept: OTHER | Age: 73
End: 2023-12-13

## 2023-12-15 ENCOUNTER — TELEPHONE (OUTPATIENT)
Dept: OTHER | Age: 73
End: 2023-12-15

## 2023-12-22 DIAGNOSIS — K22.2 GERD WITH STRICTURE: ICD-10-CM

## 2023-12-22 DIAGNOSIS — K21.9 GERD WITH STRICTURE: ICD-10-CM

## 2023-12-22 DIAGNOSIS — K22.10 EROSIVE ESOPHAGITIS: ICD-10-CM

## 2023-12-23 RX ORDER — PANTOPRAZOLE SODIUM 40 MG/1
40 TABLET, DELAYED RELEASE ORAL DAILY
Qty: 90 TABLET | Refills: 0 | Status: SHIPPED | OUTPATIENT
Start: 2023-12-23

## 2023-12-27 ENCOUNTER — TELEPHONE (OUTPATIENT)
Dept: INTERNAL MEDICINE | Age: 73
End: 2023-12-27

## 2024-01-02 ENCOUNTER — OFFICE VISIT (OUTPATIENT)
Dept: ORTHOPEDIC SURGERY | Age: 74
End: 2024-01-02
Payer: MEDICARE

## 2024-01-02 VITALS — BODY MASS INDEX: 31.24 KG/M2 | HEIGHT: 64 IN

## 2024-01-02 DIAGNOSIS — M51.36 DISC DEGENERATION, LUMBAR: ICD-10-CM

## 2024-01-02 DIAGNOSIS — M43.10 ACQUIRED SPONDYLOLISTHESIS: Primary | ICD-10-CM

## 2024-01-02 DIAGNOSIS — M54.16 RADICULOPATHY, LUMBAR REGION: ICD-10-CM

## 2024-01-02 PROCEDURE — 1123F ACP DISCUSS/DSCN MKR DOCD: CPT | Performed by: PHYSICAL MEDICINE & REHABILITATION

## 2024-01-02 PROCEDURE — 99214 OFFICE O/P EST MOD 30 MIN: CPT | Performed by: PHYSICAL MEDICINE & REHABILITATION

## 2024-01-02 RX ORDER — DULOXETIN HYDROCHLORIDE 30 MG/1
30 CAPSULE, DELAYED RELEASE ORAL 2 TIMES DAILY
Qty: 60 CAPSULE | Refills: 5 | Status: SHIPPED | OUTPATIENT
Start: 2024-01-02

## 2024-01-03 NOTE — PROGRESS NOTES
DDD (degenerative disc disease)     \"back\", chronic pain in lower back    GERD (gastroesophageal reflux disease)     controlled w/ OTC med    Psychiatric disorder     PTSD, severe anxiety with medical procedures    Seizure (HCC)     patient denies seizure, states was evaluated by Neurologist and was told she only had syncopal episode    Spondylolisthesis of lumbar region     severe spinal canal stenosis L4-5    Syncope 10/11/2020    Thyroid disease     history of hypo in past. No current issues, no meds   ,   Past Surgical History:   Procedure Laterality Date    CORONARY ARTERY BYPASS GRAFT N/A 1/31/2023    CABG CORONARY ARTERY BYPASS,(CABGX5), LIMA, ENDOSCOPIC GREATER SAPHENOUS VEIN HARVEST LEFT LEG, TRANSESOPHOGEAL ECHOCARDIOGRAM performed by Fuentes Callahan MD at [facility] MAIN OR    JOINT REPLACEMENT Left 2018    SHOULDER SURGERY Left 2019    TOTAL HIP ARTHROPLASTY Right 04/2014     Family History:   Family History   Problem Relation Age of Onset    Diabetes Mother     Osteoarthritis Mother     Osteoarthritis Father     Lung Disease Father     Cancer Mother       Social History:   Social History     Tobacco Use    Smoking status: Never    Smokeless tobacco: Never   Substance Use Topics    Alcohol use: Yes     Comment: rarely only on social occasions       ALLERGIES:   Allergies   Allergen Reactions    Ciprofloxacin Hallucinations     Other reaction(s): Hallucinations-Intolerance  Other reaction(s): Hallucinations-Intolerance  Other reaction(s): Hallucinations-Intolerance      Contrast [Barium-Containing Compounds] Other (See Comments)     \"Blood Poisoning\"  States, Can take if premedicated with benadryl and prednisone    Oxycodone Nausea And Vomiting and Other (See Comments)     \" incoherent \"     Sulfa Antibiotics Rash        Patient Medications    Current Outpatient Medications   Medication Sig    DULoxetine (CYMBALTA) 30 MG extended release capsule Take 1 capsule by mouth 2 times daily    potassium chloride

## 2024-01-10 ENCOUNTER — TELEPHONE (OUTPATIENT)
Dept: INTERNAL MEDICINE | Age: 74
End: 2024-01-10

## 2024-01-10 ENCOUNTER — CASE MANAGEMENT (OUTPATIENT)
Dept: OTHER | Age: 74
End: 2024-01-10

## 2024-01-11 ENCOUNTER — CASE MANAGEMENT (OUTPATIENT)
Dept: OTHER | Age: 74
End: 2024-01-11

## 2024-01-11 DIAGNOSIS — I10 ESSENTIAL HYPERTENSION: ICD-10-CM

## 2024-01-17 RX ORDER — AMLODIPINE BESYLATE 5 MG/1
10 TABLET ORAL DAILY
Qty: 180 TABLET | Refills: 0 | Status: SHIPPED | OUTPATIENT
Start: 2024-01-17 | End: 2024-04-16

## 2024-01-24 ENCOUNTER — TELEPHONE (OUTPATIENT)
Dept: INTERNAL MEDICINE | Age: 74
End: 2024-01-24

## 2024-01-24 RX ORDER — LOSARTAN POTASSIUM 100 MG/1
100 TABLET ORAL DAILY
Qty: 90 TABLET | Refills: 0 | Status: SHIPPED | OUTPATIENT
Start: 2024-01-24

## 2024-02-07 NOTE — TELEPHONE ENCOUNTER
MEDICATION REFILL REQUEST      Name of Medication:  Eliquis  Dose:  5 mg  Frequency:  twice a day   Quantity:    Days' supply:  90      Pharmacy Name/Location:  [facility]

## 2024-03-03 DIAGNOSIS — K22.2 GERD WITH STRICTURE: ICD-10-CM

## 2024-03-03 DIAGNOSIS — K22.10 EROSIVE ESOPHAGITIS: ICD-10-CM

## 2024-03-03 DIAGNOSIS — K21.9 GERD WITH STRICTURE: ICD-10-CM

## 2024-03-05 RX ORDER — PANTOPRAZOLE SODIUM 40 MG/1
40 TABLET, DELAYED RELEASE ORAL DAILY
Qty: 90 TABLET | Refills: 3 | OUTPATIENT
Start: 2024-03-05

## 2024-03-06 DIAGNOSIS — Z12.31 VISIT FOR SCREENING MAMMOGRAM: Primary | ICD-10-CM

## 2024-03-21 ENCOUNTER — HOSPITAL ENCOUNTER (OUTPATIENT)
Dept: MAMMOGRAPHY | Age: 74
Discharge: HOME | End: 2024-03-21

## 2024-03-21 DIAGNOSIS — Z12.31 VISIT FOR SCREENING MAMMOGRAM: ICD-10-CM

## 2024-03-21 PROCEDURE — 77067 SCR MAMMO BI INCL CAD: CPT

## 2024-03-27 ENCOUNTER — OFFICE VISIT (OUTPATIENT)
Age: 74
End: 2024-03-27
Payer: MEDICARE

## 2024-03-27 VITALS
WEIGHT: 187 LBS | HEIGHT: 64 IN | SYSTOLIC BLOOD PRESSURE: 138 MMHG | DIASTOLIC BLOOD PRESSURE: 98 MMHG | HEART RATE: 69 BPM | BODY MASS INDEX: 31.92 KG/M2

## 2024-03-27 DIAGNOSIS — I48.0 PAROXYSMAL ATRIAL FIBRILLATION (HCC): ICD-10-CM

## 2024-03-27 DIAGNOSIS — I65.23 CAROTID ATHEROSCLEROSIS, BILATERAL: ICD-10-CM

## 2024-03-27 DIAGNOSIS — I50.20 HFREF (HEART FAILURE WITH REDUCED EJECTION FRACTION) (HCC): Primary | ICD-10-CM

## 2024-03-27 DIAGNOSIS — E78.00 HYPERCHOLESTEREMIA: Chronic | ICD-10-CM

## 2024-03-27 DIAGNOSIS — I25.10 CAD, MULTIPLE VESSEL: ICD-10-CM

## 2024-03-27 PROCEDURE — 93000 ELECTROCARDIOGRAM COMPLETE: CPT | Performed by: INTERNAL MEDICINE

## 2024-03-27 PROCEDURE — 99214 OFFICE O/P EST MOD 30 MIN: CPT | Performed by: INTERNAL MEDICINE

## 2024-03-27 PROCEDURE — 1123F ACP DISCUSS/DSCN MKR DOCD: CPT | Performed by: INTERNAL MEDICINE

## 2024-03-27 RX ORDER — SPIRONOLACTONE 25 MG/1
25 TABLET ORAL DAILY
Qty: 90 TABLET | Refills: 3 | Status: SHIPPED | OUTPATIENT
Start: 2024-03-27

## 2024-03-27 ASSESSMENT — ENCOUNTER SYMPTOMS
ABDOMINAL PAIN: 0
SHORTNESS OF BREATH: 0
COUGH: 0
BACK PAIN: 0

## 2024-03-27 NOTE — PROGRESS NOTES
[facility] CARDIOLOGY  [address] [place] [address], [address]  [place] [address]      24      NAME:  Allison Mcdonald  : 1950  MRN: [id number]      SUBJECTIVE:   Allison Mcdonald is a 74 y.o. female seen for a follow up visit regarding the following:     Chief Complaint   Patient presents with    6 Month Follow-Up       HPI:   74 y.o. female with history of chronic pain syndrome secondary to extensive cervical and lumbar spine disease.  Patient developed severe substernal chest discomfort and was subsequently diagnosed with multi vessel CAD status post coronary bypass grafting 2023.  She has mild to moderate reduction in LV systolic function with LVEF 40-45%.  She struggled postoperatively with volume retention and A-fib.  She remains in sinus rhythm.  She still struggles with extensive fatigue, weakness and chronic pain.  Patient unfortunately unable to participate in physical therapy or cardiac rehab secondary to chronic pain.  Repeat echo 2023 with EF 50-55%.        Past Medical History, Past Surgical History, Family history, Social History, and Medications were all reviewed with the patient today and updated as necessary.     Current Outpatient Medications   Medication Sig Dispense Refill    spironolactone (ALDACTONE) 25 MG tablet Take 1 tablet by mouth daily 90 tablet 3    apixaban (ELIQUIS) 5 MG TABS tablet Take 1 tablet by mouth 2 times daily 180 tablet 3    DULoxetine (CYMBALTA) 30 MG extended release capsule Take 1 capsule by mouth 2 times daily (Patient taking differently: Take 1 capsule by mouth daily) 60 capsule 5    carvedilol (COREG) 6.25 MG tablet Take 1 tablet by mouth 2 times daily (with meals) 180 tablet 3    potassium chloride (KLOR-CON M) 20 MEQ extended release tablet Take 1 tablet by mouth daily (with breakfast) 180 tablet 3    losartan (COZAAR) 25 MG tablet Take 1 tablet by mouth daily 90 tablet 3    atorvastatin (LIPITOR) 80 MG tablet Take 1 tablet by mouth nightly 90 tablet 3

## 2024-04-02 ENCOUNTER — APPOINTMENT (OUTPATIENT)
Dept: INTERNAL MEDICINE | Age: 74
End: 2024-04-02

## 2024-04-08 DIAGNOSIS — M43.10 ACQUIRED SPONDYLOLISTHESIS: ICD-10-CM

## 2024-04-08 RX ORDER — DULOXETIN HYDROCHLORIDE 30 MG/1
30 CAPSULE, DELAYED RELEASE ORAL 2 TIMES DAILY
Qty: 180 CAPSULE | Refills: 2 | OUTPATIENT
Start: 2024-04-08

## 2024-04-23 ENCOUNTER — LAB SERVICES (OUTPATIENT)
Dept: LAB | Age: 74
End: 2024-04-23
Attending: INTERNAL MEDICINE

## 2024-04-23 ENCOUNTER — OFFICE VISIT (OUTPATIENT)
Dept: INTERNAL MEDICINE | Age: 74
End: 2024-04-23
Attending: INTERNAL MEDICINE

## 2024-04-23 VITALS
TEMPERATURE: 97.4 F | SYSTOLIC BLOOD PRESSURE: 145 MMHG | HEART RATE: 73 BPM | DIASTOLIC BLOOD PRESSURE: 71 MMHG | BODY MASS INDEX: 37.05 KG/M2 | WEIGHT: 217 LBS | HEIGHT: 64 IN

## 2024-04-23 DIAGNOSIS — H25.819 COMBINED FORM OF SENILE CATARACT, UNSPECIFIED LATERALITY: ICD-10-CM

## 2024-04-23 DIAGNOSIS — E55.9 VITAMIN D DEFICIENCY: ICD-10-CM

## 2024-04-23 DIAGNOSIS — E66.01 MORBID OBESITY  (CMD): ICD-10-CM

## 2024-04-23 DIAGNOSIS — N18.2 CKD (CHRONIC KIDNEY DISEASE) STAGE 2, GFR 60-89 ML/MIN: ICD-10-CM

## 2024-04-23 DIAGNOSIS — I10 ESSENTIAL HYPERTENSION: ICD-10-CM

## 2024-04-23 DIAGNOSIS — N60.02 BREAST CYST, LEFT: ICD-10-CM

## 2024-04-23 DIAGNOSIS — I89.0 LYMPHEDEMA OF BOTH LOWER EXTREMITIES: ICD-10-CM

## 2024-04-23 DIAGNOSIS — K22.2 GERD WITH STRICTURE: ICD-10-CM

## 2024-04-23 DIAGNOSIS — K21.9 GERD WITH STRICTURE: ICD-10-CM

## 2024-04-23 DIAGNOSIS — I10 ESSENTIAL HYPERTENSION: Primary | ICD-10-CM

## 2024-04-23 LAB
25(OH)D3+25(OH)D2 SERPL-MCNC: 28.7 NG/ML (ref 30–100)
ANION GAP SERPL CALC-SCNC: 8 MMOL/L (ref 7–19)
BUN SERPL-MCNC: 16 MG/DL (ref 6–20)
BUN/CREAT SERPL: 13 (ref 7–25)
CALCIUM SERPL-MCNC: 10 MG/DL (ref 8.4–10.2)
CHLORIDE SERPL-SCNC: 109 MMOL/L (ref 97–110)
CHOLEST SERPL-MCNC: 230 MG/DL
CHOLEST/HDLC SERPL: 3.6 {RATIO}
CO2 SERPL-SCNC: 26 MMOL/L (ref 21–32)
CREAT SERPL-MCNC: 1.19 MG/DL (ref 0.51–0.95)
DEPRECATED RDW RBC: 48.5 FL (ref 39–50)
EGFRCR SERPLBLD CKD-EPI 2021: 48 ML/MIN/{1.73_M2}
ERYTHROCYTE [DISTWIDTH] IN BLOOD: 14.3 % (ref 11–15)
FASTING DURATION TIME PATIENT: ABNORMAL H
GLUCOSE SERPL-MCNC: 95 MG/DL (ref 70–99)
HCT VFR BLD CALC: 38.6 % (ref 36–46.5)
HDLC SERPL-MCNC: 64 MG/DL
HGB BLD-MCNC: 12.2 G/DL (ref 12–15.5)
LDLC SERPL CALC-MCNC: 144 MG/DL
MCH RBC QN AUTO: 29.5 PG (ref 26–34)
MCHC RBC AUTO-ENTMCNC: 31.6 G/DL (ref 32–36.5)
MCV RBC AUTO: 93.2 FL (ref 78–100)
NONHDLC SERPL-MCNC: 166 MG/DL
NRBC BLD MANUAL-RTO: 0 /100 WBC
PLATELET # BLD AUTO: 349 K/MCL (ref 140–450)
POTASSIUM SERPL-SCNC: 4 MMOL/L (ref 3.4–5.1)
RBC # BLD: 4.14 MIL/MCL (ref 4–5.2)
SODIUM SERPL-SCNC: 139 MMOL/L (ref 135–145)
TRIGL SERPL-MCNC: 108 MG/DL
WBC # BLD: 8.7 K/MCL (ref 4.2–11)

## 2024-04-23 PROCEDURE — 85027 COMPLETE CBC AUTOMATED: CPT

## 2024-04-23 PROCEDURE — 80048 BASIC METABOLIC PNL TOTAL CA: CPT

## 2024-04-23 PROCEDURE — 80061 LIPID PANEL: CPT

## 2024-04-23 PROCEDURE — 99214 OFFICE O/P EST MOD 30 MIN: CPT

## 2024-04-23 PROCEDURE — 82306 VITAMIN D 25 HYDROXY: CPT

## 2024-04-23 PROCEDURE — 84156 ASSAY OF PROTEIN URINE: CPT

## 2024-04-23 RX ORDER — HYDROCHLOROTHIAZIDE 12.5 MG/1
12.5 TABLET ORAL DAILY
Qty: 90 TABLET | Refills: 3 | Status: SHIPPED | OUTPATIENT
Start: 2024-04-23 | End: 2024-04-26 | Stop reason: SDUPTHER

## 2024-04-24 LAB
CREAT UR-MCNC: 181 MG/DL
PROT UR-MCNC: 30 MG/DL
PROT/CREAT UR: 166 MGPR/GCR

## 2024-04-26 ENCOUNTER — TELEPHONE (OUTPATIENT)
Dept: INTERNAL MEDICINE | Age: 74
End: 2024-04-26

## 2024-04-26 ENCOUNTER — TELEPHONE (OUTPATIENT)
Dept: PULMONOLOGY | Age: 74
End: 2024-04-26

## 2024-04-26 DIAGNOSIS — E78.2 HYPERLIPIDEMIA, MIXED: ICD-10-CM

## 2024-04-26 DIAGNOSIS — E55.9 VITAMIN D DEFICIENCY: Primary | ICD-10-CM

## 2024-04-26 DIAGNOSIS — I10 ESSENTIAL HYPERTENSION: ICD-10-CM

## 2024-04-26 RX ORDER — CHOLECALCIFEROL (VITAMIN D3) 50 MCG
50 TABLET ORAL DAILY
Qty: 42 TABLET | Refills: 0 | Status: SHIPPED | OUTPATIENT
Start: 2024-04-26 | End: 2024-06-07

## 2024-04-26 RX ORDER — AMLODIPINE BESYLATE 5 MG/1
10 TABLET ORAL DAILY
Qty: 180 TABLET | Refills: 0 | Status: SHIPPED | OUTPATIENT
Start: 2024-04-26 | End: 2024-07-25

## 2024-04-26 RX ORDER — LOSARTAN POTASSIUM 100 MG/1
100 TABLET ORAL DAILY
Qty: 90 TABLET | Refills: 0 | Status: SHIPPED | OUTPATIENT
Start: 2024-04-26

## 2024-04-26 RX ORDER — HYDROCHLOROTHIAZIDE 12.5 MG/1
12.5 TABLET ORAL DAILY
Qty: 90 TABLET | Refills: 3 | Status: SHIPPED | OUTPATIENT
Start: 2024-04-26

## 2024-04-26 RX ORDER — ROSUVASTATIN CALCIUM 20 MG/1
20 TABLET, COATED ORAL DAILY
Qty: 90 TABLET | Refills: 3 | Status: SHIPPED | OUTPATIENT
Start: 2024-04-26

## 2024-04-28 RX ORDER — LOSARTAN POTASSIUM 100 MG/1
100 TABLET ORAL DAILY
Qty: 90 TABLET | Refills: 0 | Status: SHIPPED | OUTPATIENT
Start: 2024-04-28

## 2024-05-06 DIAGNOSIS — M43.10 ACQUIRED SPONDYLOLISTHESIS: ICD-10-CM

## 2024-05-06 RX ORDER — DULOXETIN HYDROCHLORIDE 30 MG/1
30 CAPSULE, DELAYED RELEASE ORAL 2 TIMES DAILY
Qty: 180 CAPSULE | Refills: 2 | OUTPATIENT
Start: 2024-05-06

## 2024-06-25 ENCOUNTER — PATIENT OUTREACH (OUTPATIENT)
Dept: INTERNAL MEDICINE | Age: 74
End: 2024-06-25

## 2024-07-09 DIAGNOSIS — M43.10 ACQUIRED SPONDYLOLISTHESIS: ICD-10-CM

## 2024-07-09 RX ORDER — DULOXETIN HYDROCHLORIDE 30 MG/1
30 CAPSULE, DELAYED RELEASE ORAL 2 TIMES DAILY
Qty: 60 CAPSULE | Refills: 5 | OUTPATIENT
Start: 2024-07-09

## 2024-07-15 ENCOUNTER — TELEPHONE (OUTPATIENT)
Dept: INTERNAL MEDICINE | Age: 74
End: 2024-07-15

## 2024-07-19 DIAGNOSIS — I10 ESSENTIAL HYPERTENSION: ICD-10-CM

## 2024-07-23 ENCOUNTER — OFF PREMISE CHARGES (OUTPATIENT)
Dept: INTERNAL MEDICINE | Age: 74
End: 2024-07-23

## 2024-07-23 DIAGNOSIS — I89.0 LYMPHEDEMA OF BOTH LOWER EXTREMITIES: ICD-10-CM

## 2024-07-23 DIAGNOSIS — M17.0 PRIMARY OSTEOARTHRITIS OF BOTH KNEES: Primary | ICD-10-CM

## 2024-07-23 DIAGNOSIS — G31.84 MILD NEUROCOGNITIVE DISORDER: ICD-10-CM

## 2024-07-23 DIAGNOSIS — I10 ESSENTIAL HYPERTENSION: ICD-10-CM

## 2024-07-23 DIAGNOSIS — H40.053 BILATERAL OCULAR HYPERTENSION: ICD-10-CM

## 2024-08-03 RX ORDER — AMLODIPINE BESYLATE 5 MG/1
10 TABLET ORAL DAILY
Qty: 180 TABLET | Refills: 0 | Status: SHIPPED | OUTPATIENT
Start: 2024-08-03 | End: 2024-11-01

## 2024-08-03 RX ORDER — LOSARTAN POTASSIUM 100 MG/1
100 TABLET ORAL DAILY
Qty: 90 TABLET | Refills: 0 | Status: SHIPPED | OUTPATIENT
Start: 2024-08-03

## 2024-08-06 ENCOUNTER — OFFICE VISIT (OUTPATIENT)
Dept: INTERNAL MEDICINE | Age: 74
End: 2024-08-06
Attending: INTERNAL MEDICINE

## 2024-08-06 VITALS
OXYGEN SATURATION: 100 % | SYSTOLIC BLOOD PRESSURE: 151 MMHG | DIASTOLIC BLOOD PRESSURE: 72 MMHG | HEART RATE: 82 BPM | BODY MASS INDEX: 38.41 KG/M2 | RESPIRATION RATE: 14 BRPM | HEIGHT: 64 IN | TEMPERATURE: 98.6 F | WEIGHT: 225 LBS

## 2024-08-06 DIAGNOSIS — I10 ESSENTIAL HYPERTENSION: ICD-10-CM

## 2024-08-06 DIAGNOSIS — E55.9 VITAMIN D DEFICIENCY: ICD-10-CM

## 2024-08-06 DIAGNOSIS — Z23 NEED FOR SHINGLES VACCINE: ICD-10-CM

## 2024-08-06 DIAGNOSIS — H40.053 BILATERAL OCULAR HYPERTENSION: ICD-10-CM

## 2024-08-06 DIAGNOSIS — H25.813 COMBINED FORMS OF AGE-RELATED CATARACT OF BOTH EYES: ICD-10-CM

## 2024-08-06 DIAGNOSIS — Z00.00 MEDICARE ANNUAL WELLNESS VISIT, SUBSEQUENT: Primary | ICD-10-CM

## 2024-08-06 DIAGNOSIS — R30.0 DYSURIA: ICD-10-CM

## 2024-08-06 DIAGNOSIS — Z13.31 SCREENING FOR DEPRESSION: ICD-10-CM

## 2024-08-06 LAB
ANION GAP SERPL CALC-SCNC: 11 MMOL/L (ref 7–19)
APPEARANCE UR: CLEAR
BACTERIA #/AREA URNS HPF: ABNORMAL /HPF
BILIRUB UR QL STRIP: NEGATIVE
BUN SERPL-MCNC: 19 MG/DL (ref 6–20)
BUN/CREAT SERPL: 18 (ref 7–25)
CALCIUM SERPL-MCNC: 9.5 MG/DL (ref 8.4–10.2)
CHLORIDE SERPL-SCNC: 108 MMOL/L (ref 97–110)
CO2 SERPL-SCNC: 24 MMOL/L (ref 21–32)
COLOR UR: YELLOW
CREAT SERPL-MCNC: 1.05 MG/DL (ref 0.51–0.95)
EGFRCR SERPLBLD CKD-EPI 2021: 55 ML/MIN/{1.73_M2}
FASTING DURATION TIME PATIENT: ABNORMAL H
GLUCOSE SERPL-MCNC: 99 MG/DL (ref 70–99)
GLUCOSE UR STRIP-MCNC: NEGATIVE MG/DL
HGB UR QL STRIP: NEGATIVE
HYALINE CASTS #/AREA URNS LPF: ABNORMAL /LPF
KETONES UR STRIP-MCNC: NEGATIVE MG/DL
LEUKOCYTE ESTERASE UR QL STRIP: NEGATIVE
MUCOUS THREADS URNS QL MICRO: PRESENT
NITRITE UR QL STRIP: NEGATIVE
PH UR STRIP: 6.5 [PH] (ref 5–7)
POTASSIUM SERPL-SCNC: 4.2 MMOL/L (ref 3.4–5.1)
PROT UR STRIP-MCNC: 30 MG/DL
RBC #/AREA URNS HPF: ABNORMAL /HPF
SODIUM SERPL-SCNC: 139 MMOL/L (ref 135–145)
SP GR UR STRIP: 1.02 (ref 1–1.03)
SQUAMOUS #/AREA URNS HPF: ABNORMAL /HPF
UROBILINOGEN UR STRIP-MCNC: 2 MG/DL
WBC #/AREA URNS HPF: ABNORMAL /HPF

## 2024-08-06 PROCEDURE — 80048 BASIC METABOLIC PNL TOTAL CA: CPT

## 2024-08-06 PROCEDURE — 84156 ASSAY OF PROTEIN URINE: CPT

## 2024-08-06 PROCEDURE — 81001 URINALYSIS AUTO W/SCOPE: CPT

## 2024-08-06 PROCEDURE — 99211 OFF/OP EST MAY X REQ PHY/QHP: CPT

## 2024-08-06 PROCEDURE — G0439 PPPS, SUBSEQ VISIT: HCPCS

## 2024-08-06 PROCEDURE — 99214 OFFICE O/P EST MOD 30 MIN: CPT

## 2024-08-06 ASSESSMENT — PATIENT HEALTH QUESTIONNAIRE - PHQ9
SUM OF ALL RESPONSES TO PHQ9 QUESTIONS 1 AND 2: 1
CLINICAL INTERPRETATION OF PHQ2 SCORE: NO FURTHER SCREENING NEEDED
2. FEELING DOWN, DEPRESSED OR HOPELESS: NOT AT ALL
SUM OF ALL RESPONSES TO PHQ9 QUESTIONS 1 AND 2: 0
SUM OF ALL RESPONSES TO PHQ9 QUESTIONS 1 AND 2: 1
SUM OF ALL RESPONSES TO PHQ9 QUESTIONS 1 AND 2: 0
2. FEELING DOWN, DEPRESSED OR HOPELESS: SEVERAL DAYS
1. LITTLE INTEREST OR PLEASURE IN DOING THINGS: NOT AT ALL
1. LITTLE INTEREST OR PLEASURE IN DOING THINGS: NOT AT ALL
CLINICAL INTERPRETATION OF PHQ2 SCORE: NO FURTHER SCREENING NEEDED

## 2024-08-07 LAB
CREAT UR-MCNC: 228 MG/DL
PROT UR-MCNC: 59 MG/DL
PROT/CREAT UR: 259 MGPR/GCR

## 2024-08-26 RX ORDER — ATORVASTATIN CALCIUM 80 MG/1
80 TABLET, FILM COATED ORAL NIGHTLY
Qty: 90 TABLET | Refills: 3 | Status: SHIPPED | OUTPATIENT
Start: 2024-08-26

## 2024-08-26 NOTE — TELEPHONE ENCOUNTER
Prescription sent to pharmacy//[facility]  Requested Prescriptions     Signed Prescriptions Disp Refills    atorvastatin (LIPITOR) 80 MG tablet 90 tablet 3     Sig: TAKE 1 TABLET BY MOUTH EVERY DAY AT NIGHT     Authorizing Provider: KRISTAN CALERO     Ordering User: VICTOR HUGO APODACA

## 2024-08-26 NOTE — TELEPHONE ENCOUNTER
Requested Prescriptions     Pending Prescriptions Disp Refills    atorvastatin (LIPITOR) 80 MG tablet [Pharmacy Med Name: ATORVASTATIN 80 MG TABLET] 90 tablet 3     Sig: TAKE 1 TABLET BY MOUTH EVERY DAY AT NIGHT     Rx verified last ov 3/27/24

## 2024-09-03 ENCOUNTER — TELEPHONE (OUTPATIENT)
Dept: OTHER | Age: 74
End: 2024-09-03

## 2024-09-05 ENCOUNTER — TELEPHONE (OUTPATIENT)
Dept: OTHER | Age: 74
End: 2024-09-05

## 2024-10-14 DIAGNOSIS — I25.10 CAD, MULTIPLE VESSEL: ICD-10-CM

## 2024-10-14 DIAGNOSIS — E78.00 HYPERCHOLESTEREMIA: Chronic | ICD-10-CM

## 2024-10-14 LAB
ALBUMIN SERPL-MCNC: 3.8 G/DL (ref 3.2–4.6)
ALBUMIN/GLOB SERPL: 1.6 (ref 1–1.9)
ALP SERPL-CCNC: 81 U/L (ref 35–104)
ALT SERPL-CCNC: 11 U/L (ref 8–45)
ANION GAP SERPL CALC-SCNC: 8 MMOL/L (ref 9–18)
AST SERPL-CCNC: 15 U/L (ref 15–37)
BILIRUB SERPL-MCNC: 0.4 MG/DL (ref 0–1.2)
BUN SERPL-MCNC: 23 MG/DL (ref 8–23)
CALCIUM SERPL-MCNC: 9.9 MG/DL (ref 8.8–10.2)
CHLORIDE SERPL-SCNC: 106 MMOL/L (ref 98–107)
CHOLEST SERPL-MCNC: 105 MG/DL (ref 0–200)
CO2 SERPL-SCNC: 25 MMOL/L (ref 20–28)
CREAT SERPL-MCNC: 0.89 MG/DL (ref 0.6–1.1)
ERYTHROCYTE [DISTWIDTH] IN BLOOD BY AUTOMATED COUNT: 13.6 % (ref 11.9–14.6)
GLOBULIN SER CALC-MCNC: 2.3 G/DL (ref 2.3–3.5)
GLUCOSE SERPL-MCNC: 99 MG/DL (ref 70–99)
HCT VFR BLD AUTO: 42.9 % (ref 35.8–46.3)
HDLC SERPL-MCNC: 51 MG/DL (ref 40–60)
HDLC SERPL: 2.1 (ref 0–5)
HGB BLD-MCNC: 13.7 G/DL (ref 11.7–15.4)
LDLC SERPL CALC-MCNC: 37 MG/DL (ref 0–100)
MCH RBC QN AUTO: 26.7 PG (ref 26.1–32.9)
MCHC RBC AUTO-ENTMCNC: 31.9 G/DL (ref 31.4–35)
MCV RBC AUTO: 83.6 FL (ref 82–102)
NRBC # BLD: 0 K/UL (ref 0–0.2)
PLATELET # BLD AUTO: 232 K/UL (ref 150–450)
PMV BLD AUTO: 11.5 FL (ref 9.4–12.3)
POTASSIUM SERPL-SCNC: 4.3 MMOL/L (ref 3.5–5.1)
PROT SERPL-MCNC: 6 G/DL (ref 6.3–8.2)
RBC # BLD AUTO: 5.13 M/UL (ref 4.05–5.2)
SODIUM SERPL-SCNC: 140 MMOL/L (ref 136–145)
TRIGL SERPL-MCNC: 85 MG/DL (ref 0–150)
VLDLC SERPL CALC-MCNC: 17 MG/DL (ref 6–23)
WBC # BLD AUTO: 6.2 K/UL (ref 4.3–11.1)

## 2024-10-15 RX ORDER — SPIRONOLACTONE 25 MG/1
12.5 TABLET ORAL DAILY
Qty: 45 TABLET | Refills: 3 | Status: SHIPPED | OUTPATIENT
Start: 2024-10-15

## 2024-10-18 ENCOUNTER — APPOINTMENT (OUTPATIENT)
Dept: OPHTHALMOLOGY | Age: 74
End: 2024-10-18
Attending: INTERNAL MEDICINE

## 2024-10-18 DIAGNOSIS — H25.813 COMBINED FORM OF AGE-RELATED CATARACT, BOTH EYES: Primary | ICD-10-CM

## 2024-10-18 DIAGNOSIS — H52.4 ASTIGMATISM WITH PRESBYOPIA, BILATERAL: ICD-10-CM

## 2024-10-18 DIAGNOSIS — H52.203 ASTIGMATISM WITH PRESBYOPIA, BILATERAL: ICD-10-CM

## 2024-10-18 ASSESSMENT — CONF VISUAL FIELD
OS_SUPERIOR_TEMPORAL_RESTRICTION: 0
OS_NORMAL: 1
OS_INFERIOR_TEMPORAL_RESTRICTION: 0
METHOD: COUNTING FINGERS
OS_SUPERIOR_NASAL_RESTRICTION: 0
OD_NORMAL: 1
OD_SUPERIOR_NASAL_RESTRICTION: 0
OS_INFERIOR_NASAL_RESTRICTION: 0
OD_INFERIOR_TEMPORAL_RESTRICTION: 0
OD_SUPERIOR_TEMPORAL_RESTRICTION: 0
OD_INFERIOR_NASAL_RESTRICTION: 0

## 2024-10-18 ASSESSMENT — EXTERNAL EXAM - LEFT EYE: OS_EXAM: NORMAL

## 2024-10-18 ASSESSMENT — TONOMETRY
OD_IOP_MMHG: 14
OS_IOP_MMHG: 16
IOP_METHOD: NON-CONTACT AIR PUFF

## 2024-10-18 ASSESSMENT — EXTERNAL EXAM - RIGHT EYE: OD_EXAM: NORMAL

## 2024-10-18 ASSESSMENT — REFRACTION_MANIFEST
OS_AXIS: 069
METHOD_AUTOREFRACTION: 1
OD_CYLINDER: +1.75
OD_SPHERE: -1.50
OD_AXIS: 002
OS_CYLINDER: +1.50
OS_SPHERE: -0.50

## 2024-10-18 ASSESSMENT — SLIT LAMP EXAM - LIDS
COMMENTS: 1+ DERMATOCHALASIS - UPPER LID
COMMENTS: 1+ DERMATOCHALASIS - UPPER LID

## 2024-10-18 ASSESSMENT — VISUAL ACUITY
OS_SC+: -1
OD_SC: 20/50
OS_PH_SC: 20/40
OS_SC: 20/100
OD_SC: 20/50
OD_PH_SC+: -1
OS_PH_SC+: -1
OD_PH_SC: 20/30
OS_SC: 20/50
METHOD: SNELLEN - LINEAR

## 2024-10-18 ASSESSMENT — CUP TO DISC RATIO
OS_RATIO: 0.35
OD_RATIO: 0.35

## 2024-10-21 ENCOUNTER — OFFICE VISIT (OUTPATIENT)
Age: 74
End: 2024-10-21
Payer: MEDICARE

## 2024-10-21 VITALS
HEIGHT: 64 IN | SYSTOLIC BLOOD PRESSURE: 102 MMHG | WEIGHT: 199.2 LBS | HEART RATE: 60 BPM | DIASTOLIC BLOOD PRESSURE: 62 MMHG | BODY MASS INDEX: 34.01 KG/M2

## 2024-10-21 DIAGNOSIS — I48.0 PAROXYSMAL ATRIAL FIBRILLATION (HCC): ICD-10-CM

## 2024-10-21 DIAGNOSIS — G89.4 CHRONIC PAIN SYNDROME: Chronic | ICD-10-CM

## 2024-10-21 DIAGNOSIS — R55 SYNCOPE AND COLLAPSE: Chronic | ICD-10-CM

## 2024-10-21 DIAGNOSIS — E78.00 HYPERCHOLESTEREMIA: Chronic | ICD-10-CM

## 2024-10-21 DIAGNOSIS — I65.23 CAROTID ATHEROSCLEROSIS, BILATERAL: ICD-10-CM

## 2024-10-21 DIAGNOSIS — I25.10 CAD, MULTIPLE VESSEL: Primary | ICD-10-CM

## 2024-10-21 DIAGNOSIS — I50.20 HFREF (HEART FAILURE WITH REDUCED EJECTION FRACTION) (HCC): ICD-10-CM

## 2024-10-21 PROCEDURE — 1123F ACP DISCUSS/DSCN MKR DOCD: CPT | Performed by: INTERNAL MEDICINE

## 2024-10-21 PROCEDURE — 99214 OFFICE O/P EST MOD 30 MIN: CPT | Performed by: INTERNAL MEDICINE

## 2024-10-21 RX ORDER — FUROSEMIDE 20 MG/1
20 TABLET ORAL AS NEEDED
Qty: 30 TABLET | Refills: 11 | Status: SHIPPED | OUTPATIENT
Start: 2024-10-21

## 2024-10-21 RX ORDER — SPIRONOLACTONE 25 MG/1
25 TABLET ORAL DAILY
Qty: 90 TABLET | Refills: 3 | Status: SHIPPED | OUTPATIENT
Start: 2024-10-21

## 2024-10-21 ASSESSMENT — ENCOUNTER SYMPTOMS
COUGH: 0
BACK PAIN: 0
ABDOMINAL PAIN: 0
SHORTNESS OF BREATH: 0

## 2024-10-21 NOTE — PROGRESS NOTES
[facility] CARDIOLOGY  [address] [place] [address], [address]  [place] [address]      10/21/24      NAME:  Allison Mcdonadl  : 1950  MRN: [id number]      SUBJECTIVE:   Allison Mcdonald is a 74 y.o. female seen for a follow up visit regarding the following:     Chief Complaint   Patient presents with    Coronary Artery Disease    Hyperlipidemia    Irregular Heart Beat       HPI:   74 y.o. female with history of chronic pain syndrome secondary to extensive cervical and lumbar spine disease.  Patient developed severe substernal chest discomfort and was subsequently diagnosed with multi vessel CAD status post coronary bypass grafting 2023.  She has mild to moderate reduction in LV systolic function with LVEF 40-45%.  She struggled postoperatively with volume retention and A-fib.  She remains in sinus rhythm.  She still struggles with extensive fatigue, weakness and chronic pain.  Patient unfortunately unable to participate in physical therapy or cardiac rehab secondary to chronic pain.  Repeat echo 2023 with EF 50-55%.        Past Medical History, Past Surgical History, Family history, Social History, and Medications were all reviewed with the patient today and updated as necessary.     Current Outpatient Medications   Medication Sig Dispense Refill    spironolactone (ALDACTONE) 25 MG tablet Take 1 tablet by mouth daily 90 tablet 3    furosemide (LASIX) 20 MG tablet Take 1 tablet by mouth as needed (fluid retention) 30 tablet 11    atorvastatin (LIPITOR) 80 MG tablet TAKE 1 TABLET BY MOUTH EVERY DAY AT NIGHT 90 tablet 3    apixaban (ELIQUIS) 5 MG TABS tablet Take 1 tablet by mouth 2 times daily 180 tablet 3    DULoxetine (CYMBALTA) 30 MG extended release capsule Take 1 capsule by mouth 2 times daily (Patient taking differently: Take 1 capsule by mouth daily) 60 capsule 5    tiZANidine (ZANAFLEX) 2 MG tablet Take 1/2 to 1 tablet by mouth tid, prn 45 tablet 0    carvedilol (COREG) 6.25 MG tablet Take 1 tablet by

## 2024-10-22 ENCOUNTER — HOSPITAL ENCOUNTER (OUTPATIENT)
Dept: OUTPATIENT SERVICES | Age: 74
Discharge: HOME | End: 2024-10-22
Attending: INTERNAL MEDICINE

## 2024-10-22 VITALS
DIASTOLIC BLOOD PRESSURE: 76 MMHG | WEIGHT: 225.13 LBS | HEART RATE: 84 BPM | BODY MASS INDEX: 38.44 KG/M2 | HEIGHT: 64 IN | SYSTOLIC BLOOD PRESSURE: 139 MMHG

## 2024-10-22 DIAGNOSIS — N95.2 VAGINAL ATROPHY: ICD-10-CM

## 2024-10-22 DIAGNOSIS — E66.01 CLASS 2 SEVERE OBESITY DUE TO EXCESS CALORIES WITH SERIOUS COMORBIDITY AND BODY MASS INDEX (BMI) OF 38.0 TO 38.9 IN ADULT (CMD): ICD-10-CM

## 2024-10-22 DIAGNOSIS — R39.89 URETHRAL PAIN: ICD-10-CM

## 2024-10-22 DIAGNOSIS — N39.41 URGENCY INCONTINENCE: ICD-10-CM

## 2024-10-22 DIAGNOSIS — G47.8 NON-RESTORATIVE SLEEP: ICD-10-CM

## 2024-10-22 DIAGNOSIS — R06.83 SNORING: Primary | ICD-10-CM

## 2024-10-22 DIAGNOSIS — E66.812 CLASS 2 SEVERE OBESITY DUE TO EXCESS CALORIES WITH SERIOUS COMORBIDITY AND BODY MASS INDEX (BMI) OF 38.0 TO 38.9 IN ADULT (CMD): ICD-10-CM

## 2024-10-22 DIAGNOSIS — M62.89 HIGH-TONE PELVIC FLOOR DYSFUNCTION: ICD-10-CM

## 2024-10-22 DIAGNOSIS — R35.1 NOCTURIA: ICD-10-CM

## 2024-10-22 DIAGNOSIS — K59.00 CONSTIPATION, UNSPECIFIED CONSTIPATION TYPE: ICD-10-CM

## 2024-10-22 DIAGNOSIS — Z91.89 AT RISK FOR SLEEP APNEA: ICD-10-CM

## 2024-10-22 DIAGNOSIS — Z01.419 WELL WOMAN EXAM: ICD-10-CM

## 2024-10-22 DIAGNOSIS — N39.44 NOCTURNAL ENURESIS: ICD-10-CM

## 2024-10-22 LAB
APPEARANCE UR: ABNORMAL
APPEARANCE UR: CLEAR
BACTERIA #/AREA URNS HPF: ABNORMAL /HPF
BILIRUB UR QL STRIP: NEGATIVE
COLOR UR: ABNORMAL
COLOR UR: YELLOW
GLUCOSE UR STRIP-MCNC: NEGATIVE MG/DL
GLUCOSE UR STRIP-MCNC: NEGATIVE MG/DL
HGB UR QL STRIP: NEGATIVE
HGB UR QL STRIP: NEGATIVE
HYALINE CASTS #/AREA URNS LPF: ABNORMAL /LPF
KETONES UR STRIP-MCNC: NEGATIVE MG/DL
KETONES UR STRIP-MCNC: NEGATIVE MG/DL
LEUKOCYTE ESTERASE UR QL STRIP: ABNORMAL
LEUKOCYTE ESTERASE UR QL STRIP: NEGATIVE
MUCOUS THREADS URNS QL MICRO: PRESENT
NITRITE UR QL STRIP: NEGATIVE
NITRITE UR QL STRIP: NEGATIVE
PH UR STRIP: 6 UNITS (ref 5–7)
PH UR STRIP: 6.5 [PH] (ref 5–7)
PROT UR STRIP-MCNC: 100 MG/DL
PROT UR STRIP-MCNC: ABNORMAL MG/DL
RBC #/AREA URNS HPF: ABNORMAL /HPF
SP GR UR STRIP: 1.01 (ref 1–1.03)
SQUAMOUS #/AREA URNS HPF: ABNORMAL /HPF
UROBILINOGEN UR STRIP-MCNC: 0.2 MG/DL
WBC #/AREA URNS HPF: ABNORMAL /HPF

## 2024-10-22 PROCEDURE — 81003 URINALYSIS AUTO W/O SCOPE: CPT

## 2024-10-22 PROCEDURE — 99214 OFFICE O/P EST MOD 30 MIN: CPT

## 2024-10-22 PROCEDURE — 51701 INSERT BLADDER CATHETER: CPT

## 2024-10-22 PROCEDURE — 87086 URINE CULTURE/COLONY COUNT: CPT | Performed by: OBSTETRICS & GYNECOLOGY

## 2024-10-22 PROCEDURE — 81001 URINALYSIS AUTO W/SCOPE: CPT | Performed by: OBSTETRICS & GYNECOLOGY

## 2024-10-22 PROCEDURE — 10004185 HB COUNTER-VISIT  CENSUS

## 2024-10-22 RX ORDER — ESTRADIOL 0.1 MG/G
CREAM VAGINAL
Qty: 42.5 G | Refills: 11 | Status: SHIPPED | OUTPATIENT
Start: 2024-10-22

## 2024-10-23 DIAGNOSIS — I10 ESSENTIAL HYPERTENSION: ICD-10-CM

## 2024-10-24 ENCOUNTER — TELEPHONE (OUTPATIENT)
Dept: OUTPATIENT SERVICES | Age: 74
End: 2024-10-24

## 2024-10-24 LAB — BACTERIA UR CULT: NORMAL

## 2024-10-26 RX ORDER — AMLODIPINE BESYLATE 5 MG/1
10 TABLET ORAL DAILY
Qty: 180 TABLET | Refills: 0 | Status: SHIPPED | OUTPATIENT
Start: 2024-10-26 | End: 2025-01-24

## 2024-10-28 RX ORDER — LOSARTAN POTASSIUM 25 MG/1
25 TABLET ORAL DAILY
Qty: 90 TABLET | Refills: 3 | Status: SHIPPED | OUTPATIENT
Start: 2024-10-28

## 2024-10-28 NOTE — TELEPHONE ENCOUNTER
Prescription sent to [facility] pharmacy//[facility]  Requested Prescriptions     Signed Prescriptions Disp Refills    losartan (COZAAR) 25 MG tablet 90 tablet 3     Sig: TAKE 1 TABLET BY MOUTH EVERY DAY     Authorizing Provider: KRISTAN CALERO     Ordering User: VICTOR HUGO APODACA

## 2024-11-26 ENCOUNTER — APPOINTMENT (OUTPATIENT)
Dept: SLEEP MEDICINE | Age: 74
End: 2024-11-26
Attending: OBSTETRICS & GYNECOLOGY

## 2024-12-04 ENCOUNTER — TELEPHONE (OUTPATIENT)
Dept: INTERNAL MEDICINE | Age: 74
End: 2024-12-04

## 2024-12-04 RX ORDER — LOSARTAN POTASSIUM 100 MG/1
100 TABLET ORAL DAILY
Qty: 90 TABLET | Refills: 0 | Status: SHIPPED | OUTPATIENT
Start: 2024-12-04

## 2025-01-07 ENCOUNTER — HOSPITAL ENCOUNTER (OUTPATIENT)
Dept: SLEEP MEDICINE | Age: 75
Discharge: HOME | End: 2025-01-07
Attending: OBSTETRICS & GYNECOLOGY

## 2025-01-07 ENCOUNTER — TELEPHONE (OUTPATIENT)
Dept: SLEEP MEDICINE | Age: 75
End: 2025-01-07

## 2025-01-07 DIAGNOSIS — G47.33 OSA (OBSTRUCTIVE SLEEP APNEA): Primary | ICD-10-CM

## 2025-01-07 PROCEDURE — 99211 OFF/OP EST MAY X REQ PHY/QHP: CPT

## 2025-01-09 RX ORDER — APIXABAN 5 MG/1
5 TABLET, FILM COATED ORAL 2 TIMES DAILY
Qty: 180 TABLET | Refills: 3 | Status: SHIPPED | OUTPATIENT
Start: 2025-01-09

## 2025-01-09 RX ORDER — CARVEDILOL 6.25 MG/1
6.25 TABLET ORAL 2 TIMES DAILY WITH MEALS
Qty: 180 TABLET | Refills: 3 | Status: SHIPPED | OUTPATIENT
Start: 2025-01-09

## 2025-01-13 DIAGNOSIS — M43.10 ACQUIRED SPONDYLOLISTHESIS: ICD-10-CM

## 2025-01-13 RX ORDER — DULOXETIN HYDROCHLORIDE 30 MG/1
30 CAPSULE, DELAYED RELEASE ORAL 2 TIMES DAILY
Qty: 60 CAPSULE | Refills: 5 | OUTPATIENT
Start: 2025-01-13

## 2025-01-13 RX ORDER — POTASSIUM CHLORIDE 1500 MG/1
20 TABLET, EXTENDED RELEASE ORAL
Qty: 90 TABLET | Refills: 3 | Status: SHIPPED | OUTPATIENT
Start: 2025-01-13

## 2025-01-23 ENCOUNTER — APPOINTMENT (OUTPATIENT)
Dept: SLEEP MEDICINE | Age: 75
End: 2025-01-23
Attending: INTERNAL MEDICINE

## 2025-01-31 ENCOUNTER — APPOINTMENT (OUTPATIENT)
Dept: SLEEP MEDICINE | Age: 75
End: 2025-01-31
Attending: INTERNAL MEDICINE

## 2025-02-13 DIAGNOSIS — I10 ESSENTIAL HYPERTENSION: ICD-10-CM

## 2025-02-14 RX ORDER — AMLODIPINE BESYLATE 5 MG/1
10 TABLET ORAL DAILY
Qty: 60 TABLET | Refills: 1 | Status: SHIPPED | OUTPATIENT
Start: 2025-02-14 | End: 2025-04-15

## 2025-03-04 RX ORDER — LOSARTAN POTASSIUM 100 MG/1
100 TABLET ORAL DAILY
Qty: 90 TABLET | Refills: 0 | Status: SHIPPED | OUTPATIENT
Start: 2025-03-04

## 2025-03-22 ENCOUNTER — PATIENT MESSAGE (OUTPATIENT)
Age: 75
End: 2025-03-22

## 2025-03-24 RX ORDER — SPIRONOLACTONE 25 MG/1
25 TABLET ORAL DAILY
Qty: 90 TABLET | Refills: 3 | Status: SHIPPED | OUTPATIENT
Start: 2025-03-24

## 2025-03-31 DIAGNOSIS — E78.2 HYPERLIPIDEMIA, MIXED: ICD-10-CM

## 2025-03-31 RX ORDER — ROSUVASTATIN CALCIUM 20 MG/1
20 TABLET, COATED ORAL DAILY
Qty: 90 TABLET | Refills: 3 | Status: SHIPPED | OUTPATIENT
Start: 2025-03-31

## 2025-04-21 ENCOUNTER — OFFICE VISIT (OUTPATIENT)
Age: 75
End: 2025-04-21
Payer: MEDICARE

## 2025-04-21 VITALS
DIASTOLIC BLOOD PRESSURE: 80 MMHG | BODY MASS INDEX: 33.8 KG/M2 | HEART RATE: 62 BPM | SYSTOLIC BLOOD PRESSURE: 134 MMHG | WEIGHT: 198 LBS | HEIGHT: 64 IN

## 2025-04-21 DIAGNOSIS — I50.20 HFREF (HEART FAILURE WITH REDUCED EJECTION FRACTION) (HCC): ICD-10-CM

## 2025-04-21 DIAGNOSIS — E78.00 HYPERCHOLESTEREMIA: Chronic | ICD-10-CM

## 2025-04-21 DIAGNOSIS — R55 SYNCOPE AND COLLAPSE: Chronic | ICD-10-CM

## 2025-04-21 DIAGNOSIS — G89.4 CHRONIC PAIN SYNDROME: Chronic | ICD-10-CM

## 2025-04-21 DIAGNOSIS — I65.23 CAROTID ATHEROSCLEROSIS, BILATERAL: ICD-10-CM

## 2025-04-21 DIAGNOSIS — I48.0 PAROXYSMAL ATRIAL FIBRILLATION (HCC): ICD-10-CM

## 2025-04-21 DIAGNOSIS — I25.10 CAD, MULTIPLE VESSEL: Primary | ICD-10-CM

## 2025-04-21 PROCEDURE — 93000 ELECTROCARDIOGRAM COMPLETE: CPT | Performed by: INTERNAL MEDICINE

## 2025-04-21 PROCEDURE — 1123F ACP DISCUSS/DSCN MKR DOCD: CPT | Performed by: INTERNAL MEDICINE

## 2025-04-21 PROCEDURE — 1126F AMNT PAIN NOTED NONE PRSNT: CPT | Performed by: INTERNAL MEDICINE

## 2025-04-21 PROCEDURE — 99214 OFFICE O/P EST MOD 30 MIN: CPT | Performed by: INTERNAL MEDICINE

## 2025-04-21 ASSESSMENT — ENCOUNTER SYMPTOMS
SHORTNESS OF BREATH: 0
BACK PAIN: 0
ABDOMINAL PAIN: 0
COUGH: 0

## 2025-04-21 NOTE — PROGRESS NOTES
[facility] CARDIOLOGY  [address] [place] [address], [address]  [place] [address]      25      NAME:  Allison Mcdonald  : 1950  MRN: [id number]      SUBJECTIVE:   Allison Mcdonald is a 75 y.o. female seen for a follow up visit regarding the following:     Chief Complaint   Patient presents with    Coronary Artery Disease    Congestive Heart Failure       HPI:   75 y.o. female with history of chronic pain syndrome secondary to extensive cervical and lumbar spine disease.  Patient developed severe substernal chest discomfort and was subsequently diagnosed with multi vessel CAD status post coronary bypass grafting 2023.  She has mild to moderate reduction in LV systolic function with LVEF 40-45%.  She struggled postoperatively with volume retention and A-fib.  She remains in sinus rhythm.  She still struggles with extensive fatigue, weakness and chronic pain.  Patient unfortunately unable to participate in physical therapy or cardiac rehab secondary to chronic pain.  Repeat echo 10/2024 with EF 50-55%.        Past Medical History, Past Surgical History, Family history, Social History, and Medications were all reviewed with the patient today and updated as necessary.     Current Outpatient Medications   Medication Sig Dispense Refill    spironolactone (ALDACTONE) 25 MG tablet Take 1 tablet by mouth daily 90 tablet 3    potassium chloride (KLOR-CON M) 20 MEQ extended release tablet Take 1 tablet by mouth daily (with breakfast) 90 tablet 3    ELIQUIS 5 MG TABS tablet TAKE 1 TABLET BY MOUTH TWICE A  tablet 3    carvedilol (COREG) 6.25 MG tablet Take 1 tablet by mouth 2 times daily (with meals) 180 tablet 3    losartan (COZAAR) 25 MG tablet TAKE 1 TABLET BY MOUTH EVERY DAY 90 tablet 3    furosemide (LASIX) 20 MG tablet Take 1 tablet by mouth as needed (fluid retention) 30 tablet 11    atorvastatin (LIPITOR) 80 MG tablet TAKE 1 TABLET BY MOUTH EVERY DAY AT NIGHT 90 tablet 3    DULoxetine (CYMBALTA) 30 MG

## 2025-05-12 DIAGNOSIS — I10 ESSENTIAL HYPERTENSION: ICD-10-CM

## 2025-05-12 RX ORDER — AMLODIPINE BESYLATE 5 MG/1
10 TABLET ORAL DAILY
Qty: 60 TABLET | Refills: 1 | Status: SHIPPED | OUTPATIENT
Start: 2025-05-12 | End: 2025-07-11

## 2025-05-20 ENCOUNTER — APPOINTMENT (OUTPATIENT)
Dept: INTERNAL MEDICINE | Age: 75
End: 2025-05-20

## 2025-06-11 ENCOUNTER — TELEPHONE (OUTPATIENT)
Dept: INTERNAL MEDICINE | Age: 75
End: 2025-06-11

## 2025-06-11 DIAGNOSIS — R22.41 LOCALIZED SWELLING OF RIGHT FOOT: Primary | ICD-10-CM

## 2025-06-12 ENCOUNTER — TELEPHONE (OUTPATIENT)
Dept: INTERNAL MEDICINE | Age: 75
End: 2025-06-12

## 2025-06-12 DIAGNOSIS — M79.605 LEFT LEG PAIN: Primary | ICD-10-CM

## 2025-06-16 RX ORDER — LOSARTAN POTASSIUM 100 MG/1
100 TABLET ORAL DAILY
Qty: 90 TABLET | Refills: 0 | Status: SHIPPED | OUTPATIENT
Start: 2025-06-16

## 2025-06-17 ENCOUNTER — APPOINTMENT (OUTPATIENT)
Dept: GENERAL RADIOLOGY | Age: 75
End: 2025-06-17
Attending: NURSE PRACTITIONER

## 2025-06-17 ENCOUNTER — HOSPITAL ENCOUNTER (EMERGENCY)
Age: 75
Discharge: HOME | End: 2025-06-17

## 2025-06-17 VITALS
DIASTOLIC BLOOD PRESSURE: 94 MMHG | HEART RATE: 88 BPM | RESPIRATION RATE: 18 BRPM | OXYGEN SATURATION: 98 % | SYSTOLIC BLOOD PRESSURE: 148 MMHG | TEMPERATURE: 97.4 F

## 2025-06-17 DIAGNOSIS — M79.672 LEFT FOOT PAIN: Primary | ICD-10-CM

## 2025-06-17 PROCEDURE — 73630 X-RAY EXAM OF FOOT: CPT

## 2025-06-17 PROCEDURE — 73630 X-RAY EXAM OF FOOT: CPT | Performed by: RADIOLOGY

## 2025-06-17 PROCEDURE — 73610 X-RAY EXAM OF ANKLE: CPT

## 2025-06-17 PROCEDURE — 99284 EMERGENCY DEPT VISIT MOD MDM: CPT | Performed by: NURSE PRACTITIONER

## 2025-06-17 PROCEDURE — 99283 EMERGENCY DEPT VISIT LOW MDM: CPT

## 2025-06-17 PROCEDURE — 10002803 HB RX 637

## 2025-06-17 PROCEDURE — 73610 X-RAY EXAM OF ANKLE: CPT | Performed by: RADIOLOGY

## 2025-06-17 PROCEDURE — L4386 NON-PNEUM WALK BOOT PRE CST: HCPCS | Performed by: NURSE PRACTITIONER

## 2025-06-17 RX ORDER — ACETAMINOPHEN 325 MG/1
650 TABLET ORAL ONCE
Status: COMPLETED | OUTPATIENT
Start: 2025-06-17 | End: 2025-06-17

## 2025-06-17 RX ORDER — IBUPROFEN 600 MG/1
600 TABLET, FILM COATED ORAL ONCE
Status: DISCONTINUED | OUTPATIENT
Start: 2025-06-17 | End: 2025-06-17

## 2025-06-17 RX ADMIN — ACETAMINOPHEN 650 MG: 325 TABLET ORAL at 13:43

## 2025-06-17 ASSESSMENT — PAIN DESCRIPTION - PAIN TYPE: TYPE: ACUTE PAIN

## 2025-06-17 ASSESSMENT — PAIN SCALES - GENERAL: PAINLEVEL_OUTOF10: 7

## 2025-06-26 DIAGNOSIS — I10 ESSENTIAL HYPERTENSION: ICD-10-CM

## 2025-07-01 RX ORDER — HYDROCHLOROTHIAZIDE 12.5 MG/1
12.5 TABLET ORAL DAILY
Qty: 90 TABLET | Refills: 1 | Status: SHIPPED | OUTPATIENT
Start: 2025-07-01

## 2025-07-09 DIAGNOSIS — I10 ESSENTIAL HYPERTENSION: ICD-10-CM

## 2025-07-11 RX ORDER — AMLODIPINE BESYLATE 5 MG/1
10 TABLET ORAL DAILY
Qty: 60 TABLET | Refills: 1 | Status: SHIPPED | OUTPATIENT
Start: 2025-07-11

## 2025-07-23 ENCOUNTER — LAB SERVICES (OUTPATIENT)
Dept: LAB | Age: 75
End: 2025-07-23
Attending: STUDENT IN AN ORGANIZED HEALTH CARE EDUCATION/TRAINING PROGRAM

## 2025-07-23 ENCOUNTER — OFFICE VISIT (OUTPATIENT)
Dept: INTERNAL MEDICINE | Age: 75
End: 2025-07-23
Attending: STUDENT IN AN ORGANIZED HEALTH CARE EDUCATION/TRAINING PROGRAM

## 2025-07-23 VITALS
WEIGHT: 214 LBS | BODY MASS INDEX: 36.54 KG/M2 | SYSTOLIC BLOOD PRESSURE: 176 MMHG | HEIGHT: 64 IN | DIASTOLIC BLOOD PRESSURE: 83 MMHG | HEART RATE: 82 BPM | TEMPERATURE: 97.2 F

## 2025-07-23 DIAGNOSIS — I10 ESSENTIAL HYPERTENSION: ICD-10-CM

## 2025-07-23 DIAGNOSIS — Z00.00 MEDICARE ANNUAL WELLNESS VISIT, SUBSEQUENT: Primary | ICD-10-CM

## 2025-07-23 DIAGNOSIS — N18.2 CKD (CHRONIC KIDNEY DISEASE) STAGE 2, GFR 60-89 ML/MIN: ICD-10-CM

## 2025-07-23 DIAGNOSIS — S82.892S CLOSED FRACTURE OF LEFT ANKLE, SEQUELA: ICD-10-CM

## 2025-07-23 LAB
ANION GAP SERPL CALC-SCNC: 10 MMOL/L (ref 7–19)
BUN SERPL-MCNC: 16 MG/DL (ref 6–20)
BUN/CREAT SERPL: 17 (ref 7–25)
CALCIUM SERPL-MCNC: 9.6 MG/DL (ref 8.4–10.2)
CHLORIDE SERPL-SCNC: 109 MMOL/L (ref 97–110)
CO2 SERPL-SCNC: 25 MMOL/L (ref 21–32)
CREAT SERPL-MCNC: 0.95 MG/DL (ref 0.51–0.95)
DEPRECATED RDW RBC: 43 FL (ref 39–50)
EGFRCR SERPLBLD CKD-EPI 2021: 62 ML/MIN/{1.73_M2}
ERYTHROCYTE [DISTWIDTH] IN BLOOD: 13.3 % (ref 11–15)
FASTING DURATION TIME PATIENT: NORMAL H
GLUCOSE SERPL-MCNC: 94 MG/DL (ref 70–99)
HBA1C MFR BLD: 5.1 % (ref 4.5–5.6)
HCT VFR BLD CALC: 35.8 % (ref 36–46.5)
HGB BLD-MCNC: 11.6 G/DL (ref 12–15.5)
MCH RBC QN AUTO: 28.4 PG (ref 26–34)
MCHC RBC AUTO-ENTMCNC: 32.4 G/DL (ref 32–36.5)
MCV RBC AUTO: 87.7 FL (ref 78–100)
NRBC BLD MANUAL-RTO: 0 /100 WBC
PLATELET # BLD AUTO: 314 K/MCL (ref 140–450)
POTASSIUM SERPL-SCNC: 3.7 MMOL/L (ref 3.4–5.1)
RBC # BLD: 4.08 MIL/MCL (ref 4–5.2)
SODIUM SERPL-SCNC: 140 MMOL/L (ref 135–145)
WBC # BLD: 9.3 K/MCL (ref 4.2–11)

## 2025-07-23 PROCEDURE — 83036 HEMOGLOBIN GLYCOSYLATED A1C: CPT

## 2025-07-23 PROCEDURE — 80048 BASIC METABOLIC PNL TOTAL CA: CPT

## 2025-07-23 PROCEDURE — 85027 COMPLETE CBC AUTOMATED: CPT

## 2025-07-23 RX ORDER — OLMESARTAN MEDOXOMIL 20 MG/1
20 TABLET ORAL DAILY
Qty: 30 TABLET | Refills: 1 | Status: SHIPPED | OUTPATIENT
Start: 2025-07-23

## 2025-07-24 ENCOUNTER — TELEPHONE (OUTPATIENT)
Dept: FAMILY MEDICINE | Age: 75
End: 2025-07-24

## 2025-07-24 RX ORDER — PREGABALIN 25 MG/1
25 CAPSULE ORAL 2 TIMES DAILY
Qty: 60 CAPSULE | Refills: 1 | Status: SHIPPED | OUTPATIENT
Start: 2025-07-24

## 2025-07-25 ASSESSMENT — PATIENT HEALTH QUESTIONNAIRE - PHQ9
2. FEELING DOWN, DEPRESSED OR HOPELESS: NEARLY EVERY DAY
5. POOR APPETITE, WEIGHT LOSS, OR OVEREATING: NEARLY EVERY DAY
10. IF YOU CHECKED OFF ANY PROBLEMS, HOW DIFFICULT HAVE THESE PROBLEMS MADE IT FOR YOU TO DO YOUR WORK, TAKE CARE OF THINGS AT HOME, OR GET ALONG WITH OTHER PEOPLE: EXTREMELY DIFFICULT
SUM OF ALL RESPONSES TO PHQ9 QUESTIONS 1 AND 2: 4
CLINICAL INTERPRETATION OF PHQ9 SCORE: MODERATE DEPRESSION
CLINICAL INTERPRETATION OF PHQ2 SCORE: FURTHER SCREENING NEEDED
8. MOVING OR SPEAKING SO SLOWLY THAT OTHER PEOPLE COULD HAVE NOTICED. OR THE OPPOSITE, BEING SO FIGETY OR RESTLESS THAT YOU HAVE BEEN MOVING AROUND A LOT MORE THAN USUAL: NEARLY EVERY DAY
1. LITTLE INTEREST OR PLEASURE IN DOING THINGS: SEVERAL DAYS
SUM OF ALL RESPONSES TO PHQ9 QUESTIONS 1 AND 2: 4
9. THOUGHTS THAT YOU WOULD BE BETTER OFF DEAD, OR OF HURTING YOURSELF: NOT AT ALL
6. FEELING BAD ABOUT YOURSELF - OR THAT YOU ARE A FAILURE OR HAVE LET YOURSELF OR YOUR FAMILY DOWN: NOT AT ALL
4. FEELING TIRED OR HAVING LITTLE ENERGY: NEARLY EVERY DAY
SUM OF ALL RESPONSES TO PHQ QUESTIONS 1-9: 13
3. TROUBLE FALLING OR STAYING ASLEEP OR SLEEPING TOO MUCH: NOT AT ALL
7. TROUBLE CONCENTRATING ON THINGS, SUCH AS READING THE NEWSPAPER OR WATCHING TELEVISION: NOT AT ALL

## 2025-07-29 ENCOUNTER — MED INFO FORMS (OUTPATIENT)
Dept: INTERNAL MEDICINE | Age: 75
End: 2025-07-29

## 2025-08-01 ENCOUNTER — HOSPITAL ENCOUNTER (OUTPATIENT)
Dept: REHABILITATION | Age: 75
Discharge: HOME | End: 2025-08-01
Attending: STUDENT IN AN ORGANIZED HEALTH CARE EDUCATION/TRAINING PROGRAM

## 2025-08-01 ENCOUNTER — CLINICAL ABSTRACT (OUTPATIENT)
Dept: REHABILITATION | Age: 75
End: 2025-08-01

## 2025-08-01 DIAGNOSIS — R53.1 WEAKNESS: Primary | ICD-10-CM

## 2025-08-05 RX ORDER — ATORVASTATIN CALCIUM 80 MG/1
80 TABLET, FILM COATED ORAL NIGHTLY
Qty: 90 TABLET | Refills: 3 | Status: SHIPPED | OUTPATIENT
Start: 2025-08-05

## 2025-08-13 ENCOUNTER — TELEPHONE (OUTPATIENT)
Dept: INTERNAL MEDICINE | Age: 75
End: 2025-08-13

## 2025-08-13 DIAGNOSIS — K22.10 EROSIVE ESOPHAGITIS: ICD-10-CM

## 2025-08-13 DIAGNOSIS — K22.2 GERD WITH STRICTURE: ICD-10-CM

## 2025-08-13 DIAGNOSIS — K21.9 GERD WITH STRICTURE: ICD-10-CM

## 2025-08-13 RX ORDER — OLMESARTAN MEDOXOMIL 20 MG/1
20 TABLET ORAL DAILY
Qty: 30 TABLET | Refills: 1 | Status: CANCELLED | OUTPATIENT
Start: 2025-08-13

## 2025-08-13 RX ORDER — PREGABALIN 25 MG/1
25 CAPSULE ORAL 2 TIMES DAILY
Qty: 60 CAPSULE | Refills: 1 | Status: CANCELLED | OUTPATIENT
Start: 2025-08-13

## 2025-08-14 RX ORDER — PANTOPRAZOLE SODIUM 40 MG/1
40 TABLET, DELAYED RELEASE ORAL DAILY
Qty: 90 TABLET | Refills: 0 | Status: SHIPPED | OUTPATIENT
Start: 2025-08-14

## (undated) DEVICE — CABG DR WILLIAMS: Brand: MEDLINE INDUSTRIES, INC.

## (undated) DEVICE — INTENDED FOR TISSUE SEPARATION, AND OTHER PROCEDURES THAT REQUIRE A SHARP SURGICAL BLADE TO PUNCTURE OR CUT.: Brand: BARD-PARKER SAFETY BLADES SIZE 10, STERILE

## (undated) DEVICE — 3M™ STERI-DRAPE™ U-DRAPE 1015: Brand: STERI-DRAPE™

## (undated) DEVICE — FORCEPS BIOPSY NDL 240CM 2.2MM RJ 4 2.8MM STD CAPACITY STRL

## (undated) DEVICE — SUTURE NONABSORBABLE L24IN SZ 7-0 M0-5 BV175-8 EP 24 BLU M8745

## (undated) DEVICE — MPS® DELIVERY SET WITH 6 FT. DELIVERY TUBING: Brand: MPS

## (undated) DEVICE — PRESSURE MONITORING SET: Brand: TRUWAVE, VAMP PLUS

## (undated) DEVICE — PREVENA PEEL & PLACE SYSTEM KIT- 13 CM: Brand: PREVENA™ PEEL & PLACE™

## (undated) DEVICE — DRAPE,TOP,102X53,STERILE: Brand: MEDLINE

## (undated) DEVICE — MMIS - KIT PROC DISP VLV CNCT ENDO CARRY-ON DEFENDO

## (undated) DEVICE — MMIS - DILATOR ENDO 15-16.5-18MM 7.5FR 2.8MM 3.2MM 240CM

## (undated) DEVICE — PREP SKN CHLRAPRP APL 26ML STR --

## (undated) DEVICE — TTL1LYR 16FR10ML 100%SIL TMPST TR: Brand: MEDLINE

## (undated) DEVICE — GUIDEWIRE SURG L240MM DIA2MM S STL TRCR TIP FOR HUM NAIL
Type: IMPLANTABLE DEVICE | Site: HUMERUS | Status: NON-FUNCTIONAL
Removed: 2020-10-14

## (undated) DEVICE — SUTURE VCRL SZ 0 L36IN ABSRB VLT L36MM CT-1 1/2 CIR J346H

## (undated) DEVICE — SOLUTION IRRIG 3000ML 0.9% SOD CHL USP UROMATIC PLAS CONT

## (undated) DEVICE — CATHETER THOR 24FR L22IN PVC 5 EYELET STR ATRAUM

## (undated) DEVICE — CATHETER THOR 32FR L23IN PVC 6 EYELET STR ATRAUM

## (undated) DEVICE — CANISTER, RIGID, 3000CC: Brand: MEDLINE INDUSTRIES, INC.

## (undated) DEVICE — AUTOTRANSFUSION KIT 120 MH FAST STRT AT1 FOR CATS +

## (undated) DEVICE — BIT DRL L330MM DIA3.2MM CALIB L100MM NONSTERILE 3 FLUT QUIK

## (undated) DEVICE — MMIS - FORCEPS BIOPSY NDL 240CM 2.2MM RJ 4 2.8MM STD

## (undated) DEVICE — KIT ENDO CMPLN ENDOKIT DISP COLON

## (undated) DEVICE — PERCUTANEOUS INSERTION KIT-ARTERIAL: Brand: PERCUTANEOUS INSERTION KIT-ARTERIAL

## (undated) DEVICE — LOWER EXTREMITY: Brand: MEDLINE INDUSTRIES, INC.

## (undated) DEVICE — MMIS - DILATOR ENDO 15-16.5-18MM 6FR 2.8MM 180CM CRE FX

## (undated) DEVICE — SYSTEM ENDOSCP VES HARV W/ TOOL CANN SEAL SHT PRT BLNT TIP

## (undated) DEVICE — REM POLYHESIVE ADULT PATIENT RETURN ELECTRODE: Brand: VALLEYLAB

## (undated) DEVICE — CLAMP INSERT: Brand: STEALTH® CLAMP INSERT

## (undated) DEVICE — SOLUTION IRRIG 1000ML 09% SOD CHL USP PIC PLAS CONTAINER

## (undated) DEVICE — ADHESIVE SKIN CLSR 0.7ML TOP DERMBND ADV

## (undated) DEVICE — DRAPE SHT 3 QTR PROXIMA 53X77 --

## (undated) DEVICE — SUTURE STRATAFIX SPRL SZ 3-0 L9IN ABSRB VLT FS L26MM 3/8 SXPD2B419

## (undated) DEVICE — TUBING SCT CLR 20FT .25IN MDVC MAXI-GRIP RGD MALE TO MALE

## (undated) DEVICE — SLIM BODY SKIN STAPLER: Brand: APPOSE ULC

## (undated) DEVICE — K WIRE FIX L285MM DIA2.5MM S STL W/ TRCR PNT
Type: IMPLANTABLE DEVICE | Site: HUMERUS | Status: NON-FUNCTIONAL
Removed: 2020-10-14

## (undated) DEVICE — SUTURE PROL SZ 6-0 L30IN NONABSORBABLE BLU L13MM CC-1 3/8 M8707

## (undated) DEVICE — AMD ANTIMICROBIAL GAUZE SPONGES,12 PLY USP TYPE VII, 0.2% POLYHEXAMETHYLENE BIGUANIDE HCI (PHMB): Brand: CURITY

## (undated) DEVICE — MMIS - KIT ENDO COMPLIANCE

## (undated) DEVICE — SOLUTION IV 1000ML 0.9% SOD CHL

## (undated) DEVICE — DRAPE XR C ARM 41X74IN LF --

## (undated) DEVICE — PERFUSION PACK XCOATING 76320] TERUMO CARDIOVASCULAR]

## (undated) DEVICE — CATHETERIZATION KIT 9 FRX115 CM CV DL ARROWG+ARD +

## (undated) DEVICE — KIT ESCP 5 PC DEFENDO OLYMPUS GI ESCP

## (undated) DEVICE — DILATOR ENDO 15-16.5-18MM 6FR 2.8MM 180CM CRE FX WIRE BLN

## (undated) DEVICE — SHEET, DRAPE, SPLIT, STERILE: Brand: MEDLINE

## (undated) DEVICE — CARDINAL HEALTH FLEXIBLE LIGHT HANDLE COVER: Brand: CARDINAL HEALTH

## (undated) DEVICE — INTENDED TO BE USED TO OCCLUDE, RETRACT AND IDENTIFY ARTERIES, VEINS, TENDONS AND NERVES IN SURGICAL PROCEDURES: Brand: STERION®  VESSEL LOOP

## (undated) DEVICE — DRAPE,U/SHT,SPLIT,FILM,60X84,STERILE: Brand: MEDLINE

## (undated) DEVICE — FLOTRAC SENSOR WITH VAMP SYSTEM 60" / 152CM: Brand: FLOTRAC, VAMP

## (undated) DEVICE — MMIS - BLOCK BITE 60FR BLOX DISP

## (undated) DEVICE — DEVICE BIG60 60ML 3W STPCK XTN TUBE MALE ROTATE LL ANLG DSPL

## (undated) DEVICE — BLOCK BITE 60FR BLOX DISP

## (undated) DEVICE — MMIS - SYRINGE 60CC 200 PSI 14 ATM 3W STPCK DILATION GRAD

## (undated) DEVICE — MMIS - DEVICE BIG60 60ML 3W STPCK XTN TUBE MALE ROTATE LL

## (undated) DEVICE — DRAIN SURG SGL COLL PT TB FOR ATS BG OASIS

## (undated) DEVICE — 7 DAY SILVER-COATED ANTIMICROBIAL BARRIER DRESSING: Brand: ACTICOAT 7  4" X 5"